# Patient Record
Sex: MALE | Race: WHITE | NOT HISPANIC OR LATINO | Employment: OTHER | URBAN - METROPOLITAN AREA
[De-identification: names, ages, dates, MRNs, and addresses within clinical notes are randomized per-mention and may not be internally consistent; named-entity substitution may affect disease eponyms.]

---

## 2017-08-15 RX ORDER — FINASTERIDE 5 MG/1
TABLET, FILM COATED ORAL
Qty: 90 TABLET | Refills: 0 | Status: SHIPPED | OUTPATIENT
Start: 2017-08-15 | End: 2017-10-19 | Stop reason: SDUPTHER

## 2017-08-15 RX ORDER — GLIPIZIDE 10 MG/1
TABLET ORAL
Qty: 180 TABLET | Refills: 0 | Status: SHIPPED | OUTPATIENT
Start: 2017-08-15 | End: 2017-10-19 | Stop reason: SDUPTHER

## 2017-08-15 RX ORDER — METFORMIN HYDROCHLORIDE 1000 MG/1
TABLET ORAL
Qty: 180 TABLET | Refills: 0 | Status: SHIPPED | OUTPATIENT
Start: 2017-08-15 | End: 2017-10-19 | Stop reason: SDUPTHER

## 2017-08-15 RX ORDER — LISINOPRIL 10 MG/1
TABLET ORAL
Qty: 90 TABLET | Refills: 0 | Status: SHIPPED | OUTPATIENT
Start: 2017-08-15 | End: 2017-10-19 | Stop reason: SDUPTHER

## 2017-10-19 ENCOUNTER — OFFICE VISIT (OUTPATIENT)
Dept: FAMILY MEDICINE | Facility: CLINIC | Age: 72
End: 2017-10-19

## 2017-10-19 VITALS
HEART RATE: 108 BPM | OXYGEN SATURATION: 98 % | HEIGHT: 69 IN | SYSTOLIC BLOOD PRESSURE: 110 MMHG | WEIGHT: 215 LBS | BODY MASS INDEX: 31.84 KG/M2 | DIASTOLIC BLOOD PRESSURE: 60 MMHG

## 2017-10-19 VITALS — WEIGHT: 217 LBS | HEIGHT: 69 IN | BODY MASS INDEX: 32.14 KG/M2

## 2017-10-19 DIAGNOSIS — B02.9 HERPES ZOSTER WITHOUT COMPLICATION: ICD-10-CM

## 2017-10-19 DIAGNOSIS — E11.9 TYPE 2 DIABETES MELLITUS WITHOUT COMPLICATION, WITHOUT LONG-TERM CURRENT USE OF INSULIN: Primary | ICD-10-CM

## 2017-10-19 DIAGNOSIS — I49.9 IRREGULAR HEARTBEAT: ICD-10-CM

## 2017-10-19 DIAGNOSIS — R35.1 EXCESSIVE URINATION AT NIGHT: ICD-10-CM

## 2017-10-19 PROBLEM — H54.40 BLIND LEFT EYE: Status: ACTIVE | Noted: 2017-10-19

## 2017-10-19 PROBLEM — Z78.9 NON-SMOKER: Status: ACTIVE | Noted: 2017-10-19

## 2017-10-19 PROBLEM — J30.1 HAY FEVER: Status: ACTIVE | Noted: 2017-10-19

## 2017-10-19 PROCEDURE — 99212 OFFICE O/P EST SF 10 MIN: CPT | Mod: ,,, | Performed by: NURSE PRACTITIONER

## 2017-10-19 RX ORDER — PLANT STANOL ESTER 450 MG
1 TABLET ORAL DAILY
COMMUNITY
End: 2017-10-19

## 2017-10-19 RX ORDER — PROMETHAZINE HYDROCHLORIDE 12.5 MG/1
1 TABLET ORAL EVERY 6 HOURS PRN
COMMUNITY
End: 2017-10-19

## 2017-10-19 RX ORDER — NIACIN 500 MG
1 CAPSULE, EXTENDED RELEASE ORAL DAILY
COMMUNITY
End: 2017-10-19

## 2017-10-19 RX ORDER — ASPIRIN 81 MG/1
1 TABLET ORAL 2 TIMES DAILY
COMMUNITY
End: 2019-07-02 | Stop reason: SDUPTHER

## 2017-10-19 RX ORDER — LISINOPRIL 10 MG/1
10 TABLET ORAL DAILY
Qty: 90 TABLET | Refills: 1 | Status: SHIPPED | OUTPATIENT
Start: 2017-10-19 | End: 2018-04-17 | Stop reason: SDUPTHER

## 2017-10-19 RX ORDER — GABAPENTIN 300 MG/1
300 CAPSULE ORAL 2 TIMES DAILY
COMMUNITY
End: 2018-06-28

## 2017-10-19 RX ORDER — GLIPIZIDE 10 MG/1
10 TABLET ORAL 2 TIMES DAILY
Qty: 180 TABLET | Refills: 1 | Status: SHIPPED | OUTPATIENT
Start: 2017-10-19 | End: 2018-04-17 | Stop reason: SDUPTHER

## 2017-10-19 RX ORDER — METFORMIN HYDROCHLORIDE 1000 MG/1
1000 TABLET ORAL 2 TIMES DAILY
Qty: 180 TABLET | Refills: 1 | Status: SHIPPED | OUTPATIENT
Start: 2017-10-19 | End: 2018-04-17 | Stop reason: SDUPTHER

## 2017-10-19 RX ORDER — FINASTERIDE 5 MG/1
5 TABLET, FILM COATED ORAL 2 TIMES DAILY
Qty: 180 TABLET | Refills: 1 | Status: SHIPPED | OUTPATIENT
Start: 2017-10-19 | End: 2018-04-17 | Stop reason: SDUPTHER

## 2017-10-19 RX ORDER — VALACYCLOVIR HYDROCHLORIDE 1 G/1
1000 TABLET, FILM COATED ORAL 3 TIMES DAILY
COMMUNITY
End: 2018-06-28

## 2017-10-19 RX ORDER — METOPROLOL TARTRATE 25 MG/1
25 TABLET, FILM COATED ORAL 2 TIMES DAILY
Qty: 180 TABLET | Refills: 1 | Status: SHIPPED | OUTPATIENT
Start: 2017-10-19 | End: 2018-04-17 | Stop reason: SDUPTHER

## 2017-10-19 NOTE — PATIENT INSTRUCTIONS
Understanding Atrial Fibrillation    An arrhythmia is any problem with the speed or pattern of the heartbeat. Atrial fibrillation (AFib) is a common type of arrhythmia. It causes fast, chaotic electrical signals in the atria. This leads to poor functioning of the heart. It also affects how much blood your heart can pump out to the body.  Afib may occur once in a while and go away on its own. Or it may continue for longer periods and need treatment.  AFib can lead to serious problems, such as stroke. Your healthcare provider will need to monitor and manage it.  What happens during atrial fibrillation?   The heart has an electrical system that sends signals to control the heartbeat. As signals move through the heart, they tell the hearts upper chambers (atria) and lower chambers (ventricles) when to squeeze (contract) and relax. This lets blood move through the heart and out to the body and lungs.  With AFib, the atria receive abnormal signals. This causes them to contract in a fast and irregular way, and out of sync with the ventricles. When this happens, the atria also have a harder time moving blood into the ventricles. Blood may then pool in the atria, which increases the risk for blood clots and stroke. The ventricles also may contract too quickly and irregularly. As a result, they may not pump blood to the body and lungs as well as they should. This can weaken the heart muscle over time and cause heart failure.  What causes atrial fibrillation?  AFib is more common in older adults. It has many possible causes including:  · Coronary artery disease  · Heart valve disease  · Heart attack  · Heart surgery  · High blood pressure  · Thyroid disease  · Diabetes  · Lung disease  · Sleep apnea  · Heavy alcohol use  In some cases of AFib, doctors do not know the cause.  What are the symptoms of atrial fibrillation?  AFib may or may not cause symptoms. If symptoms do occur, they may include:  · A fast, pounding,  irregular heartbeat  · Shortness of breath  · Tiredness  · Dizziness or fainting  · Chest pain  How is atrial fibrillation treated?  Treatments for AFib can include any of the options below.  · Medicines. You may be prescribed:  ¨ Heart rate medicines to help slow down the heartbeat  ¨ Heart rhythm medicines to help the heart beat more regularly  ¨ Anti-clotting medicines to help reduce the risk for blood clots and stroke  · Electrical cardioversion. Your healthcare provider uses special pads or paddles to send one or more brief electrical shocks to the heart. This can help reset the heartbeat to normal.  · Ablation. Long, thin tubes called catheters are threaded through a blood vessel to the heart. There, the catheters send out hot or cold energy to the areas causing the abnormal signals. This energy destroys the problem tissue or cells. This improves the chances that your heart will stay in normal rhythm without using medicines. If your heart rate and rhythm cant be controlled, you may need ablation and a pacemaker. These will help control the heart rate and regularity of the heartbeat.  · Surgery. During surgery, your healthcare provider may use different methods to create scar tissue in the areas of the heart causing the abnormal signals. The scar tissue disrupts the abnormal signals and may stop AFib from occurring.  What are the complications of atrial fibrillation?  These can include:  · Blood clots  · Stroke  · Heart failure. This problem occurs when the heart muscle weakens so much that it can no longer pump blood well.  When should I call my healthcare provider?  Call your healthcare provider right away if you have any of these:  · Symptoms that dont get better with treatment, or get worse  · New symptoms   Date Last Reviewed: 5/1/2016  © 2554-7350 [employer]. [address] [place] [address], [place] [address]. All rights reserved. This information is not intended as a substitute for professional  medical care. Always follow your healthcare professional's instructions.

## 2017-10-19 NOTE — PROGRESS NOTES
"Subjective:       Patient ID: Theo Chen Jr. is a 72 y.o. male.    Chief Complaint: Rash (forehead and scalp); Diabetes (rx refill); and Benign Prostatic Hypertrophy    Mr. Chen is here for medication refill.  He reports that he was having more problems with urination, but increased his "prostate medicine" to one at noon and one at bedtime and is doing much better.  He denies other new complaints.  He does have c/o continued fatigue.      Rash   This is a new (went to urgent care and was diagnosed with shingles) problem. The current episode started 1 to 4 weeks ago. The problem has been rapidly improving since onset. The affected locations include the scalp and face (forehead). The rash is characterized by pain, redness, swelling and blistering. Associated symptoms include fatigue. Pertinent negatives include no anorexia, congestion, cough, diarrhea, eye pain, facial edema, fever, joint pain, nail changes, rhinorrhea, shortness of breath, sore throat or vomiting. Treatments tried: on Valtrex and gabapentin. The treatment provided significant relief.   Diabetes   He presents for his follow-up diabetic visit. He has type 2 diabetes mellitus. His disease course has been stable. There are no hypoglycemic associated symptoms. Pertinent negatives for hypoglycemia include no confusion, dizziness, headaches or pallor. Associated symptoms include fatigue and weakness. Pertinent negatives for diabetes include no blurred vision, no chest pain, no foot paresthesias, no foot ulcerations, no polydipsia, no polyphagia, no polyuria, no visual change and no weight loss. There are no hypoglycemic complications. Symptoms are stable. Risk factors for coronary artery disease include diabetes mellitus, dyslipidemia, male sex, obesity and sedentary lifestyle. Current diabetic treatment includes oral agent (dual therapy). He is compliant with treatment most of the time. He is following a generally unhealthy diet. When asked about " meal planning, he reported none. He has not had a previous visit with a dietitian. He rarely participates in exercise. There is no change in his home blood glucose trend. An ACE inhibitor/angiotensin II receptor blocker is being taken. He does not see a podiatrist.Eye exam is not current.   Benign Prostatic Hypertrophy   This is a chronic problem. The current episode started more than 1 year ago. The problem has been gradually worsening since onset. Irritative symptoms include frequency and nocturia. Irritative symptoms do not include urgency. Obstructive symptoms include dribbling, incomplete emptying, an intermittent stream, a slower stream and a weak stream. Obstructive symptoms do not include straining. Associated symptoms include hesitancy. Pertinent negatives include no chills, dysuria, genital pain, hematuria, nausea or vomiting. He is not sexually active. Past treatments include finasteride. The treatment provided moderate relief. He has been using treatment for 2 or more years.       Review of Systems   Constitutional: Positive for fatigue. Negative for activity change, appetite change, chills, fever and weight loss.   HENT: Negative for congestion, rhinorrhea and sore throat.    Eyes: Negative for blurred vision, pain and visual disturbance.   Respiratory: Negative for cough and shortness of breath.    Cardiovascular: Negative for chest pain and palpitations.   Gastrointestinal: Negative for abdominal pain, anorexia, constipation, diarrhea, nausea and vomiting.   Endocrine: Negative for polydipsia, polyphagia and polyuria.   Genitourinary: Positive for frequency, hesitancy, incomplete emptying and nocturia. Negative for dysuria, hematuria and urgency.   Musculoskeletal: Negative for arthralgias, gait problem, joint pain and myalgias.   Skin: Positive for rash. Negative for color change, nail changes and pallor.   Allergic/Immunologic: Negative for immunocompromised state.   Neurological: Positive for  "weakness. Negative for dizziness, syncope, numbness and headaches.   Hematological: Negative for adenopathy.   Psychiatric/Behavioral: Negative for confusion, self-injury and suicidal ideas.        Past Surgical History:   Procedure Laterality Date    APPENDECTOMY      EYE SURGERY Left        Family History   Problem Relation Age of Onset    Hypertension Mother     Heart disease Father         Social History     Social History    Marital status:      Spouse name: N/A    Number of children: N/A    Years of education: N/A     Occupational History    retired       Social History Main Topics    Smoking status: Former Smoker    Smokeless tobacco: Never Used    Alcohol use No    Drug use: No    Sexual activity: Not Asked     Other Topics Concern    None     Social History Narrative    None       Current Outpatient Prescriptions   Medication Sig Dispense Refill    aspirin (ECOTRIN) 81 MG EC tablet Take 1 tablet by mouth once daily.      finasteride (PROSCAR) 5 mg tablet Take 1 tablet (5 mg total) by mouth 2 (two) times daily. 180 tablet 1    gabapentin (NEURONTIN) 300 MG capsule Take 300 mg by mouth 2 (two) times daily.      glipiZIDE (GLUCOTROL) 10 MG tablet Take 1 tablet (10 mg total) by mouth 2 (two) times daily. 180 tablet 1    lisinopril 10 MG tablet Take 1 tablet (10 mg total) by mouth once daily. 90 tablet 1    metformin (GLUCOPHAGE) 1000 MG tablet Take 1 tablet (1,000 mg total) by mouth 2 (two) times daily. 180 tablet 1    valacyclovir (VALTREX) 1000 MG tablet Take 1,000 mg by mouth 3 (three) times daily.       No current facility-administered medications for this visit.        Review of patient's allergies indicates:  No Known Allergies   Objective:   Blood pressure 110/60, pulse 108, height 5' 9" (1.753 m), weight 97.5 kg (215 lb), SpO2 98 %. Body mass index is 31.75 kg/m².       Physical Exam   Constitutional: He is oriented to person, place, and time. He appears well-developed and " well-nourished. No distress.   HENT:   Head: Normocephalic and atraumatic.   Right Ear: External ear normal.   Left Ear: External ear normal.   Nose: Nose normal.   Mouth/Throat: Oropharynx is clear and moist.   Eyes: Conjunctivae and lids are normal. Pupils are equal, round, and reactive to light. No scleral icterus.   Neck: Normal range of motion. Neck supple. Carotid bruit is not present. No thyromegaly present.   Cardiovascular: Normal rate and normal pulses.  An irregularly irregular rhythm present. Exam reveals no gallop and no friction rub.    No murmur heard.  Pulses:       Dorsalis pedis pulses are 2+ on the right side, and 2+ on the left side.        Posterior tibial pulses are 2+ on the right side, and 2+ on the left side.   Pulmonary/Chest: Effort normal and breath sounds normal.   Abdominal: Soft. Bowel sounds are normal. There is no tenderness.   Musculoskeletal: Normal range of motion.        Right foot: There is no deformity.        Left foot: There is no deformity.   Feet:   Right Foot:   Protective Sensation: 10 sites tested. 10 sites sensed.   Skin Integrity: Negative for ulcer, skin breakdown or warmth.   Left Foot:   Protective Sensation: 10 sites tested. 10 sites sensed.   Skin Integrity: Negative for ulcer, skin breakdown or warmth.   Lymphadenopathy:     He has no cervical adenopathy.   Neurological: He is alert and oriented to person, place, and time.   Skin: Skin is warm, dry and intact. Rash noted. Rash is vesicular (with crusting, healing). He is not diaphoretic.   Psychiatric: He has a normal mood and affect. His behavior is normal. Judgment and thought content normal. He expresses no suicidal plans.   Vitals reviewed.       Assessment:       1. Type 2 diabetes mellitus without complication, without long-term current use of insulin    2. Excessive urination at night    3. Irregular heartbeat    4. Herpes zoster without complication        Plan:       Theo was seen today for rash,  diabetes and benign prostatic hypertrophy.    Diagnoses and all orders for this visit:    Type 2 diabetes mellitus without complication, without long-term current use of insulin  -     lisinopril 10 MG tablet; Take 1 tablet (10 mg total) by mouth once daily.  -     glipiZIDE (GLUCOTROL) 10 MG tablet; Take 1 tablet (10 mg total) by mouth 2 (two) times daily.  -     metformin (GLUCOPHAGE) 1000 MG tablet; Take 1 tablet (1,000 mg total) by mouth 2 (two) times daily.  -     Comprehensive metabolic panel; Future  -     Hemoglobin A1c; Future    Excessive urination at night  -     finasteride (PROSCAR) 5 mg tablet; Take 1 tablet (5 mg total) by mouth 2 (two) times daily.    Irregular heartbeat   Likely a-fib; pt refused EKG in office; recommend he go to ER for eval, refused; increase aspirin to 325 mg daily;  Metoprolol 25 mg bid for rate control; side effects of untreated a-fib discussed with patient; counseled on sign/symptoms of a stroke; understanding voiced    Herpes zoster without complication   Improving; finish Valtrex

## 2018-04-17 DIAGNOSIS — R00.9 HEART RATE PROBLEM: Primary | ICD-10-CM

## 2018-04-17 DIAGNOSIS — E11.9 TYPE 2 DIABETES MELLITUS WITHOUT COMPLICATION, WITHOUT LONG-TERM CURRENT USE OF INSULIN: ICD-10-CM

## 2018-04-17 DIAGNOSIS — R35.1 EXCESSIVE URINATION AT NIGHT: ICD-10-CM

## 2018-04-17 RX ORDER — FINASTERIDE 5 MG/1
5 TABLET, FILM COATED ORAL 2 TIMES DAILY
Qty: 180 TABLET | Refills: 1 | Status: SHIPPED | OUTPATIENT
Start: 2018-04-17 | End: 2018-06-28 | Stop reason: SDUPTHER

## 2018-04-17 RX ORDER — LISINOPRIL 10 MG/1
10 TABLET ORAL DAILY
Qty: 90 TABLET | Refills: 1 | Status: SHIPPED | OUTPATIENT
Start: 2018-04-17 | End: 2018-06-28 | Stop reason: SDUPTHER

## 2018-04-17 RX ORDER — METOPROLOL TARTRATE 25 MG/1
25 TABLET, FILM COATED ORAL 2 TIMES DAILY
Qty: 180 TABLET | Refills: 1 | Status: SHIPPED | OUTPATIENT
Start: 2018-04-17 | End: 2018-06-28 | Stop reason: SDUPTHER

## 2018-04-17 RX ORDER — GLIPIZIDE 10 MG/1
10 TABLET ORAL 2 TIMES DAILY
Qty: 180 TABLET | Refills: 1 | Status: SHIPPED | OUTPATIENT
Start: 2018-04-17 | End: 2018-06-28 | Stop reason: SDUPTHER

## 2018-04-17 RX ORDER — METFORMIN HYDROCHLORIDE 1000 MG/1
1000 TABLET ORAL 2 TIMES DAILY
Qty: 180 TABLET | Refills: 1 | Status: SHIPPED | OUTPATIENT
Start: 2018-04-17 | End: 2018-06-28 | Stop reason: SDUPTHER

## 2018-06-28 ENCOUNTER — OFFICE VISIT (OUTPATIENT)
Dept: FAMILY MEDICINE | Facility: CLINIC | Age: 73
End: 2018-06-28
Payer: MEDICARE

## 2018-06-28 VITALS
SYSTOLIC BLOOD PRESSURE: 110 MMHG | DIASTOLIC BLOOD PRESSURE: 62 MMHG | HEIGHT: 69 IN | HEART RATE: 91 BPM | BODY MASS INDEX: 31.84 KG/M2 | WEIGHT: 215 LBS | OXYGEN SATURATION: 96 %

## 2018-06-28 DIAGNOSIS — R35.1 EXCESSIVE URINATION AT NIGHT: ICD-10-CM

## 2018-06-28 DIAGNOSIS — E11.9 TYPE 2 DIABETES MELLITUS WITHOUT COMPLICATION, WITHOUT LONG-TERM CURRENT USE OF INSULIN: ICD-10-CM

## 2018-06-28 DIAGNOSIS — L03.119 CELLULITIS OF LOWER EXTREMITY, UNSPECIFIED LATERALITY: Primary | ICD-10-CM

## 2018-06-28 DIAGNOSIS — R00.9 HEART RATE PROBLEM: ICD-10-CM

## 2018-06-28 PROCEDURE — 99212 OFFICE O/P EST SF 10 MIN: CPT | Mod: ,,, | Performed by: NURSE PRACTITIONER

## 2018-06-28 RX ORDER — LISINOPRIL 10 MG/1
10 TABLET ORAL DAILY
Qty: 90 TABLET | Refills: 1 | Status: SHIPPED | OUTPATIENT
Start: 2018-06-28 | End: 2019-02-14 | Stop reason: SDUPTHER

## 2018-06-28 RX ORDER — FINASTERIDE 5 MG/1
5 TABLET, FILM COATED ORAL 2 TIMES DAILY
Qty: 180 TABLET | Refills: 1 | Status: SHIPPED | OUTPATIENT
Start: 2018-06-28 | End: 2018-10-30 | Stop reason: ALTCHOICE

## 2018-06-28 RX ORDER — GLIPIZIDE 10 MG/1
10 TABLET ORAL 2 TIMES DAILY
Qty: 180 TABLET | Refills: 1 | Status: SHIPPED | OUTPATIENT
Start: 2018-06-28 | End: 2019-02-14 | Stop reason: SDUPTHER

## 2018-06-28 RX ORDER — METOPROLOL TARTRATE 25 MG/1
25 TABLET, FILM COATED ORAL 2 TIMES DAILY
Qty: 180 TABLET | Refills: 1 | Status: SHIPPED | OUTPATIENT
Start: 2018-06-28 | End: 2019-02-14 | Stop reason: SDUPTHER

## 2018-06-28 RX ORDER — METFORMIN HYDROCHLORIDE 1000 MG/1
1000 TABLET ORAL 2 TIMES DAILY
Qty: 180 TABLET | Refills: 1 | Status: SHIPPED | OUTPATIENT
Start: 2018-06-28 | End: 2019-02-14 | Stop reason: SDUPTHER

## 2018-06-28 RX ORDER — CEPHALEXIN 500 MG/1
500 CAPSULE ORAL EVERY 8 HOURS
Qty: 21 CAPSULE | Refills: 1 | Status: SHIPPED | OUTPATIENT
Start: 2018-06-28 | End: 2018-10-30

## 2018-06-28 NOTE — PATIENT INSTRUCTIONS
Aerobic Exercise for a Healthy Heart  Exercise is a lot more than an energy booster and a stress reliever. It also strengthens your heart muscle, lowers your blood pressure and cholesterol, and burns calories. It can also improve your resting muscle tone, and your mood.     Remember, some activity is better than none.    Choose an aerobic activity  Choose an activity that makes your heart and lungs work harder than they do when you rest or walk normally. This aerobic exercise can improve the way your heart and other muscles use oxygen. Make it fun by exercising with a friend and choosing an activity you enjoy. Here are some ideas:  · Walking  · Swimming  · Bicycling  · Stair climbing  · Dancing  · Jogging  · Gardening  Exercise regularly  If you havent been exercising regularly,  get your doctors OK first. Then start slowly.  Here are some tips:  · Begin exercising 3 times a week for 5 to 10 minutes at a time.  · When you feel comfortable, add a few minutes each session.  · Slowly build up to exercising 3 to 4 times each week. Each session should last for 40 minutes, on average, and involve moderate- to vigorous-intensity physical activity.  · If you have been given nitroglycerin, be sure to carry it when you exercise.  · If you get chest pain (angina) when youre exercising, stop what youre doing, take your nitroglycerin, and call your doctor.  Date Last Reviewed: 6/2/2016 © 2000-2017 [employer]. [address] [place] [address]. All rights reserved. This information is not intended as a substitute for professional medical care. Always follow your healthcare professional's instructions.        Healthy Meals for Diabetes     A healthcare provider will help you develop a meal plan that fits your needs.   Ask your healthcare team to help you make a meal plan that fits your needs. Your meal plan tells you when to eat your meals and snacks, what kinds of foods to eat, and how much of each  food to eat. You dont have to give up all the foods you like. But you do need to follow some guidelines.  Choose healthy carbohydrates  Starches, sugars, and fiber are all types of carbohydrates. Fiber can help lower your cholesterol and triglycerides. Fiber is also healthy for your heart. You should have 20 to 35 grams of total fiber each day. Fiber-rich foods include:  · Whole-grain breads and cereals  · Bulgur wheat  · Brown rice     · Whole-wheat pasta  · Fruits and vegetables  · Dry beans, and peas   Keep track of the amount of carbohydrates you eat. This can help you keep the right balance of physical activity and medicine. The amount of carbohydrates needed will vary for each person. It depends on many things such as your health, the medicines you take, and how active you are. Your healthcare team will help you figure out the right amount of carbohydrates for you. You may start with around 45 to 60 grams of carbohydrates per meal, depending on your situation.   Here are some examples of foods containing about 15 grams of carbohydrates (1 serving of carbohydrates):  · 1/2 cup of canned or frozen fruit  · A small piece of fresh fruit (4 ounces)  · 1 slice of bread  · 1/2 cup of oatmeal  · 1/3 cup of rice  · 4 to 6 crackers  · 1/2 English muffin  · 1/2 cup of black beans  · 1/4 of a large baked potato (3 ounces)  · 2/3 cup of plain fat-free yogurt  · 1 cup of soup  · 1/2 cup of casserole  · 6 chicken nuggets  · 2-inch-square brownie or cake without frosting  · 2 small cookies  · 1/2 cup of ice cream or sherbet  Choose healthy protein foods  Eating protein that is low in fat can help you control your weight. It also helps keep your heart healthy. Low-fat protein foods include:  · Fish  · Plant proteins, such as dry beans and peas, nuts, and soy products like tofu and soymilk  · Lean meat with all visible fat removed  · Poultry with the skin removed  · Low-fat or nonfat milk, cheese, and yogurt  Limit unhealthy  fats and sugar  Saturated and trans fats are unhealthy for your heart. They raise LDL (bad) cholesterol. Fat is also high in calories, so it can make you gain weight. To cut down on unhealthy fats and sugar, limit these foods:  · Butter or margarine  · Palm and palm kernel oils and coconut oil  · Cream  · Cheese  · Angelo  · Lunch meats     · Ice cream  · Sweet bakery goods such as pies, muffins, and donuts  · Jams and jellies  · Candy bars  · Regular sodas   How much to eat  The amount of food you eat affects your blood sugar. It also affects your weight. Your healthcare team will tell you how much of each type of food you should eat.  · Use measuring cups and spoons and a food scale to measure serving sizes.  · Learn what a correct serving size looks like on your plate. This will help when you are away from home and cant measure your servings.  · Eat only the number of servings given on your meal plan for each food. Dont take seconds.  · Learn to read food labels. Be sure to look at serving size, total carbohydrates, fiber, calories, sugar, and saturated and trans fats. Look for healthier alternatives to foods that have added sugar.  · Plan ahead for parties so you can still have a good time without going overboard with unhealthy food choices. Set a good example yourself by bringing a healthy dish to pot lucks.   Choose healthy snacks  When it comes to snacks, we usually think about foods with added sugar and fats. But there are many other options for healthier snack choices. Here are a few snack ideas to choose from:  Snacks with less than 5 grams of carbohydrates  · 1 piece of string cheese  · 3 celery sticks plus 1 tablespoon of peanut butter  · 5 cherry tomatoes plus 1 tablespoon of ranch dressing  · 1 hard-boiled egg  · 1/4 cup of fresh blueberries  ·  5 baby carrots  · 1 cup of light popcorn  · 1/2 cup of sugar-free gelatin  · 15 almonds  Snacks with about 10 to 20 grams of carbohydrates  · 1/3 cup of  hummus plus 1 cup of fresh cut nonstarchy vegetables (carrots, green peppers, broccoli, celery, or a combination)  · 1/2 cup of fresh or canned fruit plus 1/4 cup of cottage cheese  · 1/2 cup of tuna salad with 4 crackers  · 2 rice cakes and a tablespoon of peanut butter  · 1 small apple or orange  · 3 cups light popcorn  · 1/2 of a turkey sandwich (1 slice of whole-wheat bread, 2 ounces of turkey, and mustard)  Portion sizes are important to controlling your blood sugar and staying at a healthy weight. Stock up on healthy snack items so you always have them on hand.  When to eat  Your meal plan will likely include breakfast, lunch, dinner, and some snacks.  · Try to eat your meals and snacks at about the same times each day.  · Eat all your meals and snacks. Skipping a meal or snack can make your blood sugar drop too low. It can also cause you to eat too much at the next meal or snack. Then your blood sugar could get too high.  Date Last Reviewed: 7/1/2016  © 9756-8110 [employer]. [address] [place] [address], [place] [address]. All rights reserved. This information is not intended as a substitute for professional medical care. Always follow your healthcare professional's instructions.

## 2018-06-28 NOTE — PROGRESS NOTES
SUBJECTIVE:      Patient ID: Theo Chen Jr. is a 73 y.o. male.    Chief Complaint: Rash (both feet x 1 month) and Diabetes (labs and refills)    Mr. Chen is here today with c/o rash to the top of both of his feet.  He states it started out as a small [place] on the top of his right foot but has been worsening.  He has tried medication for athlete's foot.  He states he has tried peroxide and alcohol as well with no improvement.    He also needs refills for his diabetes medication.  He continues to eat ice cream nightly.      Rash   This is a new problem. The current episode started more than 1 month ago. The affected locations include the left ankle, right foot and right ankle. The rash is characterized by itchiness, redness and pain. He was exposed to nothing. Pertinent negatives include no anorexia, congestion, cough, diarrhea, eye pain, facial edema, fatigue, fever, joint pain, nail changes, rhinorrhea, shortness of breath, sore throat or vomiting. Past treatments include cold compress (anti fungal). The treatment provided no relief.   Diabetes   He presents for his follow-up diabetic visit. He has type 2 diabetes mellitus. No MedicAlert identification noted. His disease course has been stable. Pertinent negatives for hypoglycemia include no confusion, dizziness, headaches, nervousness/anxiousness or pallor. Pertinent negatives for diabetes include no blurred vision, no chest pain, no fatigue, no foot paresthesias, no foot ulcerations, no polydipsia, no polyphagia, no polyuria, no visual change, no weakness and no weight loss. There are no hypoglycemic complications. Symptoms are stable. Risk factors for coronary artery disease include male sex, obesity and sedentary lifestyle. Current diabetic treatment includes oral agent (dual therapy) and diet. He is compliant with treatment most of the time (takes meds, but does not follow diabetic diet). His weight is stable. He is following a generally unhealthy  diet. When asked about meal planning, he reported none. He has not had a previous visit with a dietitian. He participates in exercise intermittently. His home blood glucose trend is fluctuating minimally. An ACE inhibitor/angiotensin II receptor blocker is being taken. He does not see a podiatrist.Eye exam is not current.       Past Surgical History:   Procedure Laterality Date    APPENDECTOMY      EYE SURGERY Left      Family History   Problem Relation Age of Onset    Hypertension Mother     Heart disease Father       Social History     Social History    Marital status:      Spouse name: N/A    Number of children: N/A    Years of education: N/A     Occupational History    retired       Social History Main Topics    Smoking status: Former Smoker    Smokeless tobacco: Never Used    Alcohol use No    Drug use: No    Sexual activity: Not Asked     Other Topics Concern    None     Social History Narrative    None     Current Outpatient Prescriptions   Medication Sig Dispense Refill    aspirin (ECOTRIN) 81 MG EC tablet Take 1 tablet by mouth once daily.      finasteride (PROSCAR) 5 mg tablet Take 1 tablet (5 mg total) by mouth 2 (two) times daily. 180 tablet 1    glipiZIDE (GLUCOTROL) 10 MG tablet Take 1 tablet (10 mg total) by mouth 2 (two) times daily. 180 tablet 1    lisinopril 10 MG tablet Take 1 tablet (10 mg total) by mouth once daily. 90 tablet 1    metFORMIN (GLUCOPHAGE) 1000 MG tablet Take 1 tablet (1,000 mg total) by mouth 2 (two) times daily. 180 tablet 1    metoprolol tartrate (LOPRESSOR) 25 MG tablet Take 1 tablet (25 mg total) by mouth 2 (two) times daily. To keep heart rate down 180 tablet 1    cephALEXin (KEFLEX) 500 MG capsule Take 1 capsule (500 mg total) by mouth every 8 (eight) hours. 21 capsule 1     No current facility-administered medications for this visit.      Review of patient's allergies indicates:  No Known Allergies   Past Medical History:   Diagnosis Date     "Diabetes mellitus, type 2     Gastroenteritis     Seasonal allergic rhinitis due to pollen     Shingles      Past Surgical History:   Procedure Laterality Date    APPENDECTOMY      EYE SURGERY Left        Review of Systems   Constitutional: Negative for activity change, appetite change, chills, fatigue, fever, unexpected weight change and weight loss.   HENT: Negative for congestion, nosebleeds, rhinorrhea, sore throat and voice change.    Eyes: Negative for blurred vision, pain, discharge and visual disturbance.   Respiratory: Negative for cough and shortness of breath.    Cardiovascular: Negative for chest pain, palpitations and leg swelling.   Gastrointestinal: Negative for abdominal pain, anorexia, constipation, diarrhea and vomiting.   Endocrine: Negative for polydipsia, polyphagia and polyuria.   Genitourinary: Negative for difficulty urinating, dysuria, frequency and urgency.   Musculoskeletal: Negative for arthralgias, gait problem, joint pain, myalgias and neck stiffness.   Skin: Positive for rash. Negative for color change, nail changes and pallor.   Allergic/Immunologic: Negative for immunocompromised state.   Neurological: Negative for dizziness, syncope, weakness, numbness and headaches.   Hematological: Negative for adenopathy.   Psychiatric/Behavioral: Negative for confusion, dysphoric mood, self-injury, sleep disturbance and suicidal ideas. The patient is not nervous/anxious.       OBJECTIVE:      Vitals:    06/28/18 1308   BP: 110/62   Pulse: 91   SpO2: 96%   Weight: 97.5 kg (215 lb)   Height: 5' 9" (1.753 m)     Physical Exam   Constitutional: He is oriented to person, place, and time. He appears well-developed and well-nourished. No distress.   HENT:   Head: Normocephalic and atraumatic.   Right Ear: External ear normal.   Left Ear: External ear normal.   Nose: Nose normal.   Mouth/Throat: Oropharynx is clear and moist. No oropharyngeal exudate.   Eyes: Conjunctivae and lids are normal. " Pupils are equal, round, and reactive to light. Right eye exhibits no discharge. Left eye exhibits no discharge. No scleral icterus.   Neck: Normal range of motion. Neck supple. Carotid bruit is not present. No thyromegaly present.   Cardiovascular: Normal rate, regular rhythm, normal heart sounds and intact distal pulses.  Exam reveals no gallop and no friction rub.    No murmur heard.  Pulses:       Dorsalis pedis pulses are 2+ on the right side, and 2+ on the left side.        Posterior tibial pulses are 2+ on the right side, and 2+ on the left side.   Pulmonary/Chest: Effort normal and breath sounds normal. No respiratory distress. He has no wheezes. He has no rales.   Abdominal: Soft. Bowel sounds are normal. There is no tenderness.   Musculoskeletal: Normal range of motion. He exhibits no edema or tenderness.        Right foot: There is no deformity.        Left foot: There is no deformity.   Feet:   Right Foot:   Protective Sensation: 10 sites tested. 10 sites sensed.   Skin Integrity: Negative for ulcer, skin breakdown or warmth.   Left Foot:   Protective Sensation: 10 sites tested. 10 sites sensed.   Skin Integrity: Negative for ulcer, skin breakdown or warmth.   Lymphadenopathy:     He has no cervical adenopathy.   Neurological: He is alert and oriented to person, place, and time.   Skin: Skin is warm, dry and intact. Rash noted. Macular rash: to top of right foot up to ankle; small area to left anterior ankle as well. He is not diaphoretic. There is erythema.   Psychiatric: He has a normal mood and affect. His behavior is normal. Judgment and thought content normal. He expresses no suicidal plans.      Assessment:       1. Cellulitis of lower extremity, unspecified laterality    2. Type 2 diabetes mellitus without complication, without long-term current use of insulin    3. Excessive urination at night    4. Heart rate problem        Plan:       Cellulitis of lower extremity, unspecified laterality  -      cephALEXin (KEFLEX) 500 MG capsule; Take 1 capsule (500 mg total) by mouth every 8 (eight) hours.  Dispense: 21 capsule; Refill: 1   Watch diet; wounds will not heal until blood sugar is under control; understanding voiced    Type 2 diabetes mellitus without complication, without long-term current use of insulin   DIET!  -     glipiZIDE (GLUCOTROL) 10 MG tablet; Take 1 tablet (10 mg total) by mouth 2 (two) times daily.  Dispense: 180 tablet; Refill: 1  -     lisinopril 10 MG tablet; Take 1 tablet (10 mg total) by mouth once daily.  Dispense: 90 tablet; Refill: 1  -     metFORMIN (GLUCOPHAGE) 1000 MG tablet; Take 1 tablet (1,000 mg total) by mouth 2 (two) times daily.  Dispense: 180 tablet; Refill: 1    Excessive urination at night  -     finasteride (PROSCAR) 5 mg tablet; Take 1 tablet (5 mg total) by mouth 2 (two) times daily.  Dispense: 180 tablet; Refill: 1    Heart rate problem  -     metoprolol tartrate (LOPRESSOR) 25 MG tablet; Take 1 tablet (25 mg total) by mouth 2 (two) times daily. To keep heart rate down  Dispense: 180 tablet; Refill: 1        Follow-up in about 6 months (around 12/28/2018), or if symptoms worsen or fail to improve, for labs/med refill.      6/28/2018 RONNIE Kennedy, FNP

## 2018-07-17 ENCOUNTER — TELEPHONE (OUTPATIENT)
Dept: FAMILY MEDICINE | Facility: CLINIC | Age: 73
End: 2018-07-17

## 2018-07-17 DIAGNOSIS — L03.119 CELLULITIS OF LOWER EXTREMITY, UNSPECIFIED LATERALITY: ICD-10-CM

## 2018-07-17 RX ORDER — CEPHALEXIN 500 MG/1
500 CAPSULE ORAL EVERY 8 HOURS
Qty: 21 CAPSULE | Refills: 1 | Status: CANCELLED | OUTPATIENT
Start: 2018-07-17

## 2018-10-30 ENCOUNTER — OFFICE VISIT (OUTPATIENT)
Dept: FAMILY MEDICINE | Facility: CLINIC | Age: 73
End: 2018-10-30
Payer: MEDICARE

## 2018-10-30 VITALS
TEMPERATURE: 98 F | SYSTOLIC BLOOD PRESSURE: 128 MMHG | RESPIRATION RATE: 16 BRPM | OXYGEN SATURATION: 97 % | BODY MASS INDEX: 31.75 KG/M2 | DIASTOLIC BLOOD PRESSURE: 74 MMHG | HEART RATE: 80 BPM | HEIGHT: 69 IN

## 2018-10-30 DIAGNOSIS — R07.89 ATYPICAL CHEST PAIN: ICD-10-CM

## 2018-10-30 DIAGNOSIS — E11.9 TYPE 2 DIABETES MELLITUS WITHOUT COMPLICATION, WITHOUT LONG-TERM CURRENT USE OF INSULIN: ICD-10-CM

## 2018-10-30 DIAGNOSIS — R05.9 COUGH: ICD-10-CM

## 2018-10-30 DIAGNOSIS — B37.2 YEAST DERMATITIS: Primary | ICD-10-CM

## 2018-10-30 DIAGNOSIS — R35.1 EXCESSIVE URINATION AT NIGHT: ICD-10-CM

## 2018-10-30 PROCEDURE — 99214 OFFICE O/P EST MOD 30 MIN: CPT | Mod: ,,, | Performed by: NURSE PRACTITIONER

## 2018-10-30 RX ORDER — NYSTATIN 100000 U/G
CREAM TOPICAL 2 TIMES DAILY
Qty: 60 G | Refills: 1 | Status: SHIPPED | OUTPATIENT
Start: 2018-10-30 | End: 2018-12-26 | Stop reason: SDUPTHER

## 2018-10-30 RX ORDER — FLUCONAZOLE 150 MG/1
150 TABLET ORAL
Qty: 2 TABLET | Refills: 0 | Status: SHIPPED | OUTPATIENT
Start: 2018-10-30 | End: 2018-10-31

## 2018-10-30 RX ORDER — TAMSULOSIN HYDROCHLORIDE 0.4 MG/1
0.8 CAPSULE ORAL DAILY
Qty: 60 CAPSULE | Refills: 1 | Status: SHIPPED | OUTPATIENT
Start: 2018-10-30 | End: 2018-11-14 | Stop reason: SDUPTHER

## 2018-10-30 NOTE — PATIENT INSTRUCTIONS
Aerobic Exercise for a Healthy Heart  Exercise is a lot more than an energy booster and a stress reliever. It also strengthens your heart muscle, lowers your blood pressure and cholesterol, and burns calories. It can also improve your resting muscle tone, and your mood.     Remember, some activity is better than none.    Choose an aerobic activity  Choose an activity that makes your heart and lungs work harder than they do when you rest or walk normally. This aerobic exercise can improve the way your heart and other muscles use oxygen. Make it fun by exercising with a friend and choosing an activity you enjoy. Here are some ideas:  · Walking  · Swimming  · Bicycling  · Stair climbing  · Dancing  · Jogging  · Gardening  Exercise regularly  If you havent been exercising regularly,  get your doctors OK first. Then start slowly.  Here are some tips:  · Begin exercising 3 times a week for 5 to 10 minutes at a time.  · When you feel comfortable, add a few minutes each session.  · Slowly build up to exercising 3 to 4 times each week. Each session should last for 40 minutes, on average, and involve moderate- to vigorous-intensity physical activity.  · If you have been given nitroglycerin, be sure to carry it when you exercise.  · If you get chest pain (angina) when youre exercising, stop what youre doing, take your nitroglycerin, and call your doctor.  Date Last Reviewed: 6/2/2016 © 2000-2017 [employer]. [address] [place] [address]. All rights reserved. This information is not intended as a substitute for professional medical care. Always follow your healthcare professional's instructions.        Aerobic Exercise for a Healthy Heart  Exercise is a lot more than an energy booster and a stress reliever. It also strengthens your heart muscle, lowers your blood pressure and cholesterol, and burns calories. It can also improve your resting muscle tone, and your mood.     Remember, some  activity is better than none.    Choose an aerobic activity  Choose an activity that makes your heart and lungs work harder than they do when you rest or walk normally. This aerobic exercise can improve the way your heart and other muscles use oxygen. Make it fun by exercising with a friend and choosing an activity you enjoy. Here are some ideas:  · Walking  · Swimming  · Bicycling  · Stair climbing  · Dancing  · Jogging  · Gardening  Exercise regularly  If you havent been exercising regularly,  get your doctors OK first. Then start slowly.  Here are some tips:  · Begin exercising 3 times a week for 5 to 10 minutes at a time.  · When you feel comfortable, add a few minutes each session.  · Slowly build up to exercising 3 to 4 times each week. Each session should last for 40 minutes, on average, and involve moderate- to vigorous-intensity physical activity.  · If you have been given nitroglycerin, be sure to carry it when you exercise.  · If you get chest pain (angina) when youre exercising, stop what youre doing, take your nitroglycerin, and call your doctor.  Date Last Reviewed: 6/2/2016  © 4799-0813 [employer]. [address] [place] [address]. All rights reserved. This information is not intended as a substitute for professional medical care. Always follow your healthcare professional's instructions.        Long-Term Complications of Diabetes    Diabetes can cause health problems over time. These are called complications. They are more likely to happen if your blood sugar is often too high. Over time, high blood sugar can damage blood vessels in your body. It is important to keep your blood sugar in your target range. This can help prevent or delay complications from diabetes.  Possible complications  Complications of diabetes include:  · Eye problems, including damage to the blood vessels in the eyes (retinopathy), pressure in the eye (glaucoma), and clouding of the eyes lens (a  cataract). Eye problems can eventually lead to irreversible blindness.   · Tooth and gum problems (periodontal disease), causing loss of teeth and bone  · Blood vessel (vascular) disease leading to circulation problems, heart attack or stroke, or a need for amputation of a limb   · Problems with sexual function leading to erectile dysfunction in men and sexual discomfort in women   · Kidney disease (nephropathy) can eventually lead to kidney failure, which may require dialysis or kidney transplant   · Nerve problems (neuropathy), causing pain or loss of feeling in your feet and other parts of your body, potentially leading to an amputation of a limb   · High blood pressure (hypertension), putting strain on your heart and blood vessels  · Serious infections, possibly leading to loss of toes, feet, or limbs  How to avoid complications  The serious consequences of these complications may be avoidable for most people with diabetes by managing your blood glucose, blood pressure, and cholesterol levels. This can help you feel better and stay healthy. You can manage diabetes by tracking your blood sugar. You can also eat healthy and exercise to avoid gaining weight. And you should take medicine if directed by your healthcare provider.  Date Last Reviewed: 5/1/2016  © 2554-1612 [employer]. [address] [place] [address]. All rights reserved. This information is not intended as a substitute for professional medical care. Always follow your healthcare professional's instructions.        Your Diabetes Foot Care Program    Every day you depend on your feet to keep you moving. But when you have diabetes, your feet need special care. Even a small foot problem can become very serious. So dont take your feet for granted. By working with your diabetes healthcare team, you can learn how to protect your feet and keep them healthy.  Evaluating your feet  An evaluation helps your healthcare provider check the  condition of your feet. The evaluation includes a review of your diabetes history and overall health. It may also include a foot exam, X-rays, or other tests. These can help show problems beneath the skin that you cant see or feel.  Medical history  You will be asked about your overall health and any history of foot problems. Youll also discuss your diabetes history, such as whether your blood sugar level has changed over time. It also includes questions about sensations of pain, tingling, pins and needles, or numbness. Your healthcare provider will also want to know if you have high blood pressure and heart disease, or if you smoke. Be sure to mention any medicines (including over-the-counter), supplements, or herbal remedies you take.  Foot exam  A foot exam checks the condition of different parts of your foot. First, your skin and nails are examined for any signs of infection. Blood flow is checked by feeling for the pulses in each foot. You may also have tests to study the nerves in the foot. These include using a small filament (wire) to see how sensitive your feet are. In certain cases, you will be asked to walk a short distance to check for bone, joint, and muscle problems.  Diagnostic tests  If needed, your healthcare provider will suggest certain tests to learn more about your feet. These include:  · Doppler tests to measure blood flow in the feet and lower leg.  · X-rays, which can show bone or joint problems.  · Other imaging tests, such as an MRI (magnetic resonance imaging), bone scan, and CT (computed tomography) scan. These can help show bone infections.  · Other tests, such as vascular tests, which study the blood flow in your feet and legs. You may also have nerve studies to learn how sensitive your feet are.  Creating a foot care program  Based on the evaluation, your healthcare provider will create a foot care program for you. Your program may be as simple as starting a daily self-care routine  and changing the types of shoes your wear. It may also involve treating minor foot problems, such as a corn or blister. In some cases, surgery will be needed to treat an infection or mechanical problems, such as hammer toes.  Preventing problems  When you have diabetes, its easier to prevent problems than to treat them later on. So see your healthcare team for regular checkups and foot care. Your healthcare team can also help you learn more about caring for your feet at home. For example, you may be told to avoid walking barefoot. Or you may be told that special footwear is needed to protect your feet.  Have regular checkups  Foot problems can develop quickly. So be sure to follow your healthcare teams schedule for regular checkups. During office visits, take off your shoes and socks as soon as you get in the exam room. Ask your healthcare provider to examine your feet for problems. This will make it easier to find and treat small skin irritations before they get worse. Regular checkups can also help keep track of the blood flow and feeling in your feet. If you have neuropathy (lack of feeling in your feet), you will need to have checkups more often.  Learn about self-care  The more you know about diabetes and your feet, the easier it will be to prevent problems. Members of your healthcare team can teach you how to inspect your feet and teach you to look for warning signs. They can also give you other foot care tips. During office visits, be sure to ask any questions you have.  Date Last Reviewed: 7/1/2016  © 0724-5847 [employer]. [address] [place] [address], [place] [address]. All rights reserved. This information is not intended as a substitute for professional medical care. Always follow your healthcare professional's instructions.

## 2018-10-30 NOTE — PROGRESS NOTES
SUBJECTIVE:      Patient ID: Theo Chen Jr. is a 73 y.o. male.    Chief Complaint: Diabetes and Rash (Rt foot)    Mr. Chen is here for follow up for diabetes, hypertension and nocturia.  He also has c/o a rash to the top of his right foot.      Diabetes   He presents for his follow-up diabetic visit. He has type 2 diabetes mellitus. Pertinent negatives for hypoglycemia include no confusion, dizziness, headaches, nervousness/anxiousness, pallor or sweats. Associated symptoms include chest pain (pressure at night when supine; none with exertion), fatigue and polyuria. Pertinent negatives for diabetes include no blurred vision, no foot paresthesias, no foot ulcerations, no polydipsia, no polyphagia, no visual change, no weakness and no weight loss. There are no hypoglycemic complications. Symptoms are stable. There are no diabetic complications. Risk factors for coronary artery disease include male sex, obesity and sedentary lifestyle. Current diabetic treatment includes oral agent (dual therapy) and diet. He is compliant with treatment some of the time. His weight is stable. He is following a generally unhealthy diet. He has not had a previous visit with a dietitian. He rarely participates in exercise. An ACE inhibitor/angiotensin II receptor blocker is being taken. He does not see a podiatrist.Eye exam is not current.   Hypertension   This is a chronic problem. The current episode started more than 1 year ago. The problem is controlled. Associated symptoms include chest pain (pressure at night when supine; none with exertion). Pertinent negatives include no anxiety, blurred vision, headaches, malaise/fatigue, neck pain, orthopnea, palpitations, peripheral edema, PND, shortness of breath or sweats. Risk factors for coronary artery disease include diabetes mellitus, male gender and obesity. Past treatments include ACE inhibitors. The current treatment provides moderate improvement. Compliance problems  include diet and exercise.    Rash   This is a recurrent problem. The current episode started 1 to 4 weeks ago. The problem has been waxing and waning since onset. The affected locations include the right foot. The rash is characterized by dryness, itchiness and redness. He was exposed to nothing. Associated symptoms include coughing (intermittent, dry) and fatigue. Pertinent negatives include no anorexia, congestion, diarrhea, eye pain, facial edema, fever, joint pain, nail changes, rhinorrhea, shortness of breath, sore throat or vomiting. Treatments tried: antifungals. The treatment provided significant relief.       Past Surgical History:   Procedure Laterality Date    APPENDECTOMY      EYE SURGERY Left      Family History   Problem Relation Age of Onset    Hypertension Mother     Heart disease Father       Social History     Socioeconomic History    Marital status:      Spouse name: None    Number of children: None    Years of education: None    Highest education level: None   Social Needs    Financial resource strain: None    Food insecurity - worry: None    Food insecurity - inability: None    Transportation needs - medical: None    Transportation needs - non-medical: None   Occupational History    Occupation: retired    Tobacco Use    Smoking status: Former Smoker    Smokeless tobacco: Never Used   Substance and Sexual Activity    Alcohol use: No    Drug use: No    Sexual activity: None   Other Topics Concern    None   Social History Narrative    None     Current Outpatient Medications   Medication Sig Dispense Refill    aspirin (ECOTRIN) 81 MG EC tablet Take 1 tablet by mouth 2 (two) times daily.       glipiZIDE (GLUCOTROL) 10 MG tablet Take 1 tablet (10 mg total) by mouth 2 (two) times daily. 180 tablet 1    lisinopril 10 MG tablet Take 1 tablet (10 mg total) by mouth once daily. 90 tablet 1    metFORMIN (GLUCOPHAGE) 1000 MG tablet Take 1 tablet (1,000 mg total) by mouth 2  (two) times daily. 180 tablet 1    metoprolol tartrate (LOPRESSOR) 25 MG tablet Take 1 tablet (25 mg total) by mouth 2 (two) times daily. To keep heart rate down 180 tablet 1    fluconazole (DIFLUCAN) 150 MG Tab Take 1 tablet (150 mg total) by mouth every 7 days. for 1 day 2 tablet 0    nystatin (MYCOSTATIN) cream Apply topically 2 (two) times daily. 60 g 1    ranitidine (ZANTAC) 300 MG tablet Take 1 tablet (300 mg total) by mouth every evening. 30 tablet 11    tamsulosin (FLOMAX) 0.4 mg Cap Take 2 capsules (0.8 mg total) by mouth once daily. 60 capsule 1     No current facility-administered medications for this visit.      Review of patient's allergies indicates:  No Known Allergies   Past Medical History:   Diagnosis Date    Diabetes mellitus, type 2     Gastroenteritis     Seasonal allergic rhinitis due to pollen     Shingles      Past Surgical History:   Procedure Laterality Date    APPENDECTOMY      EYE SURGERY Left        Review of Systems   Constitutional: Positive for fatigue. Negative for activity change, appetite change, chills, fever, malaise/fatigue, unexpected weight change and weight loss.   HENT: Negative for congestion, nosebleeds, rhinorrhea, sore throat and voice change.    Eyes: Negative for blurred vision, pain, discharge and visual disturbance.   Respiratory: Positive for cough (intermittent, dry). Negative for apnea, shortness of breath and wheezing.    Cardiovascular: Positive for chest pain (pressure at night when supine; none with exertion). Negative for palpitations, orthopnea and PND.   Gastrointestinal: Negative for abdominal pain, anorexia, constipation, diarrhea and vomiting.   Endocrine: Positive for polyuria. Negative for polydipsia and polyphagia.   Genitourinary: Positive for difficulty urinating (nocturia) and frequency. Negative for dysuria and urgency.   Musculoskeletal: Negative for arthralgias, gait problem, joint pain, myalgias and neck pain.   Skin: Positive for  "rash. Negative for color change, nail changes and pallor.   Allergic/Immunologic: Negative for immunocompromised state.   Neurological: Negative for dizziness, syncope, weakness, numbness and headaches.   Hematological: Negative for adenopathy. Does not bruise/bleed easily.   Psychiatric/Behavioral: Negative for agitation, confusion, dysphoric mood, self-injury, sleep disturbance and suicidal ideas. The patient is not nervous/anxious.       OBJECTIVE:      Vitals:    10/30/18 1450   BP: 128/74   BP Location: Left arm   Patient Position: Sitting   BP Method: Medium (Manual)   Pulse: 80   Resp: 16   Temp: 97.5 °F (36.4 °C)   SpO2: 97%   Height: 5' 9" (1.753 m)     Physical Exam   Constitutional: He is oriented to person, place, and time. He appears well-developed and well-nourished. No distress.   HENT:   Head: Normocephalic and atraumatic.   Right Ear: External ear normal.   Left Ear: External ear normal.   Nose: Nose normal.   Mouth/Throat: Oropharynx is clear and moist. No oropharyngeal exudate.   Eyes: Conjunctivae, EOM and lids are normal. Pupils are equal, round, and reactive to light. Right eye exhibits no discharge. Left eye exhibits no discharge. No scleral icterus.   Neck: Normal range of motion. Neck supple. Carotid bruit is not present. No tracheal deviation present. No thyromegaly present.   Cardiovascular: Normal rate, regular rhythm, normal heart sounds and intact distal pulses. Exam reveals no gallop and no friction rub.   No murmur heard.  Pulses:       Dorsalis pedis pulses are 2+ on the right side, and 2+ on the left side.        Posterior tibial pulses are 2+ on the right side, and 2+ on the left side.   Pulmonary/Chest: Effort normal and breath sounds normal. No stridor. No respiratory distress. He has no wheezes. He has no rales.   Abdominal: Soft. Bowel sounds are normal. He exhibits no distension and no mass. There is no tenderness. There is no rebound and no guarding.   Musculoskeletal: Normal " range of motion. He exhibits no edema or tenderness.        Right foot: There is no deformity.        Left foot: There is no deformity.   Feet:   Right Foot:   Protective Sensation: 10 sites tested. 10 sites sensed.   Skin Integrity: Negative for ulcer, skin breakdown or warmth.   Left Foot:   Protective Sensation: 10 sites tested. 10 sites sensed.   Skin Integrity: Negative for ulcer, skin breakdown or warmth.   Lymphadenopathy:     He has no cervical adenopathy.   Neurological: He is alert and oriented to person, place, and time.   Skin: Skin is warm, dry and intact. Rash noted. Rash is maculopapular (top of right foot). He is not diaphoretic.   Psychiatric: He has a normal mood and affect. His behavior is normal. Judgment and thought content normal. He expresses no suicidal plans.   Vitals reviewed.     Assessment:       1. Yeast dermatitis    2. Excessive urination at night    3. Type 2 diabetes mellitus without complication, without long-term current use of insulin    4. Cough    5. Atypical chest pain        Plan:       Yeast dermatitis  -     fluconazole (DIFLUCAN) 150 MG Tab; Take 1 tablet (150 mg total) by mouth every 7 days. for 1 day  Dispense: 2 tablet; Refill: 0  -     nystatin (MYCOSTATIN) cream; Apply topically 2 (two) times daily.  Dispense: 60 g; Refill: 1    Excessive urination at night  -     tamsulosin (FLOMAX) 0.4 mg Cap; Take 2 capsules (0.8 mg total) by mouth once daily.  Dispense: 60 capsule; Refill: 1    Type 2 diabetes mellitus without complication, without long-term current use of insulin  -     Comprehensive metabolic panel; Future; Expected date: 10/30/2018  -     Hemoglobin A1c; Future; Expected date: 10/30/2018  -     Microalbumin/creatinine urine ratio; Future; Expected date: 10/30/2018  -     Lipid panel; Future; Expected date: 10/30/2018  -     CBC auto differential; Future; Expected date: 10/30/2018  -     Urinalysis; Future  -     Foot Exam Performed    Cough  -     X-Ray Chest PA  And Lateral; Future; Expected date: 10/30/2018    Atypical chest pain  -     ranitidine (ZANTAC) 300 MG tablet; Take 1 tablet (300 mg total) by mouth every evening.  Dispense: 30 tablet; Refill: 11        Follow-up in about 3 months (around 1/30/2019) for recheck labs-diabetes.      10/30/2018 RONNIE Kennedy, KATIEP

## 2018-11-01 ENCOUNTER — TELEPHONE (OUTPATIENT)
Dept: FAMILY MEDICINE | Facility: CLINIC | Age: 73
End: 2018-11-01

## 2018-11-01 LAB
ALBUMIN SERPL-MCNC: 4.9 G/DL (ref 3.5–4.8)
ALBUMIN/CREAT UR: 30.5 MG/G CREAT (ref 0–30)
ALBUMIN/GLOB SERPL: 1.7 {RATIO} (ref 1.2–2.2)
ALP SERPL-CCNC: 75 IU/L (ref 39–117)
ALT SERPL-CCNC: 54 IU/L (ref 0–44)
APPEARANCE UR: CLEAR
AST SERPL-CCNC: 42 IU/L (ref 0–40)
BASOPHILS # BLD AUTO: 0 X10E3/UL (ref 0–0.2)
BASOPHILS NFR BLD AUTO: 1 %
BILIRUB SERPL-MCNC: 0.5 MG/DL (ref 0–1.2)
BILIRUB UR QL STRIP: NEGATIVE
BUN SERPL-MCNC: 14 MG/DL (ref 8–27)
BUN/CREAT SERPL: 11 (ref 10–24)
CALCIUM SERPL-MCNC: 9.9 MG/DL (ref 8.6–10.2)
CHLORIDE SERPL-SCNC: 98 MMOL/L (ref 96–106)
CHOLEST SERPL-MCNC: 223 MG/DL (ref 100–199)
CO2 SERPL-SCNC: 21 MMOL/L (ref 20–29)
COLOR UR: YELLOW
CREAT SERPL-MCNC: 1.22 MG/DL (ref 0.76–1.27)
CREAT UR-MCNC: 105.4 MG/DL
EGFR IF AFRICAN AMERICAN: 67 ML/MIN/1.73
EOSINOPHIL # BLD AUTO: 0.4 X10E3/UL (ref 0–0.4)
EOSINOPHIL NFR BLD AUTO: 6 %
ERYTHROCYTE [DISTWIDTH] IN BLOOD BY AUTOMATED COUNT: 15.8 % (ref 12.3–15.4)
EST. GFR  (NON AFRICAN AMERICAN): 58 ML/MIN/1.73
GLOBULIN SER CALC-MCNC: 2.9 G/DL (ref 1.5–4.5)
GLUCOSE SERPL-MCNC: 214 MG/DL (ref 65–99)
GLUCOSE UR QL: ABNORMAL
HBA1C MFR BLD: 8.6 % (ref 4.8–5.6)
HCT VFR BLD AUTO: 46.2 % (ref 37.5–51)
HDLC SERPL-MCNC: 49 MG/DL
HGB BLD-MCNC: 14.9 G/DL (ref 13–17.7)
HGB UR QL STRIP: NEGATIVE
IMM GRANULOCYTES # BLD: 0 X10E3/UL (ref 0–0.1)
IMM GRANULOCYTES NFR BLD: 0 %
KETONES UR QL STRIP: ABNORMAL
LDLC SERPL CALC-MCNC: 130 MG/DL (ref 0–99)
LEUKOCYTE ESTERASE UR QL STRIP: NEGATIVE
LYMPHOCYTES # BLD AUTO: 1.4 X10E3/UL (ref 0.7–3.1)
LYMPHOCYTES NFR BLD AUTO: 19 %
MCH RBC QN AUTO: 27.4 PG (ref 26.6–33)
MCHC RBC AUTO-ENTMCNC: 32.3 G/DL (ref 31.5–35.7)
MCV RBC AUTO: 85 FL (ref 79–97)
MICRO URNS: ABNORMAL
MICROALBUMIN UR-MCNC: 32.1 UG/ML
MONOCYTES # BLD AUTO: 0.5 X10E3/UL (ref 0.1–0.9)
MONOCYTES NFR BLD AUTO: 7 %
NEUTROPHILS # BLD AUTO: 4.9 X10E3/UL (ref 1.4–7)
NEUTROPHILS NFR BLD AUTO: 67 %
NITRITE UR QL STRIP: NEGATIVE
PH UR STRIP: 5.5 [PH] (ref 5–7.5)
PLATELET # BLD AUTO: 261 X10E3/UL (ref 150–379)
POTASSIUM SERPL-SCNC: 5 MMOL/L (ref 3.5–5.2)
PROT SERPL-MCNC: 7.8 G/DL (ref 6–8.5)
PROT UR QL STRIP: NEGATIVE
RBC # BLD AUTO: 5.43 X10E6/UL (ref 4.14–5.8)
SODIUM SERPL-SCNC: 138 MMOL/L (ref 134–144)
SP GR UR: 1.02 (ref 1–1.03)
TRIGL SERPL-MCNC: 222 MG/DL (ref 0–149)
UROBILINOGEN UR STRIP-MCNC: 0.2 MG/DL (ref 0.2–1)
VLDLC SERPL CALC-MCNC: 44 MG/DL (ref 5–40)
WBC # BLD AUTO: 7.3 X10E3/UL (ref 3.4–10.8)

## 2018-11-01 NOTE — TELEPHONE ENCOUNTER
----- Message from VETO Paz sent at 11/1/2018  4:34 PM CDT -----  Multiple minor abnormalities on labs.  Please have him schedule sooner ov to discuss all.  We need to add at least 2 more medications.  Thanks.

## 2018-11-06 ENCOUNTER — TELEPHONE (OUTPATIENT)
Dept: FAMILY MEDICINE | Facility: CLINIC | Age: 73
End: 2018-11-06

## 2018-11-14 ENCOUNTER — OFFICE VISIT (OUTPATIENT)
Dept: FAMILY MEDICINE | Facility: CLINIC | Age: 73
End: 2018-11-14
Payer: MEDICARE

## 2018-11-14 VITALS
HEART RATE: 86 BPM | DIASTOLIC BLOOD PRESSURE: 64 MMHG | SYSTOLIC BLOOD PRESSURE: 120 MMHG | HEIGHT: 69 IN | WEIGHT: 213 LBS | OXYGEN SATURATION: 97 % | BODY MASS INDEX: 31.55 KG/M2 | TEMPERATURE: 98 F

## 2018-11-14 DIAGNOSIS — B35.4 TINEA CORPORIS: ICD-10-CM

## 2018-11-14 DIAGNOSIS — E11.22 TYPE 2 DIABETES MELLITUS WITH STAGE 3 CHRONIC KIDNEY DISEASE, WITHOUT LONG-TERM CURRENT USE OF INSULIN: Primary | ICD-10-CM

## 2018-11-14 DIAGNOSIS — E78.5 DYSLIPIDEMIA: ICD-10-CM

## 2018-11-14 DIAGNOSIS — N18.30 TYPE 2 DIABETES MELLITUS WITH STAGE 3 CHRONIC KIDNEY DISEASE, WITHOUT LONG-TERM CURRENT USE OF INSULIN: Primary | ICD-10-CM

## 2018-11-14 DIAGNOSIS — M54.50 ACUTE MIDLINE LOW BACK PAIN WITHOUT SCIATICA: ICD-10-CM

## 2018-11-14 DIAGNOSIS — R35.1 EXCESSIVE URINATION AT NIGHT: ICD-10-CM

## 2018-11-14 PROCEDURE — 99213 OFFICE O/P EST LOW 20 MIN: CPT | Mod: ,,, | Performed by: NURSE PRACTITIONER

## 2018-11-14 RX ORDER — FLUCONAZOLE 150 MG/1
150 TABLET ORAL WEEKLY
Qty: 2 TABLET | Refills: 1 | Status: SHIPPED | OUTPATIENT
Start: 2018-11-14 | End: 2018-11-15

## 2018-11-14 RX ORDER — TAMSULOSIN HYDROCHLORIDE 0.4 MG/1
0.8 CAPSULE ORAL DAILY
Qty: 180 CAPSULE | Refills: 1 | Status: SHIPPED | OUTPATIENT
Start: 2018-11-14 | End: 2019-02-14 | Stop reason: SDUPTHER

## 2018-11-14 RX ORDER — IBUPROFEN 200 MG
400 TABLET ORAL EVERY 8 HOURS PRN
Qty: 30 TABLET | Refills: 0 | COMMUNITY
Start: 2018-11-14 | End: 2019-05-15

## 2018-11-14 RX ORDER — PRAVASTATIN SODIUM 20 MG/1
20 TABLET ORAL NIGHTLY
Qty: 90 TABLET | Refills: 1 | Status: SHIPPED | OUTPATIENT
Start: 2018-11-14 | End: 2019-02-14 | Stop reason: SDUPTHER

## 2018-11-14 NOTE — PROGRESS NOTES
"    SUBJECTIVE:      Patient ID: Theo Chen Jr. is a 73 y.o. male.    Chief Complaint: lab results and Back Pain    Mr. Venegas is here today to go over labs results for diabetes.  He also has c/o lower back pain that started a couple of days ago.  He states it happens every once in a while and "it will usually work itself out."      Diabetes   He presents for his follow-up diabetic visit. He has type 2 diabetes mellitus. No MedicAlert identification noted. His disease course has been stable. Hypoglycemia symptoms include hunger and sweats. Pertinent negatives for hypoglycemia include no confusion, dizziness, headaches or pallor. Pertinent negatives for diabetes include no blurred vision, no chest pain, no fatigue, no foot paresthesias, no foot ulcerations, no polydipsia, no polyphagia, no polyuria, no visual change, no weakness and no weight loss. There are no hypoglycemic complications. Symptoms are stable. Diabetic complications include nephropathy. Risk factors for coronary artery disease include dyslipidemia, diabetes mellitus, male sex, hypertension and sedentary lifestyle. Current diabetic treatment includes oral agent (dual therapy) and diet. He is compliant with treatment most of the time. His weight is stable. He is following a generally unhealthy diet. When asked about meal planning, he reported none. He has not had a previous visit with a dietitian. He rarely participates in exercise. An ACE inhibitor/angiotensin II receptor blocker is being taken. He does not see a podiatrist.Eye exam is not current.   Back Pain   This is a recurrent problem. The current episode started in the past 7 days. The problem occurs daily. The problem has been gradually improving since onset. The pain is present in the lumbar spine. The quality of the pain is described as aching. The pain does not radiate. The pain is at a severity of 5/10. The pain is moderate. The symptoms are aggravated by bending and twisting. Pertinent " negatives include no abdominal pain, bladder incontinence, bowel incontinence, chest pain, dysuria, fever, headaches, leg pain, numbness, paresis, paresthesias, pelvic pain, perianal numbness, tingling, weakness or weight loss. He has tried nothing for the symptoms.       Past Surgical History:   Procedure Laterality Date    APPENDECTOMY      EYE SURGERY Left      Family History   Problem Relation Age of Onset    Hypertension Mother     Heart disease Father       Social History     Socioeconomic History    Marital status:      Spouse name: None    Number of children: None    Years of education: None    Highest education level: None   Social Needs    Financial resource strain: None    Food insecurity - worry: None    Food insecurity - inability: None    Transportation needs - medical: None    Transportation needs - non-medical: None   Occupational History    Occupation: retired    Tobacco Use    Smoking status: Former Smoker    Smokeless tobacco: Never Used   Substance and Sexual Activity    Alcohol use: No    Drug use: No    Sexual activity: None   Other Topics Concern    None   Social History Narrative    None     Current Outpatient Medications   Medication Sig Dispense Refill    aspirin (ECOTRIN) 81 MG EC tablet Take 1 tablet by mouth 2 (two) times daily.       glipiZIDE (GLUCOTROL) 10 MG tablet Take 1 tablet (10 mg total) by mouth 2 (two) times daily. 180 tablet 1    lisinopril 10 MG tablet Take 1 tablet (10 mg total) by mouth once daily. 90 tablet 1    metFORMIN (GLUCOPHAGE) 1000 MG tablet Take 1 tablet (1,000 mg total) by mouth 2 (two) times daily. 180 tablet 1    metoprolol tartrate (LOPRESSOR) 25 MG tablet Take 1 tablet (25 mg total) by mouth 2 (two) times daily. To keep heart rate down 180 tablet 1    nystatin (MYCOSTATIN) cream Apply topically 2 (two) times daily. 60 g 1    ranitidine (ZANTAC) 300 MG tablet Take 1 tablet (300 mg total) by mouth every evening. 30 tablet 11     tamsulosin (FLOMAX) 0.4 mg Cap Take 2 capsules (0.8 mg total) by mouth once daily. 180 capsule 1    fluconazole (DIFLUCAN) 150 MG Tab Take 1 tablet (150 mg total) by mouth once a week. for 1 day 2 tablet 1    ibuprofen (MOTRIN IB) 200 MG tablet Take 2 tablets (400 mg total) by mouth every 8 (eight) hours as needed for Pain. 30 tablet 0    pravastatin (PRAVACHOL) 20 MG tablet Take 1 tablet (20 mg total) by mouth nightly. 90 tablet 1    SITagliptin (JANUVIA) 100 MG Tab Take 1 tablet (100 mg total) by mouth once daily. 90 tablet 1     No current facility-administered medications for this visit.      Review of patient's allergies indicates:  No Known Allergies   Past Medical History:   Diagnosis Date    Diabetes mellitus, type 2     Gastroenteritis     Seasonal allergic rhinitis due to pollen     Shingles      Past Surgical History:   Procedure Laterality Date    APPENDECTOMY      EYE SURGERY Left        Review of Systems   Constitutional: Negative for activity change, appetite change, chills, fatigue, fever, unexpected weight change and weight loss.   HENT: Negative for congestion, nosebleeds, rhinorrhea, sore throat and trouble swallowing.    Eyes: Negative for blurred vision, pain, discharge and visual disturbance.   Respiratory: Negative for apnea, cough, shortness of breath and wheezing.    Cardiovascular: Negative for chest pain, palpitations and leg swelling.   Gastrointestinal: Negative for abdominal pain, bowel incontinence, constipation and diarrhea.   Endocrine: Negative for polydipsia, polyphagia and polyuria.   Genitourinary: Negative for bladder incontinence, dysuria, frequency, pelvic pain and urgency.   Musculoskeletal: Positive for back pain. Negative for arthralgias, gait problem and myalgias.   Skin: Positive for rash. Negative for color change and pallor.   Allergic/Immunologic: Negative for immunocompromised state.   Neurological: Negative for dizziness, tingling, syncope, weakness,  "numbness, headaches and paresthesias.   Hematological: Negative for adenopathy.   Psychiatric/Behavioral: Negative for confusion, dysphoric mood, self-injury and suicidal ideas.      OBJECTIVE:      Vitals:    11/14/18 0750 11/14/18 0848   BP: 120/64    Pulse: 86    Temp: 97.7 °F (36.5 °C)    SpO2: (!) 94% 97%  Comment: recheck prior to leaving office   Weight: 96.6 kg (213 lb)    Height: 5' 9" (1.753 m)      Physical Exam   Constitutional: He is oriented to person, place, and time. He appears well-developed and well-nourished. No distress.   HENT:   Head: Normocephalic and atraumatic.   Right Ear: External ear normal.   Left Ear: External ear normal.   Nose: Nose normal.   Mouth/Throat: Oropharynx is clear and moist.   Eyes: Conjunctivae and lids are normal. Right eye exhibits no discharge. Left eye exhibits no discharge. No scleral icterus.   Neck: Normal range of motion. Carotid bruit is not present.   Cardiovascular: Normal rate, regular rhythm, normal heart sounds and intact distal pulses. Exam reveals no gallop and no friction rub.   Pulmonary/Chest: Effort normal and breath sounds normal. No stridor. No respiratory distress. He has no wheezes.   Musculoskeletal: He exhibits tenderness. He exhibits no edema.        Lumbar back: He exhibits decreased range of motion and tenderness. He exhibits no swelling, no edema, no deformity and no laceration.   Lymphadenopathy:     He has no cervical adenopathy.   Neurological: He is alert and oriented to person, place, and time.   Skin: Skin is warm, dry and intact. Rash noted. Rash is maculopapular (top of feet; improved, but not completely resolved). He is not diaphoretic. No erythema. No pallor.   Psychiatric: He has a normal mood and affect. He expresses no suicidal plans.   Vitals reviewed.     Assessment:       1. Type 2 diabetes mellitus with stage 3 chronic kidney disease, without long-term current use of insulin    2. Dyslipidemia    3. Excessive urination at " night    4. Tinea corporis    5. Acute midline low back pain without sciatica        Plan:       Type 2 diabetes mellitus with stage 3 chronic kidney disease, without long-term current use of insulin   Labs reviewed with patient; Side effects of new medication discussed with patient; understanding voiced.  -     SITagliptin (JANUVIA) 100 MG Tab; Take 1 tablet (100 mg total) by mouth once daily.  Dispense: 90 tablet; Refill: 1  -     Comprehensive metabolic panel; Future; Expected date: 02/01/2019  -     Hemoglobin A1c; Future; Expected date: 02/01/2019  -     Lipid panel; Future; Expected date: 02/01/2019  -     Microalbumin/creatinine urine ratio; Future; Expected date: 02/01/2019    Dyslipidemia   Side effects of new medication discussed with patient; understanding voiced.  -     pravastatin (PRAVACHOL) 20 MG tablet; Take 1 tablet (20 mg total) by mouth nightly.  Dispense: 90 tablet; Refill: 1    Excessive urination at night   Stable; continue current medications.  -     tamsulosin (FLOMAX) 0.4 mg Cap; Take 2 capsules (0.8 mg total) by mouth once daily.  Dispense: 180 capsule; Refill: 1    Tinea corporis   Improving; repeat diflucan; control blood sugar  -     fluconazole (DIFLUCAN) 150 MG Tab; Take 1 tablet (150 mg total) by mouth once a week. for 1 day  Dispense: 2 tablet; Refill: 1    Acute midline low back pain without sciatica   Heat to area, home stretches and OTC Ibuprofen.  Understanding voiced; follow up if does not improve.  -     ibuprofen (MOTRIN IB) 200 MG tablet; Take 2 tablets (400 mg total) by mouth every 8 (eight) hours as needed for Pain.  Dispense: 30 tablet; Refill: 0        Follow-up in about 3 months (around 2/14/2019), or if symptoms worsen or fail to improve, for diabetes.      11/14/2018 Juanita Delgado, APRN, FNP

## 2018-11-14 NOTE — PATIENT INSTRUCTIONS
Exercises to Strengthen Your Lower Back  Strong lower back and abdominal muscles work together to support your spine. The exercises below will help strengthen the lower back. It is important that you begin exercising slowly and increase levels gradually.  Always begin any exercise program with stretching. If you feel pain while doing any of these exercises, stop and talk to your doctor about a more specific exercise program that better suits your condition.   Low back stretch  The point of stretching is to make you more flexible and increase your range of motion. Stretch only as much as you are able. Stretch slowly. Do not push your stretch to the limit. If at any point you feel pain while stretching, this is your (temporary) limit.  · Lie on your back with your knees bent and both feet on the ground.  · Slowly raise your left knee to your chest as you flatten your lower back against the floor. Hold for 5 seconds.  · Relax and repeat the exercise with your right knee.  · Do 10 of these exercises for each leg.  · Repeat hugging both knees to your chest at the same time.  Building lower back strength  Start your exercise routine with 10 to 30 minutes a day, 1 to 3 times a day.  Initial exercises  Lying on your back:  1. Ankle pumps: Move your foot up and down, towards your head, and then away. Repeat 10 times with each foot.  2. Heel slides: Slowly bend your knee, drawing the heel of your foot towards you. Then slide your heel/foot from you, straightening your knee. Do not lift your foot off the floor (this is not a leg lift).  3. Abdominal contraction: Bend your knees and put your hands on your stomach. Tighten your stomach muscles. Hold for 5 seconds, then relax. Repeat 10 times.  4. Straight leg raise: Bend one leg at the knee and keep the other leg straight. Tighten your stomach muscles. Slowly lift your straight leg 6 to 12 inches off the floor and hold for up to 5 seconds. Repeat 10 times on each  side.  Standin. Wall squats: Stand with your back against the wall. Move your feet about 12 inches away from the wall. Tighten your stomach muscles, and slowly bend your knees until they are at about a 45 degree angle. Do not go down too far. Hold about 5 seconds. Then slowly return to your starting position. Repeat 10 times.  2. Heel raises: Stand facing the wall. Slowly raise the heels of your feet up and down, while keeping your toes on the floor. If you have trouble balancing, you can touch the wall with your hands. Repeat 10 times.  More advanced exercises  When you feel comfortable enough, try these exercises.  1. Kneeling lumbar extension: Begin on your hands and knees. At the same time, raise and straighten your right arm and left leg until they are parallel to the ground. Hold for 2 seconds and come back slowly to a starting position. Repeat with left arm and right leg, alternating 10 times.  2. Prone lumbar extension: Lie face down, arms extended overhead, palms on the floor. At the same time, raise your right arm and left leg as high as comfortably possible. Hold for 10 seconds and slowly return to start. Repeat with left arm and right leg, alternating 10 times. Gradually build up to 20 times. (Advanced: Repeat this exercise raising both arms and both legs a few inches off the floor at the same time. Hold for 5 seconds and release.)  3. Pelvic tilt: Lie on the floor on your back with your knees bent at 90 degrees. Your feet should be flat on the floor. Inhale, exhale, then slowly contract your abdominal muscles bringing your navel toward your spine. Let your pelvis rock back until your lower back is flat on the floor. Hold for 10 seconds while breathing smoothly.  4. Abdominal crunch: Perform a pelvic tilt (above) flattening your lower back against the floor. Holding the tension in your abdominal muscles, take another breath and raise your shoulder blades off the ground (this is not a full sit-up).  Keep your head in line with your body (dont bend your neck forward). Hold for 2 seconds, then slowly lower.  Date Last Reviewed: 6/1/2016  © 5405-3369 [employer]. [address] [place] [address], [place] [address]. All rights reserved. This information is not intended as a substitute for professional medical care. Always follow your healthcare professional's instructions.

## 2018-12-26 DIAGNOSIS — B37.2 YEAST DERMATITIS: ICD-10-CM

## 2018-12-26 RX ORDER — NYSTATIN 100000 U/G
CREAM TOPICAL
Qty: 60 G | Refills: 0 | Status: SHIPPED | OUTPATIENT
Start: 2018-12-26 | End: 2019-10-23 | Stop reason: SDUPTHER

## 2019-02-06 LAB
ALBUMIN SERPL-MCNC: 4.7 G/DL (ref 3.5–4.8)
ALBUMIN/CREAT UR: 26.6 MG/G CREAT (ref 0–30)
ALBUMIN/GLOB SERPL: 1.9 {RATIO} (ref 1.2–2.2)
ALP SERPL-CCNC: 70 IU/L (ref 39–117)
ALT SERPL-CCNC: 40 IU/L (ref 0–44)
AST SERPL-CCNC: 31 IU/L (ref 0–40)
BILIRUB SERPL-MCNC: 0.5 MG/DL (ref 0–1.2)
BUN SERPL-MCNC: 21 MG/DL (ref 8–27)
BUN/CREAT SERPL: 16 (ref 10–24)
CALCIUM SERPL-MCNC: 9.4 MG/DL (ref 8.6–10.2)
CHLORIDE SERPL-SCNC: 96 MMOL/L (ref 96–106)
CHOLEST SERPL-MCNC: 157 MG/DL (ref 100–199)
CO2 SERPL-SCNC: 25 MMOL/L (ref 20–29)
CREAT SERPL-MCNC: 1.3 MG/DL (ref 0.76–1.27)
CREAT UR-MCNC: 85.3 MG/DL
GLOBULIN SER CALC-MCNC: 2.5 G/DL (ref 1.5–4.5)
GLUCOSE SERPL-MCNC: 235 MG/DL (ref 65–99)
HBA1C MFR BLD: 8.1 % (ref 4.8–5.6)
HDLC SERPL-MCNC: 45 MG/DL
LDLC SERPL CALC-MCNC: 78 MG/DL (ref 0–99)
MICROALBUMIN UR-MCNC: 22.7 UG/ML
POTASSIUM SERPL-SCNC: 5.2 MMOL/L (ref 3.5–5.2)
PROT SERPL-MCNC: 7.2 G/DL (ref 6–8.5)
SODIUM SERPL-SCNC: 134 MMOL/L (ref 134–144)
TRIGL SERPL-MCNC: 168 MG/DL (ref 0–149)
VLDLC SERPL CALC-MCNC: 34 MG/DL (ref 5–40)

## 2019-02-14 ENCOUNTER — OFFICE VISIT (OUTPATIENT)
Dept: FAMILY MEDICINE | Facility: CLINIC | Age: 74
End: 2019-02-14
Payer: MEDICARE

## 2019-02-14 VITALS
HEART RATE: 100 BPM | SYSTOLIC BLOOD PRESSURE: 122 MMHG | OXYGEN SATURATION: 96 % | BODY MASS INDEX: 31.43 KG/M2 | DIASTOLIC BLOOD PRESSURE: 80 MMHG | HEIGHT: 69 IN | WEIGHT: 212.19 LBS

## 2019-02-14 DIAGNOSIS — E11.22 TYPE 2 DIABETES MELLITUS WITH STAGE 3 CHRONIC KIDNEY DISEASE, WITHOUT LONG-TERM CURRENT USE OF INSULIN: Primary | ICD-10-CM

## 2019-02-14 DIAGNOSIS — E78.5 DYSLIPIDEMIA: ICD-10-CM

## 2019-02-14 DIAGNOSIS — I10 ESSENTIAL HYPERTENSION: ICD-10-CM

## 2019-02-14 DIAGNOSIS — Z12.5 SCREENING FOR PROSTATE CANCER: ICD-10-CM

## 2019-02-14 DIAGNOSIS — R35.1 EXCESSIVE URINATION AT NIGHT: ICD-10-CM

## 2019-02-14 DIAGNOSIS — Z12.11 SCREENING FOR COLON CANCER: ICD-10-CM

## 2019-02-14 DIAGNOSIS — N18.30 TYPE 2 DIABETES MELLITUS WITH STAGE 3 CHRONIC KIDNEY DISEASE, WITHOUT LONG-TERM CURRENT USE OF INSULIN: Primary | ICD-10-CM

## 2019-02-14 PROCEDURE — 99213 OFFICE O/P EST LOW 20 MIN: CPT | Mod: ,,, | Performed by: NURSE PRACTITIONER

## 2019-02-14 PROCEDURE — 99213 PR OFFICE/OUTPT VISIT, EST, LEVL III, 20-29 MIN: ICD-10-PCS | Mod: ,,, | Performed by: NURSE PRACTITIONER

## 2019-02-14 RX ORDER — PRAVASTATIN SODIUM 20 MG/1
20 TABLET ORAL NIGHTLY
Qty: 90 TABLET | Refills: 1 | Status: SHIPPED | OUTPATIENT
Start: 2019-02-14 | End: 2019-07-02 | Stop reason: SDUPTHER

## 2019-02-14 RX ORDER — LISINOPRIL 10 MG/1
10 TABLET ORAL DAILY
Qty: 90 TABLET | Refills: 1 | Status: SHIPPED | OUTPATIENT
Start: 2019-02-14 | End: 2019-07-02 | Stop reason: SDUPTHER

## 2019-02-14 RX ORDER — DOXAZOSIN 2 MG/1
2 TABLET ORAL NIGHTLY
Qty: 30 TABLET | Refills: 2 | Status: SHIPPED | OUTPATIENT
Start: 2019-02-14 | End: 2019-05-15 | Stop reason: SDUPTHER

## 2019-02-14 RX ORDER — METFORMIN HYDROCHLORIDE 1000 MG/1
1000 TABLET ORAL 2 TIMES DAILY
Qty: 180 TABLET | Refills: 1 | Status: SHIPPED | OUTPATIENT
Start: 2019-02-14 | End: 2019-07-02 | Stop reason: SDUPTHER

## 2019-02-14 RX ORDER — METOPROLOL TARTRATE 25 MG/1
25 TABLET, FILM COATED ORAL 2 TIMES DAILY
Qty: 180 TABLET | Refills: 1 | Status: SHIPPED | OUTPATIENT
Start: 2019-02-14 | End: 2019-07-02 | Stop reason: SDUPTHER

## 2019-02-14 RX ORDER — TAMSULOSIN HYDROCHLORIDE 0.4 MG/1
0.8 CAPSULE ORAL DAILY
Qty: 180 CAPSULE | Refills: 1 | Status: SHIPPED | OUTPATIENT
Start: 2019-02-14 | End: 2019-07-02 | Stop reason: SDUPTHER

## 2019-02-14 RX ORDER — GLIPIZIDE 10 MG/1
10 TABLET ORAL 2 TIMES DAILY
Qty: 180 TABLET | Refills: 1 | Status: SHIPPED | OUTPATIENT
Start: 2019-02-14 | End: 2019-07-02 | Stop reason: SDUPTHER

## 2019-02-14 NOTE — PATIENT INSTRUCTIONS
BPH (Enlarged Prostate)  The prostate is a gland at the base of the bladder. As some men get older, the prostate may get bigger in size. This problem is called benign prostatic hyperplasia (BPH). BPH puts pressure on the urethra. This is the tube that carries urine from the bladder to the penis. It may interfere with the flow of urine. It may also keep the bladder from emptying fully.    Symptoms of BPH include trouble starting urination and feeling as though the bladder isnt emptying all the way. It also includes a weak urine stream, dribbling and leaking of urine, and frequent and urgent urination (especially at night). BPH can increase the risk of urinary infections. It can also block off urine flow completely. If this occurs, a thin tube (catheter) may be passed into the bladder to help drain urine.  If symptoms are mild, no treatment may be needed right now. If symptoms are more severe, treatment is likely needed. The goal of treatment is to improve urine flow and reduce symptoms. Treatments can include medicine and procedures. Your healthcare provider will discuss treatment options with you as needed.  Home care  The following guidelines will help you care for yourself at home:  · Urinate as soon as you feel the urge. Don't try to hold your urine.  · Don't limit your fluid intake during the day. Drink 6 to 8 glasses of water or liquids a day. This prevents bacteria from building up in the bladder.  · Avoid drinking fluids after dinner to help reduce urination during the night.  · Avoid medicines that can worsen your symptoms. These include certain cold and allergy medicines and antidepressants. Diuretics used for high blood pressure can also worsen symptoms. Talk to your doctor about the medicines you take. Other choices may work better for you.  Prostate cancer screening  BPH does not increase the risk of prostate cancer. But because prostate cancer is a common cancer in men, screening is sometimes  recommended. This may help detect the cancer in its early stages when treatment is most effective. Factors that can increase the risk of prostate cancer include being -American or having a father or brother who had prostate cancer. A high-fat diet may also increase the risk of prostate cancer. Talk to your healthcare provider to see whether you should be screened for prostate cancer.  Follow-up care  Follow up with your healthcare provider, or as advised  To learn more, go to:  · National Kidney & Urologic Diseases Information Clearinghouse  kidney.niddk.nih.gov, 861.966.5045  When to seek medical advice  Call your healthcare provider right away if any of these occur:  · Fever of 100.4°F (38.0°C) or higher, or as advised  · Unable to pass urine for 8 hours  · Increasing pressure or pain in your bladder (lower abdomen)  · Blood in the urine  · Increasing low back pain, not related to injury  · Symptoms of urinary infection (increased urge to urinate, burning when passing urine, foul-smelling urine)  Date Last Reviewed: 7/1/2016 © 2000-2017 [employer]. [address] [place] [address]. All rights reserved. This information is not intended as a substitute for professional medical care. Always follow your healthcare professional's instructions.        Aerobic Exercise for a Healthy Heart  Exercise is a lot more than an energy booster and a stress reliever. It also strengthens your heart muscle, lowers your blood pressure and cholesterol, and burns calories. It can also improve your resting muscle tone, and your mood.     Remember, some activity is better than none.    Choose an aerobic activity  Choose an activity that makes your heart and lungs work harder than they do when you rest or walk normally. This aerobic exercise can improve the way your heart and other muscles use oxygen. Make it fun by exercising with a friend and choosing an activity you enjoy. Here are some  ideas:  · Walking  · Swimming  · Bicycling  · Stair climbing  · Dancing  · Jogging  · Gardening  Exercise regularly  If you havent been exercising regularly,  get your doctors OK first. Then start slowly.  Here are some tips:  · Begin exercising 3 times a week for 5 to 10 minutes at a time.  · When you feel comfortable, add a few minutes each session.  · Slowly build up to exercising 3 to 4 times each week. Each session should last for 40 minutes, on average, and involve moderate- to vigorous-intensity physical activity.  · If you have been given nitroglycerin, be sure to carry it when you exercise.  · If you get chest pain (angina) when youre exercising, stop what youre doing, take your nitroglycerin, and call your doctor.  Date Last Reviewed: 6/2/2016 © 2000-2017 [employer]. [address] [place] [address]. All rights reserved. This information is not intended as a substitute for professional medical care. Always follow your healthcare professional's instructions.        Healthy Meals for Diabetes     A healthcare provider will help you develop a meal plan that fits your needs.   Ask your healthcare team to help you make a meal plan that fits your needs. Your meal plan tells you when to eat your meals and snacks, what kinds of foods to eat, and how much of each food to eat. You dont have to give up all the foods you like. But you do need to follow some guidelines.  Choose healthy carbohydrates  Starches, sugars, and fiber are all types of carbohydrates. Fiber can help lower your cholesterol and triglycerides. Fiber is also healthy for your heart. You should have 20 to 35 grams of total fiber each day. Fiber-rich foods include:  · Whole-grain breads and cereals  · Bulgur wheat  · Brown rice     · Whole-wheat pasta  · Fruits and vegetables  · Dry beans, and peas   Keep track of the amount of carbohydrates you eat. This can help you keep the right balance of physical activity and  medicine. The amount of carbohydrates needed will vary for each person. It depends on many things such as your health, the medicines you take, and how active you are. Your healthcare team will help you figure out the right amount of carbohydrates for you. You may start with around 45 to 60 grams of carbohydrates per meal, depending on your situation.   Here are some examples of foods containing about 15 grams of carbohydrates (1 serving of carbohydrates):  · 1/2 cup of canned or frozen fruit  · A small piece of fresh fruit (4 ounces)  · 1 slice of bread  · 1/2 cup of oatmeal  · 1/3 cup of rice  · 4 to 6 crackers  · 1/2 English muffin  · 1/2 cup of black beans  · 1/4 of a large baked potato (3 ounces)  · 2/3 cup of plain fat-free yogurt  · 1 cup of soup  · 1/2 cup of casserole  · 6 chicken nuggets  · 2-inch-square brownie or cake without frosting  · 2 small cookies  · 1/2 cup of ice cream or sherbet  Choose healthy protein foods  Eating protein that is low in fat can help you control your weight. It also helps keep your heart healthy. Low-fat protein foods include:  · Fish  · Plant proteins, such as dry beans and peas, nuts, and soy products like tofu and soymilk  · Lean meat with all visible fat removed  · Poultry with the skin removed  · Low-fat or nonfat milk, cheese, and yogurt  Limit unhealthy fats and sugar  Saturated and trans fats are unhealthy for your heart. They raise LDL (bad) cholesterol. Fat is also high in calories, so it can make you gain weight. To cut down on unhealthy fats and sugar, limit these foods:  · Butter or margarine  · Palm and palm kernel oils and coconut oil  · Cream  · Cheese  · Angelo  · Lunch meats     · Ice cream  · Sweet bakery goods such as pies, muffins, and donuts  · Jams and jellies  · Candy bars  · Regular sodas   How much to eat  The amount of food you eat affects your blood sugar. It also affects your weight. Your healthcare team will tell you how much of each type of food you  should eat.  · Use measuring cups and spoons and a food scale to measure serving sizes.  · Learn what a correct serving size looks like on your plate. This will help when you are away from home and cant measure your servings.  · Eat only the number of servings given on your meal plan for each food. Dont take seconds.  · Learn to read food labels. Be sure to look at serving size, total carbohydrates, fiber, calories, sugar, and saturated and trans fats. Look for healthier alternatives to foods that have added sugar.  · Plan ahead for parties so you can still have a good time without going overboard with unhealthy food choices. Set a good example yourself by bringing a healthy dish to pot lucks.   Choose healthy snacks  When it comes to snacks, we usually think about foods with added sugar and fats. But there are many other options for healthier snack choices. Here are a few snack ideas to choose from:  Snacks with less than 5 grams of carbohydrates  · 1 piece of string cheese  · 3 celery sticks plus 1 tablespoon of peanut butter  · 5 cherry tomatoes plus 1 tablespoon of ranch dressing  · 1 hard-boiled egg  · 1/4 cup of fresh blueberries  ·  5 baby carrots  · 1 cup of light popcorn  · 1/2 cup of sugar-free gelatin  · 15 almonds  Snacks with about 10 to 20 grams of carbohydrates  · 1/3 cup of hummus plus 1 cup of fresh cut nonstarchy vegetables (carrots, green peppers, broccoli, celery, or a combination)  · 1/2 cup of fresh or canned fruit plus 1/4 cup of cottage cheese  · 1/2 cup of tuna salad with 4 crackers  · 2 rice cakes and a tablespoon of peanut butter  · 1 small apple or orange  · 3 cups light popcorn  · 1/2 of a turkey sandwich (1 slice of whole-wheat bread, 2 ounces of turkey, and mustard)  Portion sizes are important to controlling your blood sugar and staying at a healthy weight. Stock up on healthy snack items so you always have them on hand.  When to eat  Your meal plan will likely include breakfast,  lunch, dinner, and some snacks.  · Try to eat your meals and snacks at about the same times each day.  · Eat all your meals and snacks. Skipping a meal or snack can make your blood sugar drop too low. It can also cause you to eat too much at the next meal or snack. Then your blood sugar could get too high.  Date Last Reviewed: 7/1/2016  © 1878-9633 [employer]. [address] [place] [address], [place] [address]. All rights reserved. This information is not intended as a substitute for professional medical care. Always follow your healthcare professional's instructions.

## 2019-02-14 NOTE — PROGRESS NOTES
SUBJECTIVE:      Patient ID: Theo Chen Jr. is a 73 y.o. male.    Chief Complaint: Diabetes (3 month f/u, med refills)    Mr. Chen is here for routine follow up for diabetes.  He states he is taking his medication as directed.  He continues to eat sweets daily and is not exercising.  His A1C has improved slightly.  He denies new complaints since his last visit.  He does states that he is getting up more frequently at night to urinate.      Diabetes   He presents for his follow-up diabetic visit. He has type 2 diabetes mellitus. His disease course has been improving. Hypoglycemia symptoms include sweats. Pertinent negatives for hypoglycemia include no confusion, dizziness, headaches, nervousness/anxiousness or pallor. Pertinent negatives for diabetes include no chest pain, no fatigue, no polydipsia, no polyphagia, no polyuria and no weakness. There are no hypoglycemic complications. Symptoms are stable. Diabetic complications include nephropathy. Risk factors for coronary artery disease include diabetes mellitus, dyslipidemia, hypertension, male sex, obesity and sedentary lifestyle. Current diabetic treatment includes oral agent (triple therapy) and diet. He is compliant with treatment some of the time. His weight is stable. When asked about meal planning, he reported none. He has not had a previous visit with a dietitian. He rarely participates in exercise. His breakfast blood glucose range is generally 140-180 mg/dl. An ACE inhibitor/angiotensin II receptor blocker is being taken. He does not see a podiatrist.Eye exam is not current.       Past Surgical History:   Procedure Laterality Date    APPENDECTOMY      EYE SURGERY Left      Family History   Problem Relation Age of Onset    Hypertension Mother     Heart disease Father       Social History     Socioeconomic History    Marital status:      Spouse name: None    Number of children: None    Years of education: None    Highest education  level: None   Social Needs    Financial resource strain: None    Food insecurity - worry: None    Food insecurity - inability: None    Transportation needs - medical: None    Transportation needs - non-medical: None   Occupational History    Occupation: retired    Tobacco Use    Smoking status: Former Smoker    Smokeless tobacco: Never Used   Substance and Sexual Activity    Alcohol use: No    Drug use: No    Sexual activity: None   Other Topics Concern    None   Social History Narrative    None     Current Outpatient Medications   Medication Sig Dispense Refill    aspirin (ECOTRIN) 81 MG EC tablet Take 1 tablet by mouth 2 (two) times daily.       glipiZIDE (GLUCOTROL) 10 MG tablet Take 1 tablet (10 mg total) by mouth 2 (two) times daily. 180 tablet 1    ibuprofen (MOTRIN IB) 200 MG tablet Take 2 tablets (400 mg total) by mouth every 8 (eight) hours as needed for Pain. 30 tablet 0    lisinopril 10 MG tablet Take 1 tablet (10 mg total) by mouth once daily. 90 tablet 1    metFORMIN (GLUCOPHAGE) 1000 MG tablet Take 1 tablet (1,000 mg total) by mouth 2 (two) times daily. 180 tablet 1    metoprolol tartrate (LOPRESSOR) 25 MG tablet Take 1 tablet (25 mg total) by mouth 2 (two) times daily. To keep heart rate down 180 tablet 1    pravastatin (PRAVACHOL) 20 MG tablet Take 1 tablet (20 mg total) by mouth nightly. 90 tablet 1    ranitidine (ZANTAC) 300 MG tablet Take 1 tablet (300 mg total) by mouth every evening. 30 tablet 11    tamsulosin (FLOMAX) 0.4 mg Cap Take 2 capsules (0.8 mg total) by mouth once daily. 180 capsule 1    doxazosin (CARDURA) 2 MG tablet Take 1 tablet (2 mg total) by mouth every evening. 30 tablet 2    nystatin (MYCOSTATIN) cream APPLY TO AFFECTED AREA(S) TWICE A DAY. 60 g 0    SITagliptin (JANUVIA) 100 MG Tab Take 1 tablet (100 mg total) by mouth once daily. 90 tablet 1     No current facility-administered medications for this visit.      Review of patient's allergies  "indicates:  No Known Allergies   Past Medical History:   Diagnosis Date    Diabetes mellitus, type 2     Gastroenteritis     Seasonal allergic rhinitis due to pollen     Shingles      Past Surgical History:   Procedure Laterality Date    APPENDECTOMY      EYE SURGERY Left        Review of Systems   Constitutional: Negative for activity change, appetite change, chills, diaphoresis, fatigue, fever and unexpected weight change.   HENT: Negative for congestion, nosebleeds, postnasal drip, rhinorrhea, sore throat and voice change.    Eyes: Negative for pain, discharge and visual disturbance.   Respiratory: Negative for apnea, cough, shortness of breath and wheezing.    Cardiovascular: Negative for chest pain, palpitations and leg swelling.   Gastrointestinal: Negative for abdominal pain, constipation, diarrhea, nausea and vomiting.   Endocrine: Negative for polydipsia, polyphagia and polyuria.   Genitourinary: Positive for frequency and urgency. Negative for difficulty urinating, dysuria and testicular pain.   Musculoskeletal: Negative for arthralgias, gait problem, myalgias and neck pain.   Skin: Negative for color change, pallor and rash.   Allergic/Immunologic: Negative for immunocompromised state.   Neurological: Negative for dizziness, syncope, weakness, numbness and headaches.   Hematological: Negative for adenopathy. Does not bruise/bleed easily.   Psychiatric/Behavioral: Negative for confusion, dysphoric mood, self-injury, sleep disturbance and suicidal ideas. The patient is not nervous/anxious.       OBJECTIVE:      Vitals:    02/14/19 0804 02/14/19 0823   BP: (!) 140/88 122/80  Comment: at end of exam   BP Location:  Left arm   Patient Position:  Sitting   BP Method:  Medium (Manual)   Pulse: 100    SpO2: 96%    Weight: 96.3 kg (212 lb 3.2 oz)    Height: 5' 9" (1.753 m)      Physical Exam   Constitutional: He is oriented to person, place, and time. He appears well-developed and well-nourished. No " distress.   HENT:   Head: Normocephalic and atraumatic.   Right Ear: External ear normal.   Left Ear: External ear normal.   Nose: Nose normal.   Mouth/Throat: Oropharynx is clear and moist. No oropharyngeal exudate.   Eyes: Conjunctivae, EOM and lids are normal. Pupils are equal, round, and reactive to light. Right eye exhibits no discharge. Left eye exhibits no discharge. No scleral icterus.   Neck: Normal range of motion. Neck supple. Carotid bruit is not present. No tracheal deviation present. No thyromegaly present.   Cardiovascular: Normal rate, regular rhythm, normal heart sounds and intact distal pulses. Exam reveals no gallop and no friction rub.   No murmur heard.  Pulmonary/Chest: Effort normal and breath sounds normal. No stridor. No respiratory distress. He has no wheezes. He has no rales.   Abdominal: Soft. Bowel sounds are normal. There is no tenderness.   Musculoskeletal: Normal range of motion. He exhibits no edema.   Lymphadenopathy:     He has no cervical adenopathy.   Neurological: He is alert and oriented to person, place, and time.   Skin: Skin is warm, dry and intact. He is not diaphoretic.   Psychiatric: He has a normal mood and affect. His behavior is normal. Judgment and thought content normal. He expresses no suicidal plans.      Assessment:       1. Type 2 diabetes mellitus with stage 3 chronic kidney disease, without long-term current use of insulin    2. Essential hypertension    3. Excessive urination at night    4. Dyslipidemia    5. Screening for colon cancer    6. Screening for prostate cancer        Plan:       Type 2 diabetes mellitus with stage 3 chronic kidney disease, without long-term current use of insulin   Stable; continue current medications.  DIET and EXERCISE!  -     SITagliptin (JANUVIA) 100 MG Tab; Take 1 tablet (100 mg total) by mouth once daily.  Dispense: 90 tablet; Refill: 1  -     metFORMIN (GLUCOPHAGE) 1000 MG tablet; Take 1 tablet (1,000 mg total) by mouth 2  (two) times daily.  Dispense: 180 tablet; Refill: 1  -     lisinopril 10 MG tablet; Take 1 tablet (10 mg total) by mouth once daily.  Dispense: 90 tablet; Refill: 1  -     glipiZIDE (GLUCOTROL) 10 MG tablet; Take 1 tablet (10 mg total) by mouth 2 (two) times daily.  Dispense: 180 tablet; Refill: 1  -     Comprehensive metabolic panel; Future; Expected date: 02/14/2019  -     Hemoglobin A1c; Future; Expected date: 02/14/2019    Essential hypertension   Stable; continue current medications.  -     metoprolol tartrate (LOPRESSOR) 25 MG tablet; Take 1 tablet (25 mg total) by mouth 2 (two) times daily. To keep heart rate down  Dispense: 180 tablet; Refill: 1  -     lisinopril 10 MG tablet; Take 1 tablet (10 mg total) by mouth once daily.  Dispense: 90 tablet; Refill: 1    Excessive urination at night  -     doxazosin (CARDURA) 2 MG tablet; Take 1 tablet (2 mg total) by mouth every evening.  Dispense: 30 tablet; Refill: 2  -     tamsulosin (FLOMAX) 0.4 mg Cap; Take 2 capsules (0.8 mg total) by mouth once daily.  Dispense: 180 capsule; Refill: 1    Dyslipidemia   Stable; continue current medications.  -     pravastatin (PRAVACHOL) 20 MG tablet; Take 1 tablet (20 mg total) by mouth nightly.  Dispense: 90 tablet; Refill: 1    Screening for colon cancer  -     Cologuard Screening (Multitarget Stool DNA); Future; Expected date: 02/14/2019    Screening for prostate cancer  -     PSA, Screening; Future; Expected date: 02/14/2019        Follow-up in about 3 months (around 5/14/2019) for diabetes.      2/14/2019 Juanita Delgado, RONNIE, FNP

## 2019-03-13 DIAGNOSIS — B35.4 TINEA CORPORIS: Primary | ICD-10-CM

## 2019-03-13 RX ORDER — FLUCONAZOLE 150 MG/1
150 TABLET ORAL DAILY
Qty: 2 TABLET | Refills: 0 | Status: SHIPPED | OUTPATIENT
Start: 2019-03-13 | End: 2019-03-14

## 2019-03-13 NOTE — TELEPHONE ENCOUNTER
Pt called requesting a refill of Diflucan 150 mg #2. Pt was last seen on 2/14/19. Last prescribed on 11/14/18.

## 2019-04-16 ENCOUNTER — TELEPHONE (OUTPATIENT)
Dept: FAMILY MEDICINE | Facility: CLINIC | Age: 74
End: 2019-04-16

## 2019-04-16 NOTE — TELEPHONE ENCOUNTER
"Patient has refused to do the cologuard testing. When asked why, his answer was " I don't want to"  "

## 2019-04-16 NOTE — TELEPHONE ENCOUNTER
----- Message from Emily Mejia sent at 4/16/2019  9:13 AM CDT -----  LOUANN EXACT BRYNN, COLOGUAOBIE ORDER CANCELLATION, 4/15/19

## 2019-05-08 LAB
ALBUMIN SERPL-MCNC: 4.6 G/DL (ref 3.5–4.8)
ALBUMIN/GLOB SERPL: 2 {RATIO} (ref 1.2–2.2)
ALP SERPL-CCNC: 56 IU/L (ref 39–117)
ALT SERPL-CCNC: 24 IU/L (ref 0–44)
AST SERPL-CCNC: 21 IU/L (ref 0–40)
BILIRUB SERPL-MCNC: 0.5 MG/DL (ref 0–1.2)
BUN SERPL-MCNC: 19 MG/DL (ref 8–27)
BUN/CREAT SERPL: 12 (ref 10–24)
CALCIUM SERPL-MCNC: 9.6 MG/DL (ref 8.6–10.2)
CHLORIDE SERPL-SCNC: 102 MMOL/L (ref 96–106)
CO2 SERPL-SCNC: 24 MMOL/L (ref 20–29)
CREAT SERPL-MCNC: 1.64 MG/DL (ref 0.76–1.27)
GLOBULIN SER CALC-MCNC: 2.3 G/DL (ref 1.5–4.5)
GLUCOSE SERPL-MCNC: 143 MG/DL (ref 65–99)
HBA1C MFR BLD: 7.4 % (ref 4.8–5.6)
POTASSIUM SERPL-SCNC: 5.5 MMOL/L (ref 3.5–5.2)
PROT SERPL-MCNC: 6.9 G/DL (ref 6–8.5)
PSA SERPL-MCNC: 0.7 NG/ML (ref 0–4)
SODIUM SERPL-SCNC: 139 MMOL/L (ref 134–144)

## 2019-05-15 ENCOUNTER — OFFICE VISIT (OUTPATIENT)
Dept: FAMILY MEDICINE | Facility: CLINIC | Age: 74
End: 2019-05-15
Payer: MEDICARE

## 2019-05-15 VITALS
SYSTOLIC BLOOD PRESSURE: 110 MMHG | DIASTOLIC BLOOD PRESSURE: 70 MMHG | WEIGHT: 212.19 LBS | HEART RATE: 72 BPM | OXYGEN SATURATION: 98 % | HEIGHT: 69 IN | BODY MASS INDEX: 31.43 KG/M2

## 2019-05-15 DIAGNOSIS — R05.9 COUGH: ICD-10-CM

## 2019-05-15 DIAGNOSIS — I10 ESSENTIAL HYPERTENSION: ICD-10-CM

## 2019-05-15 DIAGNOSIS — N18.30 TYPE 2 DIABETES MELLITUS WITH STAGE 3 CHRONIC KIDNEY DISEASE, WITHOUT LONG-TERM CURRENT USE OF INSULIN: Primary | ICD-10-CM

## 2019-05-15 DIAGNOSIS — J30.1 HAY FEVER: ICD-10-CM

## 2019-05-15 DIAGNOSIS — E11.22 TYPE 2 DIABETES MELLITUS WITH STAGE 3 CHRONIC KIDNEY DISEASE, WITHOUT LONG-TERM CURRENT USE OF INSULIN: Primary | ICD-10-CM

## 2019-05-15 DIAGNOSIS — E78.5 DYSLIPIDEMIA: ICD-10-CM

## 2019-05-15 DIAGNOSIS — R35.1 NOCTURIA: ICD-10-CM

## 2019-05-15 DIAGNOSIS — R35.1 EXCESSIVE URINATION AT NIGHT: ICD-10-CM

## 2019-05-15 PROCEDURE — 99214 PR OFFICE/OUTPT VISIT, EST, LEVL IV, 30-39 MIN: ICD-10-PCS | Mod: ,,, | Performed by: NURSE PRACTITIONER

## 2019-05-15 PROCEDURE — 99214 OFFICE O/P EST MOD 30 MIN: CPT | Mod: ,,, | Performed by: NURSE PRACTITIONER

## 2019-05-15 RX ORDER — FINASTERIDE 5 MG/1
5 TABLET, FILM COATED ORAL NIGHTLY
Qty: 90 TABLET | Refills: 1 | Status: SHIPPED | OUTPATIENT
Start: 2019-05-15 | End: 2019-12-19

## 2019-05-15 RX ORDER — DOXAZOSIN 2 MG/1
TABLET ORAL
Qty: 90 TABLET | Refills: 1 | Status: SHIPPED | OUTPATIENT
Start: 2019-05-15 | End: 2020-06-29 | Stop reason: SDUPTHER

## 2019-05-15 RX ORDER — FLUTICASONE PROPIONATE 50 MCG
2 SPRAY, SUSPENSION (ML) NASAL DAILY
Qty: 1 BOTTLE | Refills: 3 | Status: SHIPPED | OUTPATIENT
Start: 2019-05-15 | End: 2020-06-01 | Stop reason: SDUPTHER

## 2019-05-15 RX ORDER — ALBUTEROL SULFATE 90 UG/1
2 AEROSOL, METERED RESPIRATORY (INHALATION) EVERY 6 HOURS PRN
Qty: 1 INHALER | Refills: 2 | Status: SHIPPED | OUTPATIENT
Start: 2019-05-15 | End: 2020-06-01 | Stop reason: SDUPTHER

## 2019-05-15 NOTE — PROGRESS NOTES
SUBJECTIVE:      Patient ID: Theo Chen Jr. is a 73 y.o. male.    Chief Complaint: Diabetes (3 month f/u) and Hypertension    Mr. Venegas is here today for routine follow up for diabetes, hypertension and nocturia.  He states he has not made any changes in his diet, but his A1c has come down to 7.4.  He does not check his BP at home.  He does not exercise.  He has c/o increasing fatigue since starting on metoprolol and he states he is getting up every 2 hours at night to pee.  His PSA is 0.7    Diabetes   He presents for his follow-up diabetic visit. He has type 2 diabetes mellitus. No MedicAlert identification noted. His disease course has been fluctuating. Hypoglycemia symptoms include dizziness, headaches, hunger, mood changes, sleepiness and sweats. Pertinent negatives for hypoglycemia include no confusion, nervousness/anxiousness or pallor. Associated symptoms include fatigue, polyuria and weakness. Pertinent negatives for diabetes include no blurred vision, no chest pain, no foot paresthesias, no foot ulcerations, no polydipsia, no polyphagia, no visual change and no weight loss. There are no hypoglycemic complications. Symptoms are worsening. There are no diabetic complications. Risk factors for coronary artery disease include diabetes mellitus, dyslipidemia, family history, hypertension, male sex, obesity and sedentary lifestyle. Current diabetic treatment includes oral agent (triple therapy). His weight is stable. He is following a generally unhealthy diet. When asked about meal planning, he reported none. He has not had a previous visit with a dietitian. He rarely participates in exercise. An ACE inhibitor/angiotensin II receptor blocker is being taken. He does not see a podiatrist.Eye exam is not current.   Hypertension   This is a chronic problem. The current episode started more than 1 year ago. The problem is controlled. Associated symptoms include headaches, malaise/fatigue, shortness of  breath and sweats. Pertinent negatives include no anxiety, blurred vision, chest pain, neck pain, orthopnea, palpitations, peripheral edema or PND. Past treatments include ACE inhibitors, alpha 1 blockers and beta blockers. The current treatment provides significant improvement. Compliance problems include diet and exercise.        Past Surgical History:   Procedure Laterality Date    APPENDECTOMY      EYE SURGERY Left      Family History   Problem Relation Age of Onset    Hypertension Mother     Heart disease Father       Social History     Socioeconomic History    Marital status:      Spouse name: Not on file    Number of children: Not on file    Years of education: Not on file    Highest education level: Not on file   Occupational History    Occupation: retired    Social Needs    Financial resource strain: Not on file    Food insecurity:     Worry: Not on file     Inability: Not on file    Transportation needs:     Medical: Not on file     Non-medical: Not on file   Tobacco Use    Smoking status: Former Smoker    Smokeless tobacco: Never Used   Substance and Sexual Activity    Alcohol use: No    Drug use: No    Sexual activity: Not on file   Lifestyle    Physical activity:     Days per week: Not on file     Minutes per session: Not on file    Stress: Not on file   Relationships    Social connections:     Talks on phone: Not on file     Gets together: Not on file     Attends Church service: Not on file     Active member of club or organization: Not on file     Attends meetings of clubs or organizations: Not on file     Relationship status: Not on file   Other Topics Concern    Not on file   Social History Narrative    Not on file     Current Outpatient Medications   Medication Sig Dispense Refill    aspirin (ECOTRIN) 81 MG EC tablet Take 1 tablet by mouth 2 (two) times daily.       doxazosin (CARDURA) 2 MG tablet Take 1 tablet (2 mg total) by mouth every evening. 30 tablet 2     glipiZIDE (GLUCOTROL) 10 MG tablet Take 1 tablet (10 mg total) by mouth 2 (two) times daily. 180 tablet 1    lisinopril 10 MG tablet Take 1 tablet (10 mg total) by mouth once daily. 90 tablet 1    metFORMIN (GLUCOPHAGE) 1000 MG tablet Take 1 tablet (1,000 mg total) by mouth 2 (two) times daily. 180 tablet 1    metoprolol tartrate (LOPRESSOR) 25 MG tablet Take 1 tablet (25 mg total) by mouth 2 (two) times daily. To keep heart rate down 180 tablet 1    nystatin (MYCOSTATIN) cream APPLY TO AFFECTED AREA(S) TWICE A DAY. 60 g 0    pravastatin (PRAVACHOL) 20 MG tablet Take 1 tablet (20 mg total) by mouth nightly. 90 tablet 1    ranitidine (ZANTAC) 300 MG tablet Take 1 tablet (300 mg total) by mouth every evening. 30 tablet 11    SITagliptin (JANUVIA) 100 MG Tab Take 1 tablet (100 mg total) by mouth once daily. 90 tablet 1    tamsulosin (FLOMAX) 0.4 mg Cap Take 2 capsules (0.8 mg total) by mouth once daily. 180 capsule 1    albuterol (PROVENTIL/VENTOLIN HFA) 90 mcg/actuation inhaler Inhale 2 puffs into the lungs every 6 (six) hours as needed for Wheezing. 1 Inhaler 2    finasteride (PROSCAR) 5 mg tablet Take 1 tablet (5 mg total) by mouth nightly. 90 tablet 1    fluticasone propionate (FLONASE) 50 mcg/actuation nasal spray 2 sprays (100 mcg total) by Each Nare route once daily. 1 Bottle 3     No current facility-administered medications for this visit.      Review of patient's allergies indicates:  No Known Allergies   Past Medical History:   Diagnosis Date    Diabetes mellitus, type 2     Gastroenteritis     Seasonal allergic rhinitis due to pollen     Shingles      Past Surgical History:   Procedure Laterality Date    APPENDECTOMY      EYE SURGERY Left        Review of Systems   Constitutional: Positive for fatigue and malaise/fatigue. Negative for activity change, appetite change, chills, diaphoresis, fever, unexpected weight change and weight loss.   HENT: Positive for congestion and postnasal drip.  "Negative for nosebleeds, rhinorrhea, sore throat and voice change.    Eyes: Negative for blurred vision, pain, discharge and visual disturbance.   Respiratory: Positive for cough and shortness of breath. Negative for apnea and wheezing.    Cardiovascular: Negative for chest pain, palpitations, orthopnea, leg swelling and PND.   Gastrointestinal: Negative for abdominal pain, constipation, diarrhea, nausea and vomiting.   Endocrine: Positive for polyuria. Negative for polydipsia and polyphagia.   Genitourinary: Positive for frequency and urgency. Negative for difficulty urinating, dysuria and testicular pain.   Musculoskeletal: Positive for arthralgias, back pain and myalgias. Negative for gait problem and neck pain.   Skin: Negative for color change, pallor and rash.   Allergic/Immunologic: Negative for immunocompromised state.   Neurological: Positive for dizziness, weakness and headaches. Negative for syncope and numbness.   Hematological: Negative for adenopathy. Does not bruise/bleed easily.   Psychiatric/Behavioral: Negative for confusion, dysphoric mood, self-injury, sleep disturbance and suicidal ideas. The patient is not nervous/anxious.       OBJECTIVE:      Vitals:    05/15/19 0751   BP: 110/70   Pulse: 72   SpO2: 98%   Weight: 96.3 kg (212 lb 3.2 oz)   Height: 5' 9" (1.753 m)     Physical Exam   Constitutional: He is oriented to person, place, and time. He appears well-developed and well-nourished. No distress.   HENT:   Head: Normocephalic and atraumatic.   Right Ear: External ear normal.   Left Ear: External ear normal.   Nose: Nose normal.   Mouth/Throat: Oropharynx is clear and moist. No oropharyngeal exudate.   Eyes: Pupils are equal, round, and reactive to light. Conjunctivae, EOM and lids are normal. Right eye exhibits no discharge. Left eye exhibits no discharge. No scleral icterus.   Neck: Normal range of motion. Neck supple. Carotid bruit is not present. No tracheal deviation present. No " thyromegaly present.   Cardiovascular: Normal rate, regular rhythm and normal heart sounds. Exam reveals no gallop and no friction rub.   No murmur heard.  Pulmonary/Chest: Effort normal and breath sounds normal. No stridor. No respiratory distress. He has no wheezes. He has no rales.   Abdominal: Soft. Bowel sounds are normal.   Musculoskeletal: Normal range of motion.   Lymphadenopathy:     He has no cervical adenopathy.   Neurological: He is alert and oriented to person, place, and time.   Skin: Skin is warm, dry and intact. He is not diaphoretic. No erythema. No pallor.   Psychiatric: He has a normal mood and affect. He expresses no suicidal plans.      Assessment:       1. Type 2 diabetes mellitus with stage 3 chronic kidney disease, without long-term current use of insulin    2. Essential hypertension    3. Dyslipidemia    4. Cough    5. Nocturia    6. Hay fever        Plan:       Type 2 diabetes mellitus with stage 3 chronic kidney disease, without long-term current use of insulin   No ibuprofen; increase water intake, cut metformin in half; recheck in 1 month  -     Renal function panel; Future; Expected date: 05/15/2019    Essential hypertension   Cut metoprolol in half due to increasing fatigue    Dyslipidemia   Stable; continue current medications.    Cough   Albuterol inhaler prn; treat allergies; CXR in Oct normal    Nocturia  -     finasteride (PROSCAR) 5 mg tablet; Take 1 tablet (5 mg total) by mouth nightly.  Dispense: 90 tablet; Refill: 1        Follow up in about 1 month (around 6/15/2019) for labs.      5/15/2019 RONNIE Kennedy, KATIEP

## 2019-05-15 NOTE — PATIENT INSTRUCTIONS
Eating Heart-Healthy Food: Using the DASH Plan    Eating for your heart doesnt have to be hard or boring. You just need to know how to make healthier choices. The DASH eating plan has been developed to help you do just that. DASH stands for Dietary Approaches to Stop Hypertension. It is a plan that has been proven to be healthier for your heart and to lower your risk for high blood pressure. It can also help lower your risk for cancer, heart disease, osteoporosis, and diabetes.  Choosing from each food group  Choose foods from each of the food groups below each day. Try to get the recommended number of servings for each food group. The serving numbers are based on a diet of 2,000 calories a day. Talk to your doctor if youre unsure about your calorie needs. Along with getting the correct servings, the DASH plan also recommends a sodium intake less than 2,300 mg per day.        Grains  Servings: 6 to 8 a day  A serving is:  · 1 slice bread  · 1 ounce dry cereal  · Half a cup cooked rice, pasta or cereal  Best choices: Whole grains and any grains high in fiber. Vegetables  Servings: 4 to 5 a day  A serving is:  · 1 cup raw leafy vegetable  · Half a cup cut-up raw or cooked vegetable  · Half a cup vegetable juice  Best choices: Fresh or frozen vegetables prepared without added salt or fat.   Fruits  Servings: 4 to 5 a day  A serving is:  · 1 medium fruit  · One-quarter cup dried fruit  · Half a cup fresh, frozen, or canned fruit  · Half a cup of 100% fruit juices  Best choices: A variety of fresh fruits of different colors. Whole fruits are a better choice than fruit juices. Low-fat or fat-free dairy  Servings: 2 to 3 a day  A serving is:  · 1 cup milk  · 1 cup yogurt  · One and a half ounces cheese  Best choices: Skim or 1% milk, low-fat or fat-free yogurt or buttermilk, and low-fat cheeses.         Lean meats, poultry, fish  Servings: 6 or fewer a day  A serving is:  · 1 ounce cooked meats, poultry, or fish  · 1  egg  Best choices: Lean poultry and fish. Trim away visible fat. Broil, grill, roast, or boil instead of frying. Remove skin from poultry before eating. Limit how much red meat you eat.  Nuts, seeds, beans  Servings: 4 to 5 a week  A serving is:  · One-third cup nuts (one and a half ounces)  · 2 tablespoons nut butter or seeds  · Half a cup cooked dry beans or legumes  Best choices: Dry roasted nuts with no salt added, lentils, kidney beans, garbanzo beans, and whole mccauley beans.   Fats and oils  Servings: 2 to 3 a day  A serving is:  · 1 teaspoon vegetable oil  · 1 teaspoon soft margarine  · 1 tablespoon mayonnaise  · 2 tablespoons salad dressing  Best choices: Nut and vegetable oils (nontropical vegetable oils), such as olive and canola oil. Sweets  Servings: 5 a week or fewer  A serving is:  · 1 tablespoon sugar, maple syrup, or honey  · 1 tablespoon jam or jelly  · 1 half-ounce jelly beans (about 15)  · 1 cup lemonade  Best choices: Dried fruit can be a satisfying sweet. Choose low-fat sweets. And watch your serving sizes!      For more on the DASH eating plan, visit:  www.nhlbi.nih.gov/health/health-topics/topics/dash   Date Last Reviewed: 6/1/2016  © 6170-7500 [employer]. [address] [place] [address], [place] [address]. All rights reserved. This information is not intended as a substitute for professional medical care. Always follow your healthcare professional's instructions.        Aerobic Exercise for a Healthy Heart  Exercise is a lot more than an energy booster and a stress reliever. It also strengthens your heart muscle, lowers your blood pressure and cholesterol, and burns calories. It can also improve your resting muscle tone, and your mood.     Remember, some activity is better than none.    Choose an aerobic activity  Choose an activity that makes your heart and lungs work harder than they do when you rest or walk normally. This aerobic exercise can improve the way your heart and other  muscles use oxygen. Make it fun by exercising with a friend and choosing an activity you enjoy. Here are some ideas:  · Walking  · Swimming  · Bicycling  · Stair climbing  · Dancing  · Jogging  · Gardening  Exercise regularly  If you havent been exercising regularly,  get your doctors OK first. Then start slowly.  Here are some tips:  · Begin exercising 3 times a week for 5 to 10 minutes at a time.  · When you feel comfortable, add a few minutes each session.  · Slowly build up to exercising 3 to 4 times each week. Each session should last for 40 minutes, on average, and involve moderate- to vigorous-intensity physical activity.  · If you have been given nitroglycerin, be sure to carry it when you exercise.  · If you get chest pain (angina) when youre exercising, stop what youre doing, take your nitroglycerin, and call your doctor.  Date Last Reviewed: 6/2/2016  © 3013-3329 [employer]. [address] [place] [address], [place] [address]. All rights reserved. This information is not intended as a substitute for professional medical care. Always follow your healthcare professional's instructions.

## 2019-06-13 LAB
ALBUMIN SERPL-MCNC: 4.5 G/DL (ref 3.5–4.8)
BUN SERPL-MCNC: 17 MG/DL (ref 8–27)
BUN/CREAT SERPL: 12 (ref 10–24)
CALCIUM SERPL-MCNC: 9.7 MG/DL (ref 8.6–10.2)
CHLORIDE SERPL-SCNC: 99 MMOL/L (ref 96–106)
CO2 SERPL-SCNC: 22 MMOL/L (ref 20–29)
CREAT SERPL-MCNC: 1.47 MG/DL (ref 0.76–1.27)
GLUCOSE SERPL-MCNC: 92 MG/DL (ref 65–99)
PHOSPHATE SERPL-MCNC: 4.6 MG/DL (ref 2.5–4.5)
POTASSIUM SERPL-SCNC: 4.8 MMOL/L (ref 3.5–5.2)
SODIUM SERPL-SCNC: 138 MMOL/L (ref 134–144)

## 2019-06-13 NOTE — PROGRESS NOTES
Please notify pt his renal function is improving but still elevated- glucose normal; continue what Juanita discussed and follow up as discussed with Juanita (I believe she wanted to see him in June)

## 2019-07-02 ENCOUNTER — OFFICE VISIT (OUTPATIENT)
Dept: FAMILY MEDICINE | Facility: CLINIC | Age: 74
End: 2019-07-02
Payer: MEDICARE

## 2019-07-02 VITALS
HEIGHT: 69 IN | BODY MASS INDEX: 31.2 KG/M2 | WEIGHT: 210.63 LBS | OXYGEN SATURATION: 97 % | SYSTOLIC BLOOD PRESSURE: 100 MMHG | DIASTOLIC BLOOD PRESSURE: 64 MMHG | HEART RATE: 88 BPM

## 2019-07-02 DIAGNOSIS — E11.22 TYPE 2 DIABETES MELLITUS WITH STAGE 3 CHRONIC KIDNEY DISEASE, WITHOUT LONG-TERM CURRENT USE OF INSULIN: ICD-10-CM

## 2019-07-02 DIAGNOSIS — E78.5 DYSLIPIDEMIA: ICD-10-CM

## 2019-07-02 DIAGNOSIS — N18.30 TYPE 2 DIABETES MELLITUS WITH STAGE 3 CHRONIC KIDNEY DISEASE, WITHOUT LONG-TERM CURRENT USE OF INSULIN: ICD-10-CM

## 2019-07-02 DIAGNOSIS — I10 ESSENTIAL HYPERTENSION: ICD-10-CM

## 2019-07-02 DIAGNOSIS — N28.9 RENAL INSUFFICIENCY: Primary | ICD-10-CM

## 2019-07-02 DIAGNOSIS — R35.1 EXCESSIVE URINATION AT NIGHT: ICD-10-CM

## 2019-07-02 DIAGNOSIS — J30.1 HAY FEVER: ICD-10-CM

## 2019-07-02 PROCEDURE — 99214 PR OFFICE/OUTPT VISIT, EST, LEVL IV, 30-39 MIN: ICD-10-PCS | Mod: ,,, | Performed by: NURSE PRACTITIONER

## 2019-07-02 PROCEDURE — 99214 OFFICE O/P EST MOD 30 MIN: CPT | Mod: ,,, | Performed by: NURSE PRACTITIONER

## 2019-07-02 RX ORDER — PRAVASTATIN SODIUM 20 MG/1
20 TABLET ORAL NIGHTLY
Qty: 90 TABLET | Refills: 1 | Status: SHIPPED | OUTPATIENT
Start: 2019-07-02 | End: 2020-06-01 | Stop reason: SDUPTHER

## 2019-07-02 RX ORDER — MONTELUKAST SODIUM 10 MG/1
10 TABLET ORAL NIGHTLY
Qty: 30 TABLET | Refills: 1 | Status: SHIPPED | OUTPATIENT
Start: 2019-07-02 | End: 2019-08-01

## 2019-07-02 RX ORDER — TAMSULOSIN HYDROCHLORIDE 0.4 MG/1
0.8 CAPSULE ORAL DAILY
Qty: 180 CAPSULE | Refills: 1 | Status: SHIPPED | OUTPATIENT
Start: 2019-07-02 | End: 2020-06-01 | Stop reason: SDUPTHER

## 2019-07-02 RX ORDER — ASPIRIN 81 MG/1
81 TABLET ORAL 2 TIMES DAILY
COMMUNITY
Start: 2019-07-02

## 2019-07-02 RX ORDER — LISINOPRIL 10 MG/1
10 TABLET ORAL DAILY
Qty: 90 TABLET | Refills: 1 | Status: SHIPPED | OUTPATIENT
Start: 2019-07-02 | End: 2020-06-01

## 2019-07-02 RX ORDER — METOPROLOL TARTRATE 25 MG/1
25 TABLET, FILM COATED ORAL 2 TIMES DAILY
Qty: 180 TABLET | Refills: 1 | Status: SHIPPED | OUTPATIENT
Start: 2019-07-02 | End: 2020-06-01 | Stop reason: SDUPTHER

## 2019-07-02 RX ORDER — GLIPIZIDE 10 MG/1
10 TABLET ORAL 2 TIMES DAILY
Qty: 180 TABLET | Refills: 1 | Status: SHIPPED | OUTPATIENT
Start: 2019-07-02 | End: 2020-06-01 | Stop reason: SDUPTHER

## 2019-07-02 RX ORDER — METFORMIN HYDROCHLORIDE 1000 MG/1
1000 TABLET ORAL 2 TIMES DAILY
Qty: 180 TABLET | Refills: 1 | Status: SHIPPED | OUTPATIENT
Start: 2019-07-02 | End: 2020-06-01 | Stop reason: SDUPTHER

## 2019-07-02 NOTE — PATIENT INSTRUCTIONS
Long-Term Complications of Diabetes    Diabetes can cause health problems over time. These are called complications. They are more likely to happen if your blood sugar is often too high. Over time, high blood sugar can damage blood vessels in your body. It is important to keep your blood sugar in your target range. This can help prevent or delay complications from diabetes.  Possible complications  Complications of diabetes include:  · Eye problems, including damage to the blood vessels in the eyes (retinopathy), pressure in the eye (glaucoma), and clouding of the eyes lens (a cataract). Eye problems can eventually lead to irreversible blindness.   · Tooth and gum problems (periodontal disease), causing loss of teeth and bone  · Blood vessel (vascular) disease leading to circulation problems, heart attack or stroke, or a need for amputation of a limb   · Problems with sexual function leading to erectile dysfunction in men and sexual discomfort in women   · Kidney disease (nephropathy) can eventually lead to kidney failure, which may require dialysis or kidney transplant   · Nerve problems (neuropathy), causing pain or loss of feeling in your feet and other parts of your body, potentially leading to an amputation of a limb   · High blood pressure (hypertension), putting strain on your heart and blood vessels  · Serious infections, possibly leading to loss of toes, feet, or limbs  How to avoid complications  The serious consequences of these complications may be avoidable for most people with diabetes by managing your blood glucose, blood pressure, and cholesterol levels. This can help you feel better and stay healthy. You can manage diabetes by tracking your blood sugar. You can also eat healthy and exercise to avoid gaining weight. And you should take medicine if directed by your healthcare provider.  Date Last Reviewed: 5/1/2016  © 5765-8206 [employer]. [address] [place] [address], [place] [address].  All rights reserved. This information is not intended as a substitute for professional medical care. Always follow your healthcare professional's instructions.

## 2019-07-02 NOTE — PROGRESS NOTES
SUBJECTIVE:      Patient ID: Theo Chen Jr. is a 74 y.o. male.    Chief Complaint: Diabetes (f/u, med refill)    Mr. Chen is here today for medication refill.  He states he is not sure which ones he needs, but knows he is running low on most of them.  He reports his glucose usually runs around 140 but may be as high as 200, depending on what he is eating.  His renal function was improving on his last labs and he is drinking more water.    Diabetes   He presents for his follow-up diabetic visit. He has type 2 diabetes mellitus. No MedicAlert identification noted. His disease course has been fluctuating. Pertinent negatives for hypoglycemia include no confusion, dizziness, headaches, nervousness/anxiousness or pallor. Associated symptoms include polyuria. Pertinent negatives for diabetes include no blurred vision, no chest pain, no fatigue, no foot paresthesias, no foot ulcerations, no polydipsia, no polyphagia, no visual change, no weakness and no weight loss. There are no hypoglycemic complications. Symptoms are stable. There are no diabetic complications. Risk factors for coronary artery disease include diabetes mellitus, dyslipidemia, family history, hypertension, male sex, obesity and sedentary lifestyle. Current diabetic treatment includes diet and oral agent (triple therapy). He is compliant with treatment some of the time. His weight is stable. He is following a generally unhealthy diet. When asked about meal planning, he reported none. He has not had a previous visit with a dietitian. He participates in exercise intermittently. His home blood glucose trend is fluctuating minimally. An ACE inhibitor/angiotensin II receptor blocker is being taken. He does not see a podiatrist.Eye exam is not current.       Past Surgical History:   Procedure Laterality Date    APPENDECTOMY      EYE SURGERY Left      Family History   Problem Relation Age of Onset    Hypertension Mother     Heart disease Father        Social History     Socioeconomic History    Marital status:      Spouse name: Not on file    Number of children: Not on file    Years of education: Not on file    Highest education level: Not on file   Occupational History    Occupation: retired    Social Needs    Financial resource strain: Not on file    Food insecurity:     Worry: Not on file     Inability: Not on file    Transportation needs:     Medical: Not on file     Non-medical: Not on file   Tobacco Use    Smoking status: Former Smoker    Smokeless tobacco: Never Used   Substance and Sexual Activity    Alcohol use: No    Drug use: No    Sexual activity: Not on file   Lifestyle    Physical activity:     Days per week: Not on file     Minutes per session: Not on file    Stress: Not on file   Relationships    Social connections:     Talks on phone: Not on file     Gets together: Not on file     Attends Denominational service: Not on file     Active member of club or organization: Not on file     Attends meetings of clubs or organizations: Not on file     Relationship status: Not on file   Other Topics Concern    Not on file   Social History Narrative    Not on file     Current Outpatient Medications   Medication Sig Dispense Refill    albuterol (PROVENTIL/VENTOLIN HFA) 90 mcg/actuation inhaler Inhale 2 puffs into the lungs every 6 (six) hours as needed for Wheezing. 1 Inhaler 2    aspirin (ECOTRIN) 81 MG EC tablet Take 1 tablet (81 mg total) by mouth 2 (two) times daily.      doxazosin (CARDURA) 2 MG tablet TAKE ONE TABLET BY MOUTH EVERY EVENING. 90 tablet 1    finasteride (PROSCAR) 5 mg tablet Take 1 tablet (5 mg total) by mouth nightly. 90 tablet 1    fluticasone propionate (FLONASE) 50 mcg/actuation nasal spray 2 sprays (100 mcg total) by Each Nare route once daily. 1 Bottle 3    glipiZIDE (GLUCOTROL) 10 MG tablet Take 1 tablet (10 mg total) by mouth 2 (two) times daily. 180 tablet 1    lisinopril 10 MG tablet Take 1 tablet (10  mg total) by mouth once daily. 90 tablet 1    metFORMIN (GLUCOPHAGE) 1000 MG tablet Take 1 tablet (1,000 mg total) by mouth 2 (two) times daily. 180 tablet 1    metoprolol tartrate (LOPRESSOR) 25 MG tablet Take 1 tablet (25 mg total) by mouth 2 (two) times daily. To keep heart rate down 180 tablet 1    nystatin (MYCOSTATIN) cream APPLY TO AFFECTED AREA(S) TWICE A DAY. 60 g 0    pravastatin (PRAVACHOL) 20 MG tablet Take 1 tablet (20 mg total) by mouth nightly. 90 tablet 1    SITagliptin (JANUVIA) 100 MG Tab Take 1 tablet (100 mg total) by mouth once daily. 90 tablet 1    tamsulosin (FLOMAX) 0.4 mg Cap Take 2 capsules (0.8 mg total) by mouth once daily. 180 capsule 1    montelukast (SINGULAIR) 10 mg tablet Take 1 tablet (10 mg total) by mouth every evening. 30 tablet 1     No current facility-administered medications for this visit.      Review of patient's allergies indicates:  No Known Allergies   Past Medical History:   Diagnosis Date    Diabetes mellitus, type 2     Gastroenteritis     Seasonal allergic rhinitis due to pollen     Shingles      Past Surgical History:   Procedure Laterality Date    APPENDECTOMY      EYE SURGERY Left        Review of Systems   Constitutional: Negative for activity change, appetite change, chills, diaphoresis, fatigue, fever, unexpected weight change and weight loss.   HENT: Positive for congestion and postnasal drip. Negative for nosebleeds, rhinorrhea, sore throat and voice change.    Eyes: Negative for blurred vision, pain, discharge and visual disturbance.   Respiratory: Positive for cough. Negative for apnea, shortness of breath and wheezing.    Cardiovascular: Negative for chest pain, palpitations and leg swelling.   Gastrointestinal: Negative for abdominal pain, constipation, diarrhea, nausea and vomiting.   Endocrine: Positive for polyuria. Negative for polydipsia and polyphagia.   Genitourinary: Positive for frequency. Negative for difficulty urinating, dysuria,  "testicular pain and urgency.        Nocturia   Musculoskeletal: Negative for arthralgias, gait problem, myalgias and neck pain.   Skin: Negative for color change, pallor and rash.   Allergic/Immunologic: Negative for immunocompromised state.   Neurological: Negative for dizziness, syncope, weakness, numbness and headaches.   Hematological: Negative for adenopathy. Does not bruise/bleed easily.   Psychiatric/Behavioral: Negative for confusion, dysphoric mood, self-injury, sleep disturbance and suicidal ideas. The patient is not nervous/anxious.       OBJECTIVE:      Vitals:    07/02/19 1445   BP: 100/64   Pulse: 88   SpO2: 97%   Weight: 95.5 kg (210 lb 9.6 oz)   Height: 5' 9" (1.753 m)     No visits with results within 1 Month(s) from this visit.   Latest known visit with results is:   Office Visit on 05/15/2019   Component Date Value Ref Range Status    Glucose 06/12/2019 92  65 - 99 mg/dL Final    BUN, Bld 06/12/2019 17  8 - 27 mg/dL Final    Creatinine 06/12/2019 1.47* 0.76 - 1.27 mg/dL Final    eGFR if non African American 06/12/2019 46* >59 mL/min/1.73 Final    eGFR if African American 06/12/2019 53* >59 mL/min/1.73 Final    BUN/Creatinine Ratio 06/12/2019 12  10 - 24 Final    Sodium 06/12/2019 138  134 - 144 mmol/L Final    Potassium 06/12/2019 4.8  3.5 - 5.2 mmol/L Final    Chloride 06/12/2019 99  96 - 106 mmol/L Final    CO2 06/12/2019 22  20 - 29 mmol/L Final    Calcium 06/12/2019 9.7  8.6 - 10.2 mg/dL Final    Phosphorus 06/12/2019 4.6* 2.5 - 4.5 mg/dL Final    Albumin 06/12/2019 4.5  3.5 - 4.8 g/dL Final   ]  Physical Exam   Constitutional: He is oriented to person, place, and time. He appears well-developed and well-nourished. No distress.   HENT:   Head: Normocephalic and atraumatic.   Right Ear: Tympanic membrane, external ear and ear canal normal.   Left Ear: Tympanic membrane, external ear and ear canal normal.   Nose: Mucosal edema present.   Mouth/Throat: Uvula is midline and oropharynx " is clear and moist. No oropharyngeal exudate, posterior oropharyngeal edema or posterior oropharyngeal erythema.   Post-nasal drip   Eyes: Pupils are equal, round, and reactive to light. Conjunctivae, EOM and lids are normal. Right eye exhibits no discharge. Left eye exhibits no discharge. No scleral icterus.   Neck: Normal range of motion. Neck supple. Carotid bruit is not present. No tracheal deviation present. No thyromegaly present.   Cardiovascular: Normal rate, regular rhythm, normal heart sounds and intact distal pulses. Exam reveals no gallop and no friction rub.   No murmur heard.  Pulmonary/Chest: Effort normal and breath sounds normal. No stridor. No respiratory distress. He has no wheezes. He has no rales.   Abdominal: Soft. Bowel sounds are normal. He exhibits no distension.   Musculoskeletal: Normal range of motion. He exhibits no edema.   Lymphadenopathy:     He has no cervical adenopathy.   Neurological: He is alert and oriented to person, place, and time.   Skin: Skin is warm, dry and intact. Capillary refill takes less than 2 seconds. He is not diaphoretic. No erythema. No pallor.   Psychiatric: He has a normal mood and affect. His behavior is normal. Judgment and thought content normal. He expresses no suicidal plans.      Assessment:       1. Renal insufficiency    2. Hay fever    3. Dyslipidemia    4. Essential hypertension    5. Type 2 diabetes mellitus with stage 3 chronic kidney disease, without long-term current use of insulin    6. Excessive urination at night        Plan:         Renal insufficiency   Improving; continue to hydrate well; monitor    Hay fever   Reports no relief with OTC antihistamines; will try singulair  -     montelukast (SINGULAIR) 10 mg tablet; Take 1 tablet (10 mg total) by mouth every evening.  Dispense: 30 tablet; Refill: 1    Dyslipidemia  -     pravastatin (PRAVACHOL) 20 MG tablet; Take 1 tablet (20 mg total) by mouth nightly.  Dispense: 90 tablet; Refill:  1    Essential hypertension   Stable; continue current medications.  -     metoprolol tartrate (LOPRESSOR) 25 MG tablet; Take 1 tablet (25 mg total) by mouth 2 (two) times daily. To keep heart rate down  Dispense: 180 tablet; Refill: 1  -     lisinopril 10 MG tablet; Take 1 tablet (10 mg total) by mouth once daily.  Dispense: 90 tablet; Refill: 1    Type 2 diabetes mellitus with stage 3 chronic kidney disease, without long-term current use of insulin   Stable; continue current medications.  -     lisinopril 10 MG tablet; Take 1 tablet (10 mg total) by mouth once daily.  Dispense: 90 tablet; Refill: 1  -     glipiZIDE (GLUCOTROL) 10 MG tablet; Take 1 tablet (10 mg total) by mouth 2 (two) times daily.  Dispense: 180 tablet; Refill: 1  -     metFORMIN (GLUCOPHAGE) 1000 MG tablet; Take 1 tablet (1,000 mg total) by mouth 2 (two) times daily.  Dispense: 180 tablet; Refill: 1  -     SITagliptin (JANUVIA) 100 MG Tab; Take 1 tablet (100 mg total) by mouth once daily.  Dispense: 90 tablet; Refill: 1  -     aspirin (ECOTRIN) 81 MG EC tablet; Take 1 tablet (81 mg total) by mouth 2 (two) times daily.  -     Comprehensive metabolic panel; Future; Expected date: 12/16/2019  -     Hemoglobin A1c; Future; Expected date: 12/16/2019  -     Microalbumin/creatinine urine ratio; Future; Expected date: 12/16/2019  -     Lipid panel; Future; Expected date: 12/16/2019    Excessive urination at night   Stable; continue current medications.  -     tamsulosin (FLOMAX) 0.4 mg Cap; Take 2 capsules (0.8 mg total) by mouth once daily.  Dispense: 180 capsule; Refill: 1        Follow up in about 6 months (around 1/2/2020) for diabetes.      7/2/2019 RONNIE Kennedy, KATIEP

## 2019-10-23 ENCOUNTER — TELEPHONE (OUTPATIENT)
Dept: FAMILY MEDICINE | Facility: CLINIC | Age: 74
End: 2019-10-23

## 2019-10-23 DIAGNOSIS — B37.2 YEAST DERMATITIS: ICD-10-CM

## 2019-10-23 RX ORDER — FLUCONAZOLE 150 MG/1
150 TABLET ORAL EVERY OTHER DAY
Qty: 3 TABLET | Refills: 1 | Status: ON HOLD | OUTPATIENT
Start: 2019-10-23 | End: 2020-06-10

## 2019-10-23 RX ORDER — NYSTATIN 100000 U/G
CREAM TOPICAL
Qty: 60 G | Refills: 1 | Status: ON HOLD | OUTPATIENT
Start: 2019-10-23 | End: 2020-06-10

## 2019-10-23 NOTE — TELEPHONE ENCOUNTER
I am sending in the rx.  He needs to watch his sugar intake as yeast will be worse when sugar is high.  Wash feet the selsun blue shampoo as well.

## 2019-12-19 DIAGNOSIS — R35.1 NOCTURIA: ICD-10-CM

## 2019-12-19 RX ORDER — FINASTERIDE 5 MG/1
TABLET, FILM COATED ORAL
Qty: 90 TABLET | Refills: 0 | Status: SHIPPED | OUTPATIENT
Start: 2019-12-19 | End: 2020-03-02

## 2019-12-26 RX ORDER — IPRATROPIUM BROMIDE 42 UG/1
SPRAY, METERED NASAL
COMMUNITY
Start: 2019-11-01 | End: 2020-12-30

## 2020-01-01 LAB
ALBUMIN SERPL-MCNC: 4.4 G/DL (ref 3.5–4.8)
ALBUMIN/CREAT UR: 12.9 MG/G CREAT (ref 0–30)
ALBUMIN/GLOB SERPL: 1.8 {RATIO} (ref 1.2–2.2)
ALP SERPL-CCNC: 66 IU/L (ref 39–117)
ALT SERPL-CCNC: 28 IU/L (ref 0–44)
AST SERPL-CCNC: 26 IU/L (ref 0–40)
BILIRUB SERPL-MCNC: 0.5 MG/DL (ref 0–1.2)
BUN SERPL-MCNC: 18 MG/DL (ref 8–27)
BUN/CREAT SERPL: 13 (ref 10–24)
CALCIUM SERPL-MCNC: 9.5 MG/DL (ref 8.6–10.2)
CHLORIDE SERPL-SCNC: 101 MMOL/L (ref 96–106)
CHOLEST SERPL-MCNC: 161 MG/DL (ref 100–199)
CO2 SERPL-SCNC: 20 MMOL/L (ref 20–29)
CREAT SERPL-MCNC: 1.41 MG/DL (ref 0.76–1.27)
CREAT UR-MCNC: 103.5 MG/DL
GLOBULIN SER CALC-MCNC: 2.4 G/DL (ref 1.5–4.5)
GLUCOSE SERPL-MCNC: 186 MG/DL (ref 65–99)
HBA1C MFR BLD: 7.6 % (ref 4.8–5.6)
HDLC SERPL-MCNC: 42 MG/DL
LDLC SERPL CALC-MCNC: 75 MG/DL (ref 0–99)
MICROALBUMIN UR-MCNC: 13.4 UG/ML
POTASSIUM SERPL-SCNC: 5.3 MMOL/L (ref 3.5–5.2)
PROT SERPL-MCNC: 6.8 G/DL (ref 6–8.5)
SODIUM SERPL-SCNC: 136 MMOL/L (ref 134–144)
TRIGL SERPL-MCNC: 219 MG/DL (ref 0–149)
VLDLC SERPL CALC-MCNC: 44 MG/DL (ref 5–40)

## 2020-01-02 ENCOUNTER — TELEPHONE (OUTPATIENT)
Dept: FAMILY MEDICINE | Facility: CLINIC | Age: 75
End: 2020-01-02

## 2020-01-02 NOTE — PROGRESS NOTES
Labs are stable- Kidney function improved but still elevated, A1C 7.6 (goal is below 7), urine normal and potassium slightly elevated- follow up for further discussion as planned

## 2020-01-02 NOTE — TELEPHONE ENCOUNTER
----- Message from VETO Mcrae sent at 1/2/2020  8:41 AM CST -----  Labs are stable- Kidney function improved but still elevated, A1C 7.6 (goal is below 7), urine normal and potassium slightly elevated- follow up for further discussion as planned

## 2020-01-02 NOTE — TELEPHONE ENCOUNTER
Patient notified and verbalized understanding in regards to test results and to keep appointment to discuss results in further detail.

## 2020-01-06 ENCOUNTER — OFFICE VISIT (OUTPATIENT)
Dept: FAMILY MEDICINE | Facility: CLINIC | Age: 75
End: 2020-01-06
Payer: MEDICARE

## 2020-01-06 VITALS
HEIGHT: 69 IN | OXYGEN SATURATION: 96 % | BODY MASS INDEX: 31.3 KG/M2 | WEIGHT: 211.31 LBS | SYSTOLIC BLOOD PRESSURE: 118 MMHG | DIASTOLIC BLOOD PRESSURE: 78 MMHG | HEART RATE: 94 BPM

## 2020-01-06 DIAGNOSIS — E11.22 TYPE 2 DIABETES MELLITUS WITH STAGE 3 CHRONIC KIDNEY DISEASE, WITHOUT LONG-TERM CURRENT USE OF INSULIN: Primary | ICD-10-CM

## 2020-01-06 DIAGNOSIS — N18.30 TYPE 2 DIABETES MELLITUS WITH STAGE 3 CHRONIC KIDNEY DISEASE, WITHOUT LONG-TERM CURRENT USE OF INSULIN: Primary | ICD-10-CM

## 2020-01-06 DIAGNOSIS — Z12.5 SCREENING FOR MALIGNANT NEOPLASM OF PROSTATE: ICD-10-CM

## 2020-01-06 DIAGNOSIS — E78.5 DYSLIPIDEMIA: ICD-10-CM

## 2020-01-06 DIAGNOSIS — I10 ESSENTIAL HYPERTENSION: ICD-10-CM

## 2020-01-06 PROCEDURE — 1126F PR PAIN SEVERITY QUANTIFIED, NO PAIN PRESENT: ICD-10-PCS | Mod: ,,, | Performed by: NURSE PRACTITIONER

## 2020-01-06 PROCEDURE — 1159F PR MEDICATION LIST DOCUMENTED IN MEDICAL RECORD: ICD-10-PCS | Mod: ,,, | Performed by: NURSE PRACTITIONER

## 2020-01-06 PROCEDURE — 1159F MED LIST DOCD IN RCRD: CPT | Mod: ,,, | Performed by: NURSE PRACTITIONER

## 2020-01-06 PROCEDURE — 99214 PR OFFICE/OUTPT VISIT, EST, LEVL IV, 30-39 MIN: ICD-10-PCS | Mod: S$PBB,,, | Performed by: NURSE PRACTITIONER

## 2020-01-06 PROCEDURE — 1170F FXNL STATUS ASSESSED: CPT | Mod: ,,, | Performed by: NURSE PRACTITIONER

## 2020-01-06 PROCEDURE — 99214 OFFICE O/P EST MOD 30 MIN: CPT | Mod: S$PBB,,, | Performed by: NURSE PRACTITIONER

## 2020-01-06 PROCEDURE — 1126F AMNT PAIN NOTED NONE PRSNT: CPT | Mod: ,,, | Performed by: NURSE PRACTITIONER

## 2020-01-06 PROCEDURE — 99214 OFFICE O/P EST MOD 30 MIN: CPT | Performed by: NURSE PRACTITIONER

## 2020-01-06 PROCEDURE — 1170F PR FUNCTIONAL STATUS ASSESSED: ICD-10-PCS | Mod: ,,, | Performed by: NURSE PRACTITIONER

## 2020-01-06 NOTE — PATIENT INSTRUCTIONS
Long-Term Complications of Diabetes    Diabetes can cause health problems over time. These are called complications. They are more likely to happen if your blood sugar is often too high. Over time, high blood sugar can damage blood vessels in your body. It is important to keep your blood sugar in your target range. This can help prevent or delay complications from diabetes.  Possible complications  Complications of diabetes include:  · Eye problems, including damage to the blood vessels in the eyes (retinopathy), pressure in the eye (glaucoma), and clouding of the eyes lens (a cataract). Eye problems can eventually lead to irreversible blindness.   · Tooth and gum problems (periodontal disease), causing loss of teeth and bone  · Blood vessel (vascular) disease leading to circulation problems, heart attack or stroke, or a need for amputation of a limb   · Problems with sexual function leading to erectile dysfunction in men and sexual discomfort in women   · Kidney disease (nephropathy) can eventually lead to kidney failure, which may require dialysis or kidney transplant   · Nerve problems (neuropathy), causing pain or loss of feeling in your feet and other parts of your body, potentially leading to an amputation of a limb   · High blood pressure (hypertension), putting strain on your heart and blood vessels  · Serious infections, possibly leading to loss of toes, feet, or limbs  How to avoid complications  The serious consequences of these complications may be avoidable for most people with diabetes by managing your blood glucose, blood pressure, and cholesterol levels. This can help you feel better and stay healthy. You can manage diabetes by tracking your blood sugar. You can also eat healthy and exercise to avoid gaining weight. And you should take medicine if directed by your healthcare provider.  Date Last Reviewed: 5/1/2016  © 9659-3146 [employer]. [address] [place] [address], [place] [address].  All rights reserved. This information is not intended as a substitute for professional medical care. Always follow your healthcare professional's instructions.

## 2020-01-06 NOTE — PROGRESS NOTES
SUBJECTIVE:      Patient ID: Theo Chen Jr. is a 74 y.o. male.    Chief Complaint: Diabetes (6 month f/u)    Mr. Chen is here today for follow up for diabetes.  He states he has not really been watching his diet and he has not been exercising.  He is taking his medication as directed and has been trying to stay hydrated.  He states he will not have a dilated eye exam done due to problems with having his eye dilated in the past.    Diabetes   He presents for his follow-up diabetic visit. He has type 2 diabetes mellitus. His disease course has been stable. Hypoglycemia symptoms include hunger, mood changes and sweats. Pertinent negatives for hypoglycemia include no confusion, dizziness, headaches, nervousness/anxiousness or pallor. Associated symptoms include polyuria. Pertinent negatives for diabetes include no blurred vision, no chest pain, no fatigue, no foot paresthesias, no foot ulcerations, no polydipsia, no polyphagia, no visual change and no weakness. Symptoms are stable. Diabetic complications include nephropathy. Risk factors for coronary artery disease include dyslipidemia, family history, hypertension, male sex, diabetes mellitus and sedentary lifestyle. Current diabetic treatment includes oral agent (triple therapy) and diet. He is compliant with treatment most of the time. His weight is stable. He is following a generally unhealthy diet. When asked about meal planning, he reported none. He participates in exercise intermittently. An ACE inhibitor/angiotensin II receptor blocker is being taken. He does not see a podiatrist.Eye exam is not current.       Past Surgical History:   Procedure Laterality Date    APPENDECTOMY      EYE SURGERY Left      Family History   Problem Relation Age of Onset    Hypertension Mother     Heart disease Father       Social History     Socioeconomic History    Marital status:      Spouse name: Not on file    Number of children: Not on file    Years of  education: Not on file    Highest education level: Not on file   Occupational History    Occupation: retired    Social Needs    Financial resource strain: Not on file    Food insecurity:     Worry: Not on file     Inability: Not on file    Transportation needs:     Medical: Not on file     Non-medical: Not on file   Tobacco Use    Smoking status: Former Smoker    Smokeless tobacco: Never Used   Substance and Sexual Activity    Alcohol use: No    Drug use: No    Sexual activity: Not on file   Lifestyle    Physical activity:     Days per week: Not on file     Minutes per session: Not on file    Stress: Not on file   Relationships    Social connections:     Talks on phone: Not on file     Gets together: Not on file     Attends Yazidism service: Not on file     Active member of club or organization: Not on file     Attends meetings of clubs or organizations: Not on file     Relationship status: Not on file   Other Topics Concern    Not on file   Social History Narrative    Not on file     Current Outpatient Medications   Medication Sig Dispense Refill    albuterol (PROVENTIL/VENTOLIN HFA) 90 mcg/actuation inhaler Inhale 2 puffs into the lungs every 6 (six) hours as needed for Wheezing. 1 Inhaler 2    aspirin (ECOTRIN) 81 MG EC tablet Take 1 tablet (81 mg total) by mouth 2 (two) times daily.      doxazosin (CARDURA) 2 MG tablet TAKE ONE TABLET BY MOUTH EVERY EVENING. 90 tablet 1    finasteride (PROSCAR) 5 mg tablet TAKE 1 TABLET BY MOUTH ONCE DAILY AT BEDTIME 90 tablet 0    fluconazole (DIFLUCAN) 150 MG Tab Take 1 tablet (150 mg total) by mouth every other day. 3 tablet 1    fluticasone propionate (FLONASE) 50 mcg/actuation nasal spray 2 sprays (100 mcg total) by Each Nare route once daily. 1 Bottle 3    glipiZIDE (GLUCOTROL) 10 MG tablet Take 1 tablet (10 mg total) by mouth 2 (two) times daily. 180 tablet 1    ipratropium (ATROVENT) 42 mcg (0.06 %) nasal spray       lisinopril 10 MG tablet Take  1 tablet (10 mg total) by mouth once daily. 90 tablet 1    metFORMIN (GLUCOPHAGE) 1000 MG tablet Take 1 tablet (1,000 mg total) by mouth 2 (two) times daily. 180 tablet 1    metoprolol tartrate (LOPRESSOR) 25 MG tablet Take 1 tablet (25 mg total) by mouth 2 (two) times daily. To keep heart rate down 180 tablet 1    nystatin (MYCOSTATIN) cream APPLY TO AFFECTED AREA(S) TWICE A DAY. 60 g 1    pravastatin (PRAVACHOL) 20 MG tablet Take 1 tablet (20 mg total) by mouth nightly. 90 tablet 1    SITagliptin (JANUVIA) 100 MG Tab Take 1 tablet (100 mg total) by mouth once daily. 90 tablet 1    tamsulosin (FLOMAX) 0.4 mg Cap Take 2 capsules (0.8 mg total) by mouth once daily. 180 capsule 1     No current facility-administered medications for this visit.      Review of patient's allergies indicates:  No Known Allergies   Past Medical History:   Diagnosis Date    Diabetes mellitus, type 2     Gastroenteritis     Seasonal allergic rhinitis due to pollen     Shingles      Past Surgical History:   Procedure Laterality Date    APPENDECTOMY      EYE SURGERY Left        Review of Systems   Constitutional: Negative for activity change, appetite change, chills, diaphoresis, fatigue, fever and unexpected weight change.   HENT: Negative for congestion, nosebleeds, postnasal drip, rhinorrhea, sore throat and voice change.    Eyes: Negative for blurred vision, pain, discharge and visual disturbance.   Respiratory: Negative for apnea, cough, shortness of breath and wheezing.    Cardiovascular: Negative for chest pain, palpitations and leg swelling.   Gastrointestinal: Negative for abdominal pain, constipation, diarrhea, nausea and vomiting.   Endocrine: Positive for polyuria. Negative for polydipsia and polyphagia.   Genitourinary: Negative for difficulty urinating, dysuria, frequency, testicular pain and urgency.   Musculoskeletal: Positive for arthralgias and back pain. Negative for gait problem, myalgias and neck pain.   Skin:  "Negative for color change, pallor and rash.   Allergic/Immunologic: Negative for immunocompromised state.   Neurological: Negative for dizziness, syncope, weakness, numbness and headaches.   Hematological: Negative for adenopathy. Does not bruise/bleed easily.   Psychiatric/Behavioral: Negative for confusion, dysphoric mood, self-injury, sleep disturbance and suicidal ideas. The patient is not nervous/anxious.       OBJECTIVE:      Vitals:    01/06/20 0747   BP: 118/78   Pulse: 94   SpO2: 96%   Weight: 95.8 kg (211 lb 4.8 oz)   Height: 5' 9" (1.753 m)     Physical Exam   Constitutional: He is oriented to person, place, and time. He appears well-developed and well-nourished. No distress.   HENT:   Head: Normocephalic and atraumatic.   Right Ear: Tympanic membrane, external ear and ear canal normal.   Left Ear: Tympanic membrane, external ear and ear canal normal.   Nose: Nose normal.   Mouth/Throat: Uvula is midline and oropharynx is clear and moist. No oropharyngeal exudate, posterior oropharyngeal edema or posterior oropharyngeal erythema.   Eyes: Conjunctivae and lids are normal. Right eye exhibits no discharge. Left eye exhibits no discharge. No scleral icterus. Right eye exhibits normal extraocular motion and no nystagmus.   Neck: Normal range of motion. Neck supple. Carotid bruit is not present. No tracheal deviation present. No thyromegaly present.   Cardiovascular: Normal rate, regular rhythm, normal heart sounds and intact distal pulses. Exam reveals no gallop and no friction rub.   No murmur heard.  Pulses:       Dorsalis pedis pulses are 2+ on the right side, and 2+ on the left side.        Posterior tibial pulses are 2+ on the right side, and 2+ on the left side.   Pulmonary/Chest: Effort normal and breath sounds normal. No stridor. No respiratory distress. He has no wheezes. He has no rales.   Abdominal: Soft. Bowel sounds are normal. He exhibits no distension and no mass. There is no hepatosplenomegaly. " There is no tenderness. There is no rigidity, no rebound, no guarding and no CVA tenderness.   Musculoskeletal: Normal range of motion. He exhibits no edema.        Right foot: There is no deformity.        Left foot: There is no deformity.   Feet:   Right Foot:   Protective Sensation: 10 sites tested. 10 sites sensed.   Skin Integrity: Negative for ulcer, skin breakdown or warmth.   Left Foot:   Protective Sensation: 10 sites tested. 10 sites sensed.   Skin Integrity: Negative for ulcer, skin breakdown or warmth.   Lymphadenopathy:     He has no cervical adenopathy.   Neurological: He is alert and oriented to person, place, and time.   Skin: Skin is warm, dry and intact. Capillary refill takes less than 2 seconds. He is not diaphoretic. No erythema. No pallor.   Psychiatric: He has a normal mood and affect. His behavior is normal. Judgment and thought content normal. He expresses no suicidal plans.      Assessment:       1. Type 2 diabetes mellitus with stage 3 chronic kidney disease, without long-term current use of insulin    2. Dyslipidemia    3. Essential hypertension    4. Screening for malignant neoplasm of prostate        Plan:       Type 2 diabetes mellitus with stage 3 chronic kidney disease, without long-term current use of insulin   Stable; continue current medications.  Hydrate well; DIET and exercise discussed  -     Lipid panel; Future; Expected date: 07/01/2020  -     Hemoglobin A1c; Future; Expected date: 07/01/2020  -     Foot Exam Performed    Dyslipidemia   Stable; continue current medications.  -     Comprehensive metabolic panel; Future; Expected date: 07/01/2020    Essential hypertension   Stable; continue current medications.  -     CBC auto differential; Future; Expected date: 07/01/2020    Screening for malignant neoplasm of prostate  -     PSA, Screening; Future; Expected date: 07/01/2020        Follow up in about 6 months (around 7/6/2020), or if symptoms worsen or fail to improve, for  diabetes.      1/6/2020 Juanita Delgado, RONNIE, FNP

## 2020-02-18 ENCOUNTER — TELEPHONE (OUTPATIENT)
Dept: FAMILY MEDICINE | Facility: CLINIC | Age: 75
End: 2020-02-18

## 2020-02-18 NOTE — TELEPHONE ENCOUNTER
----- Message from Emily Mejia sent at 2/17/2020 10:47 AM CST -----  LOUANN EXACT AL[facility] SWAIN, 02/14/20

## 2020-03-02 DIAGNOSIS — R35.1 NOCTURIA: ICD-10-CM

## 2020-03-02 RX ORDER — FINASTERIDE 5 MG/1
TABLET, FILM COATED ORAL
Qty: 90 TABLET | Refills: 0 | Status: SHIPPED | OUTPATIENT
Start: 2020-03-02 | End: 2020-03-17 | Stop reason: SDUPTHER

## 2020-03-17 DIAGNOSIS — R35.1 NOCTURIA: ICD-10-CM

## 2020-03-17 DIAGNOSIS — N28.9 RENAL INSUFFICIENCY: Primary | ICD-10-CM

## 2020-03-20 RX ORDER — FINASTERIDE 5 MG/1
5 TABLET, FILM COATED ORAL DAILY
Qty: 90 TABLET | Refills: 1 | Status: SHIPPED | OUTPATIENT
Start: 2020-03-20 | End: 2020-11-19 | Stop reason: SDUPTHER

## 2020-03-20 NOTE — TELEPHONE ENCOUNTER
He should only be taking the Proscar once a day.  The max recommended dose is 5 mg a day.  These medications are supposed to help make it easier to urinate.  They do not pull of fluid.  If he feels like he is full of fluid, he may need a mild diuretic, not more prostate medication.   No

## 2020-05-28 ENCOUNTER — OFFICE VISIT (OUTPATIENT)
Dept: UROLOGY | Facility: CLINIC | Age: 75
End: 2020-05-28
Payer: MEDICARE

## 2020-05-28 VITALS
RESPIRATION RATE: 18 BRPM | HEART RATE: 83 BPM | HEIGHT: 69 IN | TEMPERATURE: 98 F | BODY MASS INDEX: 31.68 KG/M2 | DIASTOLIC BLOOD PRESSURE: 71 MMHG | SYSTOLIC BLOOD PRESSURE: 116 MMHG | WEIGHT: 213.88 LBS

## 2020-05-28 DIAGNOSIS — R39.9 LOWER URINARY TRACT SYMPTOMS (LUTS): Primary | ICD-10-CM

## 2020-05-28 PROCEDURE — 99999 PR PBB SHADOW E&M-EST. PATIENT-LVL IV: CPT | Mod: PBBFAC,,, | Performed by: UROLOGY

## 2020-05-28 PROCEDURE — 99999 PR PBB SHADOW E&M-EST. PATIENT-LVL IV: ICD-10-PCS | Mod: PBBFAC,,, | Performed by: UROLOGY

## 2020-05-28 PROCEDURE — 99204 PR OFFICE/OUTPT VISIT, NEW, LEVL IV, 45-59 MIN: ICD-10-PCS | Mod: S$PBB,,, | Performed by: UROLOGY

## 2020-05-28 PROCEDURE — 99204 OFFICE O/P NEW MOD 45 MIN: CPT | Mod: S$PBB,,, | Performed by: UROLOGY

## 2020-05-28 PROCEDURE — 99214 OFFICE O/P EST MOD 30 MIN: CPT | Mod: PBBFAC,PN | Performed by: UROLOGY

## 2020-05-28 NOTE — PATIENT INSTRUCTIONS
Cystoscopy    Cystoscopy is a procedure that lets your doctor look directly inside your urethra and bladder. It can be used to:  · Help diagnose a problem with your urethra, bladder, or kidneys.  · Take a sample (biopsy) of bladder or urethral tissue.  · Treat certain problems (such as removing kidney stones).  · Place a stent to bypass an obstruction.  · Take special X-rays of the kidneys.  Based on the findings, your doctor may recommend other tests or treatments.  What is a cystoscope?  A cystoscope is a telescope-like instrument that contains lenses and fiberoptics (small glass wires that make bright light). The cystoscope may be straight and rigid, or flexible to bend around curves in the urethra. The doctor may look directly into the cystoscope, or project the image onto a monitor.  Getting ready  · Ask your doctor if you should stop taking any medicines before the procedure.  · Ask whether you should avoid eating or drinking anything after midnight before the procedure.  · Follow any other instructions your doctor gives you.  Tell your doctor before the exam if you:  · Take any medicines, such as aspirin or blood thinners  · Have allergies to any medicines  · Are pregnant   The procedure  Cystoscopy is done in the doctors office, surgery center, or hospital. The doctor and a nurse are present during the procedure. It takes only a few minutes, longer if a biopsy, X-ray, or treatment needs to be done.  During the procedure:  · You lie on an exam table on your back, knees bent and legs apart. You are covered with a drape.  · Your urethra and the area around it are washed. Anesthetic jelly may be applied to numb the urethra. Other pain medicine is usually not needed. In some cases, you may be offered a mild sedative to help you relax. If a more extensive procedure is to be done, such as a biopsy or kidney stone removal, general anesthesia may be needed.  · The cystoscope is inserted. A sterile fluid is put  into the bladder to expand it. You may feel pressure from this fluid.  · When the procedure is done, the cystoscope is removed.  After the procedure  If you had a sedative, general anesthesia, or spinal anesthesia, you must have someone drive you home. Once youre home:  · Drink plenty of fluids.  · You may have burning or light bleeding when you urinate--this is normal.  · Medicines may be prescribed to ease any discomfort or prevent infection. Take these as directed.  · Call your doctor if you have heavy bleeding or blood clots, burning that lasts more than a day, a fever over 100°F  (38° C), or trouble urinating.  Date Last Reviewed: 1/1/2017  © 3513-4510 [employer] [address] [place] [address], [place] [address]. All rights reserved. This information is not intended as a substitute for professional medical care. Always follow your healthcare professional's instructions.

## 2020-05-28 NOTE — PROGRESS NOTES
[facility] New Patient/H&P:    Theo Chen Jr. is a 74 y.o. male who presents for lower urinary tract symptoms.    Patient complains of nocturia x 4, frequency and incomplete emptying for approximately 1 year refractory to Flomax 0.8 MG PO daily and Finasteride 5 mg.     He is extremely bothered by his symptoms and does not notice any relief with medications.     Patient denies any fever, chills, dysuria, urinary tract infection, recent  trauma or history of  malignancy.       PSA  0.7  5/7/19        Past Medical History:   Diagnosis Date    Diabetes mellitus, type 2     Gastroenteritis     Seasonal allergic rhinitis due to pollen     Shingles        Past Surgical History:   Procedure Laterality Date    APPENDECTOMY      EYE SURGERY Left        Family History   Problem Relation Age of Onset    Hypertension Mother     Heart disease Father        Social History     Socioeconomic History    Marital status:      Spouse name: Not on file    Number of children: Not on file    Years of education: Not on file    Highest education level: Not on file   Occupational History    Occupation: retired    Social Needs    Financial resource strain: Not on file    Food insecurity:     Worry: Not on file     Inability: Not on file    Transportation needs:     Medical: Not on file     Non-medical: Not on file   Tobacco Use    Smoking status: Former Smoker    Smokeless tobacco: Never Used   Substance and Sexual Activity    Alcohol use: No    Drug use: No    Sexual activity: Not on file   Lifestyle    Physical activity:     Days per week: Not on file     Minutes per session: Not on file    Stress: Not on file   Relationships    Social connections:     Talks on phone: Not on file     Gets together: Not on file     Attends Taoist service: Not on file     Active member of club or organization: Not on file     Attends meetings of clubs or organizations: Not on file      "Relationship status: Not on file   Other Topics Concern    Not on file   Social History Narrative    Not on file       Review of patient's allergies indicates:  No Known Allergies    Medications Reviewed: see MAR    ROS:    Constitutional: denies fevers, chills, night sweats, fatigue, malaise  Respiratory: negative for cough, shortness of breath, wheezing, dyspnea.  Cardiovascular: negative for chest pain, varicose veins, ankle swelling, palpitations, syncope.  GI: negative for abdominal pain, heartburn, indigestion, nausea, vomiting, constipation, diarrhea, blood in stool.   Urology: as noted above in HPI  Endocrinology: negative for cold intolerance, excessive thirst, not feeling tired/sluggish, no heat intolerance.   Hematology/Lymph: negative for easy bleeding, easy bruising, swollen glands.  Musculoskeletal: negative for back pain, joint pain, joint swelling, neck pain.  Allergy-Immunology: negative for seasonal allergies, negative for unusual infections.   Skin: negative for boils, breast lumps, hives, itching, rash.   Neurology: negative for, dizziness, headache, tingling/numbness, tremors.   Psych: satisfied with life; negative for, anxiety, depression, suicidal thoughts.     PHYSICAL EXAM:    Vitals:    05/28/20 1342   BP: 116/71   Pulse: 83   Resp: 18   Temp: 97.9 °F (36.6 °C)     Body mass index is 31.58 kg/m². Weight: 97 kg (213 lb 13.5 oz) Height: 5' 9" (175.3 cm)       General: Alert, cooperative, no distress, appears stated age  Head: Normocephalic, without obvious abnormality, atraumatic  Neck: no masses, no thyromegaly, no lymphadenopathy  Eyes: PERRL, conjunctiva/corneas clear  Lungs: Respirations unlabored, normal effort, no accessory muscle use  CV: Warm and well perfused extremities  Abdomen: Soft, non-tender, no CVA tenderness, no hepatosplenomegaly, no hernia  Extremities: Extremities normal, atraumatic, no cyanosis or edema  Skin: Normal color, texture, and turgor, no rashes or " lesions  Psych: Appropriate, well oriented, normal affect, normal mood  Neuro: Non-focal        LABS:    No results found for this or any previous visit (from the past 336 hour(s)).      IMAGING:    No urologic studies      Assessment/Diagnosis:    1. Lower urinary tract symptoms (LUTS)  COVID-19 Routine Screening    Case Request Operating Room: CYSTOSCOPY, ULTRASOUND, RECTAL APPROACH    Place in Outpatient    Reason for no VTE Prophylaxis       Plans:    - I spent 45 minutes with the patient; more than 50% was in counseling about the disease process and methods of treatment. Extensive discussion with patient regarding the etiology and management of his lower urinary tract symptoms. Explained that LUTS are multifactorial and can be secondary to an enlarged prostate, PO intake of bladder irritants, overactive bladder, constipation, malignancy, trauma, infection, stones or medications. We discussed that there is a potential benefit to further evaluation with cystoscopy and TRUS.  - Scheduled for cystoscopy and TRUS on 6/10/20. Needs COVID test prior.

## 2020-05-28 NOTE — H&P (VIEW-ONLY)
[facility] New Patient/H&P:    Theo Chen Jr. is a 74 y.o. male who presents for lower urinary tract symptoms.    Patient complains of nocturia x 4, frequency and incomplete emptying for approximately 1 year refractory to Flomax 0.8 MG PO daily and Finasteride 5 mg.     He is extremely bothered by his symptoms and does not notice any relief with medications.     Patient denies any fever, chills, dysuria, urinary tract infection, recent  trauma or history of  malignancy.       PSA  0.7  5/7/19        Past Medical History:   Diagnosis Date    Diabetes mellitus, type 2     Gastroenteritis     Seasonal allergic rhinitis due to pollen     Shingles        Past Surgical History:   Procedure Laterality Date    APPENDECTOMY      EYE SURGERY Left        Family History   Problem Relation Age of Onset    Hypertension Mother     Heart disease Father        Social History     Socioeconomic History    Marital status:      Spouse name: Not on file    Number of children: Not on file    Years of education: Not on file    Highest education level: Not on file   Occupational History    Occupation: retired    Social Needs    Financial resource strain: Not on file    Food insecurity:     Worry: Not on file     Inability: Not on file    Transportation needs:     Medical: Not on file     Non-medical: Not on file   Tobacco Use    Smoking status: Former Smoker    Smokeless tobacco: Never Used   Substance and Sexual Activity    Alcohol use: No    Drug use: No    Sexual activity: Not on file   Lifestyle    Physical activity:     Days per week: Not on file     Minutes per session: Not on file    Stress: Not on file   Relationships    Social connections:     Talks on phone: Not on file     Gets together: Not on file     Attends Christianity service: Not on file     Active member of club or organization: Not on file     Attends meetings of clubs or organizations: Not on file      "Relationship status: Not on file   Other Topics Concern    Not on file   Social History Narrative    Not on file       Review of patient's allergies indicates:  No Known Allergies    Medications Reviewed: see MAR    ROS:    Constitutional: denies fevers, chills, night sweats, fatigue, malaise  Respiratory: negative for cough, shortness of breath, wheezing, dyspnea.  Cardiovascular: negative for chest pain, varicose veins, ankle swelling, palpitations, syncope.  GI: negative for abdominal pain, heartburn, indigestion, nausea, vomiting, constipation, diarrhea, blood in stool.   Urology: as noted above in HPI  Endocrinology: negative for cold intolerance, excessive thirst, not feeling tired/sluggish, no heat intolerance.   Hematology/Lymph: negative for easy bleeding, easy bruising, swollen glands.  Musculoskeletal: negative for back pain, joint pain, joint swelling, neck pain.  Allergy-Immunology: negative for seasonal allergies, negative for unusual infections.   Skin: negative for boils, breast lumps, hives, itching, rash.   Neurology: negative for, dizziness, headache, tingling/numbness, tremors.   Psych: satisfied with life; negative for, anxiety, depression, suicidal thoughts.     PHYSICAL EXAM:    Vitals:    05/28/20 1342   BP: 116/71   Pulse: 83   Resp: 18   Temp: 97.9 °F (36.6 °C)     Body mass index is 31.58 kg/m². Weight: 97 kg (213 lb 13.5 oz) Height: 5' 9" (175.3 cm)       General: Alert, cooperative, no distress, appears stated age  Head: Normocephalic, without obvious abnormality, atraumatic  Neck: no masses, no thyromegaly, no lymphadenopathy  Eyes: PERRL, conjunctiva/corneas clear  Lungs: Respirations unlabored, normal effort, no accessory muscle use  CV: Warm and well perfused extremities  Abdomen: Soft, non-tender, no CVA tenderness, no hepatosplenomegaly, no hernia  Extremities: Extremities normal, atraumatic, no cyanosis or edema  Skin: Normal color, texture, and turgor, no rashes or " lesions  Psych: Appropriate, well oriented, normal affect, normal mood  Neuro: Non-focal        LABS:    No results found for this or any previous visit (from the past 336 hour(s)).      IMAGING:    No urologic studies      Assessment/Diagnosis:    1. Lower urinary tract symptoms (LUTS)  COVID-19 Routine Screening    Case Request Operating Room: CYSTOSCOPY, ULTRASOUND, RECTAL APPROACH    Place in Outpatient    Reason for no VTE Prophylaxis       Plans:    - I spent 45 minutes with the patient; more than 50% was in counseling about the disease process and methods of treatment. Extensive discussion with patient regarding the etiology and management of his lower urinary tract symptoms. Explained that LUTS are multifactorial and can be secondary to an enlarged prostate, PO intake of bladder irritants, overactive bladder, constipation, malignancy, trauma, infection, stones or medications. We discussed that there is a potential benefit to further evaluation with cystoscopy and TRUS.  - Scheduled for cystoscopy and TRUS on 6/10/20. Needs COVID test prior.

## 2020-05-30 DIAGNOSIS — E78.5 DYSLIPIDEMIA: ICD-10-CM

## 2020-05-30 DIAGNOSIS — I10 ESSENTIAL HYPERTENSION: ICD-10-CM

## 2020-05-30 DIAGNOSIS — E11.22 TYPE 2 DIABETES MELLITUS WITH STAGE 3 CHRONIC KIDNEY DISEASE, WITHOUT LONG-TERM CURRENT USE OF INSULIN: ICD-10-CM

## 2020-05-30 DIAGNOSIS — N18.30 TYPE 2 DIABETES MELLITUS WITH STAGE 3 CHRONIC KIDNEY DISEASE, WITHOUT LONG-TERM CURRENT USE OF INSULIN: ICD-10-CM

## 2020-05-30 DIAGNOSIS — R35.1 EXCESSIVE URINATION AT NIGHT: ICD-10-CM

## 2020-06-01 DIAGNOSIS — R35.1 NOCTURIA: ICD-10-CM

## 2020-06-01 DIAGNOSIS — E11.22 TYPE 2 DIABETES MELLITUS WITH STAGE 3 CHRONIC KIDNEY DISEASE, WITHOUT LONG-TERM CURRENT USE OF INSULIN: ICD-10-CM

## 2020-06-01 DIAGNOSIS — R05.9 COUGH: ICD-10-CM

## 2020-06-01 DIAGNOSIS — J30.1 HAY FEVER: ICD-10-CM

## 2020-06-01 DIAGNOSIS — R35.1 EXCESSIVE URINATION AT NIGHT: ICD-10-CM

## 2020-06-01 DIAGNOSIS — I10 ESSENTIAL HYPERTENSION: ICD-10-CM

## 2020-06-01 DIAGNOSIS — B37.2 YEAST DERMATITIS: ICD-10-CM

## 2020-06-01 DIAGNOSIS — N18.30 TYPE 2 DIABETES MELLITUS WITH STAGE 3 CHRONIC KIDNEY DISEASE, WITHOUT LONG-TERM CURRENT USE OF INSULIN: ICD-10-CM

## 2020-06-01 DIAGNOSIS — E78.5 DYSLIPIDEMIA: ICD-10-CM

## 2020-06-01 RX ORDER — PRAVASTATIN SODIUM 20 MG/1
20 TABLET ORAL NIGHTLY
Qty: 90 TABLET | Refills: 3 | Status: SHIPPED | OUTPATIENT
Start: 2020-06-01 | End: 2020-06-01 | Stop reason: SDUPTHER

## 2020-06-01 RX ORDER — PRAVASTATIN SODIUM 20 MG/1
TABLET ORAL
Qty: 90 TABLET | Refills: 0 | Status: SHIPPED | OUTPATIENT
Start: 2020-06-01 | End: 2020-06-29 | Stop reason: SDUPTHER

## 2020-06-01 RX ORDER — FLUTICASONE PROPIONATE 50 MCG
2 SPRAY, SUSPENSION (ML) NASAL DAILY
Qty: 18 G | Refills: 3 | Status: SHIPPED | OUTPATIENT
Start: 2020-06-01 | End: 2021-06-30

## 2020-06-01 RX ORDER — ALBUTEROL SULFATE 90 UG/1
2 AEROSOL, METERED RESPIRATORY (INHALATION) EVERY 6 HOURS PRN
Qty: 18 G | Refills: 3 | Status: SHIPPED | OUTPATIENT
Start: 2020-06-01 | End: 2020-09-04

## 2020-06-01 RX ORDER — GLIPIZIDE 10 MG/1
10 TABLET ORAL 2 TIMES DAILY
Qty: 180 TABLET | Refills: 3 | Status: SHIPPED | OUTPATIENT
Start: 2020-06-01 | End: 2020-06-01 | Stop reason: SDUPTHER

## 2020-06-01 RX ORDER — TAMSULOSIN HYDROCHLORIDE 0.4 MG/1
CAPSULE ORAL
Qty: 180 CAPSULE | Refills: 0 | Status: SHIPPED | OUTPATIENT
Start: 2020-06-01 | End: 2020-12-30 | Stop reason: SDUPTHER

## 2020-06-01 RX ORDER — TAMSULOSIN HYDROCHLORIDE 0.4 MG/1
0.8 CAPSULE ORAL DAILY
Qty: 180 CAPSULE | Refills: 3 | Status: SHIPPED | OUTPATIENT
Start: 2020-06-01 | End: 2020-06-01 | Stop reason: SDUPTHER

## 2020-06-01 RX ORDER — LISINOPRIL 10 MG/1
TABLET ORAL
Qty: 90 TABLET | Refills: 0 | Status: SHIPPED | OUTPATIENT
Start: 2020-06-01 | End: 2020-06-29 | Stop reason: SDUPTHER

## 2020-06-01 RX ORDER — METOPROLOL TARTRATE 25 MG/1
TABLET, FILM COATED ORAL
Qty: 180 TABLET | Refills: 0 | Status: SHIPPED | OUTPATIENT
Start: 2020-06-01 | End: 2020-06-29 | Stop reason: SDUPTHER

## 2020-06-01 RX ORDER — METFORMIN HYDROCHLORIDE 1000 MG/1
1000 TABLET ORAL 2 TIMES DAILY
Qty: 180 TABLET | Refills: 3 | Status: SHIPPED | OUTPATIENT
Start: 2020-06-01 | End: 2020-06-01 | Stop reason: SDUPTHER

## 2020-06-01 RX ORDER — METFORMIN HYDROCHLORIDE 1000 MG/1
TABLET ORAL
Qty: 180 TABLET | Refills: 0 | Status: SHIPPED | OUTPATIENT
Start: 2020-06-01 | End: 2020-06-29 | Stop reason: SDUPTHER

## 2020-06-01 RX ORDER — GLIPIZIDE 10 MG/1
TABLET ORAL
Qty: 180 TABLET | Refills: 0 | Status: SHIPPED | OUTPATIENT
Start: 2020-06-01 | End: 2020-06-29 | Stop reason: SDUPTHER

## 2020-06-01 RX ORDER — METOPROLOL TARTRATE 25 MG/1
25 TABLET, FILM COATED ORAL 2 TIMES DAILY
Qty: 180 TABLET | Refills: 3 | Status: SHIPPED | OUTPATIENT
Start: 2020-06-01 | End: 2020-06-01 | Stop reason: SDUPTHER

## 2020-06-01 RX ORDER — IPRATROPIUM BROMIDE 42 UG/1
1 SPRAY, METERED NASAL 2 TIMES DAILY
Qty: 18 ML | Refills: 3 | Status: CANCELLED | OUTPATIENT
Start: 2020-06-01

## 2020-06-03 LAB
ALBUMIN SERPL-MCNC: 4.6 G/DL (ref 3.7–4.7)
ALBUMIN/GLOB SERPL: 1.9 {RATIO} (ref 1.2–2.2)
ALP SERPL-CCNC: 67 IU/L (ref 39–117)
ALT SERPL-CCNC: 27 IU/L (ref 0–44)
AST SERPL-CCNC: 23 IU/L (ref 0–40)
BASOPHILS # BLD AUTO: 0.1 X10E3/UL (ref 0–0.2)
BASOPHILS NFR BLD AUTO: 1 %
BILIRUB SERPL-MCNC: 0.5 MG/DL (ref 0–1.2)
BUN SERPL-MCNC: 19 MG/DL (ref 8–27)
BUN/CREAT SERPL: 12 (ref 10–24)
CALCIUM SERPL-MCNC: 9.6 MG/DL (ref 8.6–10.2)
CHLORIDE SERPL-SCNC: 97 MMOL/L (ref 96–106)
CHOLEST SERPL-MCNC: 153 MG/DL (ref 100–199)
CO2 SERPL-SCNC: 22 MMOL/L (ref 20–29)
CREAT SERPL-MCNC: 1.53 MG/DL (ref 0.76–1.27)
EOSINOPHIL # BLD AUTO: 0.9 X10E3/UL (ref 0–0.4)
EOSINOPHIL NFR BLD AUTO: 11 %
ERYTHROCYTE [DISTWIDTH] IN BLOOD BY AUTOMATED COUNT: 13.7 % (ref 11.6–15.4)
GLOBULIN SER CALC-MCNC: 2.4 G/DL (ref 1.5–4.5)
GLUCOSE SERPL-MCNC: 207 MG/DL (ref 65–99)
HBA1C MFR BLD: 7.6 % (ref 4.8–5.6)
HCT VFR BLD AUTO: 45.1 % (ref 37.5–51)
HDLC SERPL-MCNC: 47 MG/DL
HGB BLD-MCNC: 14.5 G/DL (ref 13–17.7)
IMM GRANULOCYTES # BLD AUTO: 0 X10E3/UL (ref 0–0.1)
IMM GRANULOCYTES NFR BLD AUTO: 0 %
LDLC SERPL CALC-MCNC: 74 MG/DL (ref 0–99)
LYMPHOCYTES # BLD AUTO: 1.3 X10E3/UL (ref 0.7–3.1)
LYMPHOCYTES NFR BLD AUTO: 16 %
MCH RBC QN AUTO: 28.9 PG (ref 26.6–33)
MCHC RBC AUTO-ENTMCNC: 32.2 G/DL (ref 31.5–35.7)
MCV RBC AUTO: 90 FL (ref 79–97)
MONOCYTES # BLD AUTO: 0.6 X10E3/UL (ref 0.1–0.9)
MONOCYTES NFR BLD AUTO: 7 %
NEUTROPHILS # BLD AUTO: 5.2 X10E3/UL (ref 1.4–7)
NEUTROPHILS NFR BLD AUTO: 65 %
PLATELET # BLD AUTO: 247 X10E3/UL (ref 150–450)
POTASSIUM SERPL-SCNC: 5.2 MMOL/L (ref 3.5–5.2)
PROT SERPL-MCNC: 7 G/DL (ref 6–8.5)
PSA SERPL-MCNC: 0.4 NG/ML (ref 0–4)
RBC # BLD AUTO: 5.02 X10E6/UL (ref 4.14–5.8)
SODIUM SERPL-SCNC: 136 MMOL/L (ref 134–144)
TRIGL SERPL-MCNC: 162 MG/DL (ref 0–149)
VLDLC SERPL CALC-MCNC: 32 MG/DL (ref 5–40)
WBC # BLD AUTO: 8 X10E3/UL (ref 3.4–10.8)

## 2020-06-04 ENCOUNTER — TELEPHONE (OUTPATIENT)
Dept: FAMILY MEDICINE | Facility: CLINIC | Age: 75
End: 2020-06-04

## 2020-06-04 NOTE — TELEPHONE ENCOUNTER
----- Message from VETO Paz sent at 6/3/2020 11:46 AM CDT -----  Labs stable; kidney function still slightly decreased; continue to hydrate well.  OV to review all labs

## 2020-06-08 ENCOUNTER — LAB VISIT (OUTPATIENT)
Dept: PRIMARY CARE CLINIC | Facility: CLINIC | Age: 75
End: 2020-06-08
Payer: MEDICARE

## 2020-06-08 DIAGNOSIS — R39.9 LOWER URINARY TRACT SYMPTOMS (LUTS): ICD-10-CM

## 2020-06-08 PROCEDURE — U0003 INFECTIOUS AGENT DETECTION BY NUCLEIC ACID (DNA OR RNA); SEVERE ACUTE RESPIRATORY SYNDROME CORONAVIRUS 2 (SARS-COV-2) (CORONAVIRUS DISEASE [COVID-19]), AMPLIFIED PROBE TECHNIQUE, MAKING USE OF HIGH THROUGHPUT TECHNOLOGIES AS DESCRIBED BY CMS-2020-01-R: HCPCS

## 2020-06-09 LAB — SARS-COV-2 RNA RESP QL NAA+PROBE: NOT DETECTED

## 2020-06-10 ENCOUNTER — HOSPITAL ENCOUNTER (OUTPATIENT)
Facility: AMBULARY SURGERY CENTER | Age: 75
Discharge: HOME | End: 2020-06-10
Attending: UROLOGY | Admitting: UROLOGY
Payer: MEDICARE

## 2020-06-10 DIAGNOSIS — R39.9 LOWER URINARY TRACT SYMPTOMS (LUTS): Primary | ICD-10-CM

## 2020-06-10 PROCEDURE — 52000 CYSTOURETHROSCOPY: CPT | Mod: ,,, | Performed by: UROLOGY

## 2020-06-10 PROCEDURE — 52000 CYSTOURETHROSCOPY: CPT | Performed by: UROLOGY

## 2020-06-10 PROCEDURE — 52000 PR CYSTOURETHROSCOPY: ICD-10-PCS | Mod: ,,, | Performed by: UROLOGY

## 2020-06-10 PROCEDURE — 76872 PR US TRANSRECTAL: ICD-10-PCS | Mod: 26,,, | Performed by: UROLOGY

## 2020-06-10 PROCEDURE — 76872 US TRANSRECTAL: CPT | Mod: 26,,, | Performed by: UROLOGY

## 2020-06-10 PROCEDURE — 76872 US TRANSRECTAL: CPT | Performed by: UROLOGY

## 2020-06-10 RX ORDER — WATER 1 ML/ML
IRRIGANT IRRIGATION
Status: DISCONTINUED | OUTPATIENT
Start: 2020-06-10 | End: 2020-06-10 | Stop reason: HOSPADM

## 2020-06-10 RX ORDER — CEPHALEXIN 500 MG/1
500 CAPSULE ORAL 4 TIMES DAILY
Qty: 12 CAPSULE | Refills: 0 | Status: SHIPPED | OUTPATIENT
Start: 2020-06-10 | End: 2020-06-13

## 2020-06-10 RX ORDER — LIDOCAINE HYDROCHLORIDE 20 MG/ML
JELLY TOPICAL
Status: DISCONTINUED | OUTPATIENT
Start: 2020-06-10 | End: 2020-06-10 | Stop reason: HOSPADM

## 2020-06-10 NOTE — DISCHARGE INSTRUCTIONS
Cystography (Retrograde Cystography)  Cystography (also called retrograde cystography) is a detailed X-ray exam of your bladder. For this procedure, your bladder is filled with an X-ray dye (contrast medium). The dye lets your bladder be seen more clearly on the X-ray images. This procedure is done by a radiologist.    Why cystography is done  A cystography can help diagnose such bladder problems as:  · Kidney stones  · Wounds or bursting (rupture) of your bladder wall  · Urinary tract infection  · Blood clots  · Tumors  ·     During your procedure  · You will change into a hospital gown and lie on an exam table. Your urethra will be numbed with an anesthetic jelly. You may also be given medication to help you relax.  · A thin tube (catheter) will be put into your urethra up to your bladder. You will feel pressure. The dye will be slowly put through the catheter into your bladder. As your bladder fills with this liquid, you will feel the need to urinate. Tell the radiologist when this becomes uncomfortable.  · X-rays are taken of your full bladder. The catheter is removed, your bladder is then drained, and more X-rays are taken.  Possible risks and complications  · Infection or bruising at the place where the tube is put into your urethra  · Problems due to the dye. This includes an allergic reaction or kidney damage.  · Radiation exposure to your reproductive organs   After your procedure  · You may feel some burning when you urinate the first few times after the test. Drink plenty of water after the exam to help flush the dye out of your body.   · Your provider will discuss your test results with you. He or she will recommend more testing or treatment as needed.  When to call your healthcare provider   Call your provider right away if you have:  · Blood in your urine, after you have gone to the bathroom three times   · Signs of infection, such as chills, fever, rapid heartbeat, or fast breathing         Date  Last Reviewed: 7/23/2015  © 9986-4016 [employer]. [address] [place] [address], [place] [address]. All rights reserved. This information is not intended as a substitute for professional medical care. Always follow your healthcare professional's instructions.

## 2020-06-10 NOTE — OP NOTE
[facility]  Operative/Discharge Note    Date: 06/10/2020    Pre-Op Diagnosis: Lower urinary tract symptoms    Post-Op Diagnosis: Same    Procedure(s) Performed:   1.  Cystoscopy  2.  Transrectal ultrasound     Specimen(s): None    Staff Surgeon: Hunter Guerrero MD    Assistant Surgeon: Hunter Guerrero Jr, MD    Anesthesia: Local anesthesia topical 2% lidocaine gel    Indications: Theo Chen Jr. is a 74 y.o. male with lower urinary tract symptoms.     Patient complains of nocturia x 4, frequency and incomplete emptying for approximately 1 year refractory to Flomax 0.8 MG PO daily and Finasteride 5 mg.      He is extremely bothered by his symptoms and does not notice any relief with medications.      Patient denies any fever, chills, dysuria, urinary tract infection, recent  trauma or history of  malignancy.         PSA  0.7                   5/7/19    Findings: 30 cc prostate with coaptation of bilateral lobes, large bladder volume. Otherwise unremarkable cystoscopy and TRUS.     Estimated Blood Loss: min    Drains: None    TAMICA: 20 grams smooth, anodular    Procedure in Detail:  After risks, benefits and possible complications of cystoscopy and TRUS were explained, the patient elected to undergo the procedure and informed consent was obtained. All questions were answered in the heidi-operative area. The patient was transferred to the cystoscopy suite and placed in the lateral position.     TRUS probe was inserted into the rectum and the prostate measurement was 30 cc.     The patient was placed in the lithotomy position and then prepped and draped in the usual sterile fashion. Lidocaine jelly was administered for local anesthesia. Time out was performed.    A flexible cystoscope was introduced into the bladder per urethra. This passed easily.  The entire urethra was visualized which showed no masses or strictures.  The prostate was 2 cm in length and appeared to have mild coaptation of bilateral lobes and no  enlargement of the median lobe. The right and left ureteral orifices were identified in the normal anatomic position and were seen effluxing clear urine.  Formal cystoscopy was performed which revealed no masses or lesions suspicious for malignancy, no trabeculations, no bladder stones and no bladder diverticuli. Enlarged bladder volume.       The patient tolerated the procedure well and was transferred to recovery in stable condition.    Disposition: Home    Discharge home today status post uncomplicated procedure as above  Diet - resume home diet  Follow up: Return to clinic in 2 - 3 weeks to discuss possible Rezum. Will need AUA symptom score in clinic prior to any procedure.   Instructions: Keflex rx given.  Meds:     Medication List      START taking these medications    cephALEXin 500 MG capsule  Commonly known as:  KEFLEX  Take 1 capsule (500 mg total) by mouth 4 (four) times daily. for 3 days        CONTINUE taking these medications    albuterol 90 mcg/actuation inhaler  Commonly known as:  PROVENTIL/VENTOLIN HFA  Inhale 2 puffs into the lungs every 6 (six) hours as needed for Wheezing.     aspirin 81 MG EC tablet  Commonly known as:  ECOTRIN  Take 1 tablet (81 mg total) by mouth 2 (two) times daily.     doxazosin 2 MG tablet  Commonly known as:  CARDURA  TAKE ONE TABLET BY MOUTH EVERY EVENING.     finasteride 5 mg tablet  Commonly known as:  PROSCAR  Take 1 tablet (5 mg total) by mouth once daily.     fluticasone propionate 50 mcg/actuation nasal spray  Commonly known as:  FLONASE  2 sprays (100 mcg total) by Each Nostril route once daily.     glipiZIDE 10 MG tablet  Commonly known as:  GLUCOTROL  TAKE ONE TABLET BY MOUTH TWICE DAILY     ipratropium 42 mcg (0.06 %) nasal spray  Commonly known as:  ATROVENT     lisinopriL 10 MG tablet  TAKE ONE TABLET BY MOUTH ONCE DAILY     metFORMIN 1000 MG tablet  Commonly known as:  GLUCOPHAGE  TAKE ONE TABLET BY MOUTH TWICE DAILY     metoprolol tartrate 25 MG  tablet  Commonly known as:  LOPRESSOR  TAKE ONE TABLET BY MOUTH TWICE DAILY     pravastatin 20 MG tablet  Commonly known as:  PRAVACHOL  TAKE 1 TABLET BY MOUTH ONCE DAILY AT BEDTIME     SITagliptin 100 MG Tab  Commonly known as:  JANUVIA  Take 1 tablet (100 mg total) by mouth once daily.     tamsulosin 0.4 mg Cap  Commonly known as:  FLOMAX  TAKE TWO CAPSULES BY MOUTH ONCE DAILY.           Where to Get Your Medications      These medications were sent to [id number] - [place] - [address]  [address] [address]    Phone:  [phone]   · cephALEXin 500 MG capsule         Hunter Guerrero Jr, MD

## 2020-06-10 NOTE — INTERVAL H&P NOTE
The patient has been examined and the H&P has been reviewed:    I concur with the findings and changes have been noted since the H&P was written: Ok to proceed at the ASC today.     Anesthesia/Surgery risks, benefits and alternative options discussed and understood by patient/family.          There are no hospital problems to display for this patient.

## 2020-06-12 VITALS
SYSTOLIC BLOOD PRESSURE: 138 MMHG | HEART RATE: 95 BPM | DIASTOLIC BLOOD PRESSURE: 87 MMHG | RESPIRATION RATE: 20 BRPM | HEIGHT: 69 IN | OXYGEN SATURATION: 100 % | BODY MASS INDEX: 31.68 KG/M2 | TEMPERATURE: 98 F | WEIGHT: 213.88 LBS

## 2020-06-15 ENCOUNTER — CLINICAL SUPPORT (OUTPATIENT)
Dept: UROLOGY | Facility: CLINIC | Age: 75
End: 2020-06-15
Payer: MEDICARE

## 2020-06-15 ENCOUNTER — TELEPHONE (OUTPATIENT)
Dept: UROLOGY | Facility: CLINIC | Age: 75
End: 2020-06-15

## 2020-06-15 DIAGNOSIS — R33.9 URINARY RETENTION: Primary | ICD-10-CM

## 2020-06-15 LAB — POC RESIDUAL URINE VOLUME: 360 ML (ref 0–100)

## 2020-06-15 PROCEDURE — 51798 US URINE CAPACITY MEASURE: CPT | Mod: PBBFAC,PN

## 2020-06-15 PROCEDURE — 99499 NO LOS: ICD-10-PCS | Mod: S$PBB,,, | Performed by: UROLOGY

## 2020-06-15 PROCEDURE — 51703 PR INSERTION OF TEMPORARY INDWELLING BLADDER CATHETERIZATION, COMPLICA: ICD-10-PCS | Mod: S$PBB,,, | Performed by: UROLOGY

## 2020-06-15 PROCEDURE — 99499 UNLISTED E&M SERVICE: CPT | Mod: S$PBB,,, | Performed by: UROLOGY

## 2020-06-15 PROCEDURE — 51703 INSERT BLADDER CATH COMPLEX: CPT | Mod: S$PBB,,, | Performed by: UROLOGY

## 2020-06-15 PROCEDURE — 51703 INSERT BLADDER CATH COMPLEX: CPT | Mod: PBBFAC,PN

## 2020-06-15 NOTE — PROGRESS NOTES
Patient here today with c/o unable to urinate for 2 days.  Just dribbling when goes to bathroom.    PVR 360ml.   Verbal order from Dr. Guerrero to place pacheco catheter and return in 1 week for fill and pull voiding trial.      Patient draped and prepped in sterile fashion.   16Fr coude catheter placed into the bladder with no difficulty.    Balloon filled with 10ml saline  300ml drained from bladder  Catheter attached to leg bag.    Leg bag secured to right leg with stat-lock.      Patient left the office in satisfactory condition.

## 2020-06-15 NOTE — TELEPHONE ENCOUNTER
----- Message from Nicky Urbano sent at 6/15/2020 10:12 AM CDT -----  Type: Needs Medical Advice  Who Called:  Pt  Symptoms (please be specific):    How long has patient had these symptoms:   Pharmacy name and phone #:   Best Call Back Number:   Additional Information: requesting a call back having problems releasing urine after procedure. Please contact pt.    .

## 2020-06-22 ENCOUNTER — CLINICAL SUPPORT (OUTPATIENT)
Dept: UROLOGY | Facility: CLINIC | Age: 75
End: 2020-06-22
Payer: MEDICARE

## 2020-06-22 DIAGNOSIS — Z97.8 INDWELLING FOLEY CATHETER PRESENT: Primary | ICD-10-CM

## 2020-06-22 PROCEDURE — 99499 UNLISTED E&M SERVICE: CPT | Mod: S$PBB,,, | Performed by: UROLOGY

## 2020-06-22 PROCEDURE — 99499 NO LOS: ICD-10-PCS | Mod: S$PBB,,, | Performed by: UROLOGY

## 2020-06-22 PROCEDURE — 51700 PR IRRIGATION, BLADDER: ICD-10-PCS | Mod: S$PBB,,, | Performed by: UROLOGY

## 2020-06-22 PROCEDURE — 51700 IRRIGATION OF BLADDER: CPT | Mod: S$PBB,,, | Performed by: UROLOGY

## 2020-06-22 NOTE — PROGRESS NOTES
Patient arrived to clinic for fill and pull. Leg bag removed from pacheco catheter instilled 250 ml saline into bladder when patient stated he felt like he had to use bathroom. Deflated 10 ml saline balloon, removed 16 fr pacheco, urinal given, patient stood up and urinated 200 ml yellow urine into urinal, with no difficulty. Follow up appt slip given, patient verbally understood.

## 2020-06-23 ENCOUNTER — NURSE TRIAGE (OUTPATIENT)
Dept: ADMINISTRATIVE | Facility: CLINIC | Age: 75
End: 2020-06-23

## 2020-06-23 NOTE — TELEPHONE ENCOUNTER
Post procedure follow up call, patient denies Fever. Does have mild SOB on exertion and persistent cough, both present prior to hospital encounter. Was tested negative for covid virus. Has follow up appt with ENT tomorrow for chronic sinusitis issues. No further triage needed.     Reason for Disposition   [1] Follow-up call to recent contact AND [2] information only call, no triage required    Additional Information   Negative: Caller has already spoken with the PCP (or office), and has no further questions   Negative: Caller has already spoken with another triager and has no further questions   Negative: [1] Caller is not with the adult (patient) AND [2] reporting urgent symptoms   Negative: Lab result questions   Negative: Medication questions   Negative: Caller can't be reached by phone   Negative: Caller has already spoken to PCP or another triager   Negative: RN needs further essential information from caller in order to complete triage   Negative: Requesting regular office appointment   Negative: [1] Caller requesting NON-URGENT health information AND [2] PCP's office is the best resource   Negative: Health Information question, no triage required and triager able to answer question   Negative: General information question, no triage required and triager able to answer question   Negative: Question about upcoming scheduled test, no triage required and triager able to answer question   Negative: [1] Caller is not with the adult (patient) AND [2] probable NON-URGENT symptoms    Protocols used: INFORMATION ONLY CALL-A-, NO CONTACT OR DUPLICATE CONTACT CALL-A-[place]

## 2020-06-29 ENCOUNTER — OFFICE VISIT (OUTPATIENT)
Dept: FAMILY MEDICINE | Facility: CLINIC | Age: 75
End: 2020-06-29
Payer: MEDICARE

## 2020-06-29 VITALS
BODY MASS INDEX: 30.51 KG/M2 | OXYGEN SATURATION: 95 % | HEIGHT: 69 IN | HEART RATE: 96 BPM | SYSTOLIC BLOOD PRESSURE: 126 MMHG | TEMPERATURE: 98 F | WEIGHT: 206 LBS | DIASTOLIC BLOOD PRESSURE: 60 MMHG

## 2020-06-29 DIAGNOSIS — I10 ESSENTIAL HYPERTENSION: ICD-10-CM

## 2020-06-29 DIAGNOSIS — E78.5 DYSLIPIDEMIA: ICD-10-CM

## 2020-06-29 DIAGNOSIS — R35.1 EXCESSIVE URINATION AT NIGHT: ICD-10-CM

## 2020-06-29 DIAGNOSIS — E11.22 TYPE 2 DIABETES MELLITUS WITH STAGE 3 CHRONIC KIDNEY DISEASE, WITHOUT LONG-TERM CURRENT USE OF INSULIN: ICD-10-CM

## 2020-06-29 DIAGNOSIS — R06.02 SHORTNESS OF BREATH: ICD-10-CM

## 2020-06-29 DIAGNOSIS — J43.9 PULMONARY EMPHYSEMA, UNSPECIFIED EMPHYSEMA TYPE: Primary | ICD-10-CM

## 2020-06-29 DIAGNOSIS — N18.30 TYPE 2 DIABETES MELLITUS WITH STAGE 3 CHRONIC KIDNEY DISEASE, WITHOUT LONG-TERM CURRENT USE OF INSULIN: ICD-10-CM

## 2020-06-29 PROCEDURE — 99213 OFFICE O/P EST LOW 20 MIN: CPT | Mod: S$PBB,,, | Performed by: NURSE PRACTITIONER

## 2020-06-29 PROCEDURE — 99215 OFFICE O/P EST HI 40 MIN: CPT | Performed by: NURSE PRACTITIONER

## 2020-06-29 PROCEDURE — 99213 PR OFFICE/OUTPT VISIT, EST, LEVL III, 20-29 MIN: ICD-10-PCS | Mod: S$PBB,,, | Performed by: NURSE PRACTITIONER

## 2020-06-29 RX ORDER — CEFDINIR 300 MG/1
CAPSULE ORAL
COMMUNITY
Start: 2020-06-26 | End: 2020-07-20

## 2020-06-29 RX ORDER — LISINOPRIL 10 MG/1
10 TABLET ORAL DAILY
Qty: 90 TABLET | Refills: 1 | Status: SHIPPED | OUTPATIENT
Start: 2020-06-29 | End: 2020-11-23 | Stop reason: SDUPTHER

## 2020-06-29 RX ORDER — PREDNISONE 10 MG/1
TABLET ORAL
COMMUNITY
Start: 2020-06-26 | End: 2020-07-20

## 2020-06-29 RX ORDER — METFORMIN HYDROCHLORIDE 1000 MG/1
1000 TABLET ORAL 2 TIMES DAILY
Qty: 180 TABLET | Refills: 1 | Status: SHIPPED | OUTPATIENT
Start: 2020-06-29 | End: 2020-12-30 | Stop reason: SDUPTHER

## 2020-06-29 RX ORDER — DOXAZOSIN 2 MG/1
2 TABLET ORAL NIGHTLY
Qty: 90 TABLET | Refills: 1 | Status: SHIPPED | OUTPATIENT
Start: 2020-06-29 | End: 2020-12-30 | Stop reason: SDUPTHER

## 2020-06-29 RX ORDER — METOPROLOL TARTRATE 25 MG/1
25 TABLET, FILM COATED ORAL 2 TIMES DAILY
Qty: 180 TABLET | Refills: 1 | Status: SHIPPED | OUTPATIENT
Start: 2020-06-29 | End: 2020-12-30 | Stop reason: SDUPTHER

## 2020-06-29 RX ORDER — GLIPIZIDE 10 MG/1
10 TABLET ORAL 2 TIMES DAILY
Qty: 180 TABLET | Refills: 1 | Status: SHIPPED | OUTPATIENT
Start: 2020-06-29 | End: 2020-12-30 | Stop reason: SDUPTHER

## 2020-06-29 RX ORDER — MONTELUKAST SODIUM 10 MG/1
10 TABLET ORAL DAILY
COMMUNITY
Start: 2020-06-24 | End: 2020-11-23 | Stop reason: SDUPTHER

## 2020-06-29 RX ORDER — PRAVASTATIN SODIUM 20 MG/1
20 TABLET ORAL NIGHTLY
Qty: 90 TABLET | Refills: 1 | Status: SHIPPED | OUTPATIENT
Start: 2020-06-29 | End: 2020-12-30 | Stop reason: SDUPTHER

## 2020-06-29 NOTE — PATIENT INSTRUCTIONS
Diagnosing COPD  Your healthcare provider will use your past health history, a physical exam, and certain tests to diagnose COPD.  Health history  Your healthcare provider learns about your health history by asking questions. Topics include:  · History of present illness. Tell your healthcare provider about your symptoms. Also tell him or her how long you have had them.  · Past medical and surgical history. Share other health problems and surgery you have had.  · Family history. Report serious health problems in close family members. This is especially true of any lung problems.  · Social and environmental history. The most important factor in COPD is whether you smoke or have smoked in the past. You should also tell your provider if you have been around secondhand smoke, harmful chemicals, or air pollution.  · Functional assessment. Report whether breathing gets in the way of your daily activities.  Physical exam    Your provider will examine you. He or she will check your heart and lungs with a stethoscope. The focus will be on your airways, including your nose and throat.   Diagnostic tests  · Pulmonary function tests measure the flow of air into and out of your lungs. They also check how much air your lungs can hold. The most common pulmonary function test is spirometry. This measures how fast and how much air you can blow out (exhale). This test is important to help diagnose COPD.  · Pulse oximetry shows how much oxygen is in your blood (oxygen saturation). This may be done at rest. It may also be done during and after exercise.  · Arterial blood gas tests measure levels of oxygen and carbon dioxide in your blood.  · Chest X-rays show the size and shape of your lungs. They can also show certain problems in the lungs.  · CT scans show detailed images of the lungs.  Date Last Reviewed: 10/1/2016  © 3285-2688 [employer]. [address] [place] [address], [place] [address]. All rights reserved. This  information is not intended as a substitute for professional medical care. Always follow your healthcare professional's instructions.

## 2020-06-29 NOTE — PROGRESS NOTES
SUBJECTIVE:      Patient ID: Theo Chen Jr. is a 75 y.o. male.    Chief Complaint: Shortness of Breath (COPD)    Mr. Venegas is here today for evaluation increasing shortness of breath.  He states he went to urgent care and was diagnosed with COPD.  He has finished the steroid taper and is on antibiotics.  He states his shortness of breath has not improved.  He is also here for routine follow-up for his diabetes and hypertension.  He continues to follow an unhealthy diet.  He states he has been unable to exercise due to the increasing shortness of breath.  He does have an appointment with pulmonology that is at the end of July.    Shortness of Breath  This is a new problem. The current episode started more than 1 month ago. The problem occurs every few minutes. Pertinent negatives include no abdominal pain, chest pain, claudication, coryza, ear pain, fever, headaches, hemoptysis, leg pain, leg swelling, neck pain, orthopnea, PND, rash, rhinorrhea, sore throat, sputum production, swollen glands, syncope, vomiting or wheezing. The symptoms are aggravated by any activity. He has tried beta agonist inhalers, OTC cough suppressants and ipratropium inhalers for the symptoms. The treatment provided mild relief.   Cough  This is a new problem. The current episode started more than 1 month ago. The problem has been gradually worsening. The problem occurs every few minutes. The cough is productive of sputum. Associated symptoms include nasal congestion, shortness of breath, sweats and weight loss. Pertinent negatives include no chest pain, chills, ear congestion, ear pain, fever, headaches, heartburn, hemoptysis, myalgias, postnasal drip, rash, rhinorrhea, sore throat or wheezing. The symptoms are aggravated by exercise. He has tried a beta-agonist inhaler, ipratropium inhaler and OTC cough suppressant for the symptoms. The treatment provided mild relief.   Diabetes  He presents for his follow-up diabetic visit. He  has type 2 diabetes mellitus. His disease course has been stable. Hypoglycemia symptoms include sweats. Pertinent negatives for hypoglycemia include no confusion, dizziness, headaches, nervousness/anxiousness or pallor. Associated symptoms include weight loss. Pertinent negatives for diabetes include no blurred vision, no chest pain, no fatigue, no foot paresthesias, no foot ulcerations, no polydipsia, no polyphagia, no polyuria, no visual change and no weakness. Symptoms are stable. Risk factors for coronary artery disease include dyslipidemia, family history, male sex, obesity, hypertension and sedentary lifestyle. Current diabetic treatment includes oral agent (triple therapy) and diet. He is compliant with treatment some of the time. His weight is decreasing steadily. He is following a generally unhealthy diet. He has not had a previous visit with a dietitian. He rarely participates in exercise. An ACE inhibitor/angiotensin II receptor blocker is being taken. He does not see a podiatrist.Eye exam is not current.   Hypertension  Associated symptoms include shortness of breath and sweats. Pertinent negatives include no anxiety, blurred vision, chest pain, headaches, malaise/fatigue, neck pain, orthopnea, palpitations, peripheral edema or PND. Risk factors for coronary artery disease include diabetes mellitus, male gender, obesity and sedentary lifestyle. Past treatments include ACE inhibitors, alpha 1 blockers, beta blockers and lifestyle changes. The current treatment provides moderate improvement. Compliance problems include diet and exercise.        Past Surgical History:   Procedure Laterality Date    APPENDECTOMY      CYSTOSCOPY N/A 6/10/2020    Procedure: CYSTOSCOPY;  Surgeon: Hunter Guerrero Jr., MD;  [facility]: [facility] OR;  Service: Urology;  Laterality: N/A;    EYE SURGERY Left     TRANSRECTAL ULTRASOUND EXAMINATION N/A 6/10/2020    Procedure: ULTRASOUND, RECTAL APPROACH;  Surgeon: Hunter Guerrero Jr., MD;   Location: [facility] OR;  Service: Urology;  Laterality: N/A;     Family History   Problem Relation Age of Onset    Hypertension Mother     Heart disease Father       Social History     Socioeconomic History    Marital status:      Spouse name: Not on file    Number of children: Not on file    Years of education: Not on file    Highest education level: Not on file   Occupational History    Occupation: retired    Social Needs    Financial resource strain: Not on file    Food insecurity     Worry: Not on file     Inability: Not on file    Transportation needs     Medical: Not on file     Non-medical: Not on file   Tobacco Use    Smoking status: Former Smoker    Smokeless tobacco: Never Used   Substance and Sexual Activity    Alcohol use: No    Drug use: No    Sexual activity: Not on file   Lifestyle    Physical activity     Days per week: Not on file     Minutes per session: Not on file    Stress: Not on file   Relationships    Social connections     Talks on phone: Not on file     Gets together: Not on file     Attends Bahai service: Not on file     Active member of club or organization: Not on file     Attends meetings of clubs or organizations: Not on file     Relationship status: Not on file   Other Topics Concern    Not on file   Social History Narrative    Not on file     Current Outpatient Medications   Medication Sig Dispense Refill    albuterol (PROVENTIL/VENTOLIN HFA) 90 mcg/actuation inhaler Inhale 2 puffs into the lungs every 6 (six) hours as needed for Wheezing. 18 g 3    aspirin (ECOTRIN) 81 MG EC tablet Take 1 tablet (81 mg total) by mouth 2 (two) times daily.      cefdinir (OMNICEF) 300 MG capsule       doxazosin (CARDURA) 2 MG tablet Take 1 tablet (2 mg total) by mouth every evening. 90 tablet 1    finasteride (PROSCAR) 5 mg tablet Take 1 tablet (5 mg total) by mouth once daily. 90 tablet 1    fluticasone propionate (FLONASE) 50 mcg/actuation nasal spray 2 sprays (100  mcg total) by Each Nostril route once daily. 18 g 3    glipiZIDE (GLUCOTROL) 10 MG tablet Take 1 tablet (10 mg total) by mouth 2 (two) times daily. 180 tablet 1    ipratropium (ATROVENT) 42 mcg (0.06 %) nasal spray       JANUVIA 100 mg Tab TAKE ONE TABLET BY MOUTH ONCE DAILY  90 tablet 1    lisinopriL 10 MG tablet Take 1 tablet (10 mg total) by mouth once daily. 90 tablet 1    metFORMIN (GLUCOPHAGE) 1000 MG tablet Take 1 tablet (1,000 mg total) by mouth 2 (two) times daily. 180 tablet 1    metoprolol tartrate (LOPRESSOR) 25 MG tablet Take 1 tablet (25 mg total) by mouth 2 (two) times daily. 180 tablet 1    montelukast (SINGULAIR) 10 mg tablet       pravastatin (PRAVACHOL) 20 MG tablet Take 1 tablet (20 mg total) by mouth nightly. 90 tablet 1    tamsulosin (FLOMAX) 0.4 mg Cap TAKE TWO CAPSULES BY MOUTH ONCE DAILY. 180 capsule 0    fluticasone-umeclidin-vilanter (TRELEGY ELLIPTA) 100-62.5-25 mcg DsDv Inhale 1 puff into the lungs once daily. 1 each 3    predniSONE (DELTASONE) 10 MG tablet        No current facility-administered medications for this visit.      Review of patient's allergies indicates:  No Known Allergies   Past Medical History:   Diagnosis Date    Diabetes mellitus, type 2     Gastroenteritis     Seasonal allergic rhinitis due to pollen     Shingles      Past Surgical History:   Procedure Laterality Date    APPENDECTOMY      CYSTOSCOPY N/A 6/10/2020    Procedure: CYSTOSCOPY;  Surgeon: Hunter Guerrero Jr., MD;  Location: [facility] OR;  Service: Urology;  Laterality: N/A;    EYE SURGERY Left     TRANSRECTAL ULTRASOUND EXAMINATION N/A 6/10/2020    Procedure: ULTRASOUND, RECTAL APPROACH;  Surgeon: Hunter Guerrero Jr., MD;  Location: [facility] OR;  Service: Urology;  Laterality: N/A;       Review of Systems   Constitutional: Positive for weight loss. Negative for activity change, appetite change, chills, diaphoresis, fatigue, fever, malaise/fatigue and unexpected weight change.   HENT: Negative for  "congestion, ear pain, hearing loss, nosebleeds, postnasal drip, rhinorrhea, sore throat and voice change.    Eyes: Negative for blurred vision, pain, discharge and visual disturbance.   Respiratory: Positive for cough and shortness of breath. Negative for apnea, hemoptysis, sputum production, chest tightness and wheezing.    Cardiovascular: Negative for chest pain, palpitations, orthopnea, claudication, leg swelling, syncope and PND.   Gastrointestinal: Negative for abdominal pain, constipation, diarrhea, heartburn, nausea and vomiting.   Endocrine: Negative for polydipsia, polyphagia and polyuria.   Musculoskeletal: Negative for arthralgias, gait problem, myalgias and neck pain.   Skin: Negative for color change, pallor and rash.   Allergic/Immunologic: Negative for immunocompromised state.   Neurological: Negative for dizziness, syncope, weakness, numbness and headaches.   Hematological: Negative for adenopathy. Does not bruise/bleed easily.   Psychiatric/Behavioral: Negative for confusion, dysphoric mood, self-injury, sleep disturbance and suicidal ideas. The patient is not nervous/anxious.       OBJECTIVE:      Vitals:    06/29/20 1517   BP: 126/60   BP Location: Left arm   Patient Position: Sitting   BP Method: Medium (Manual)   Pulse: 96   Temp: 97.9 °F (36.6 °C)   SpO2: 95%   Weight: 93.4 kg (206 lb)   Height: 5' 9" (1.753 m)     Physical Exam  Constitutional:       General: He is not in acute distress.     Appearance: Normal appearance. He is well-developed. He is not ill-appearing or diaphoretic.   HENT:      Head: Normocephalic and atraumatic.      Right Ear: Tympanic membrane, ear canal and external ear normal.      Left Ear: Tympanic membrane, ear canal and external ear normal.      Nose: Nose normal. No congestion or rhinorrhea.      Mouth/Throat:      Mouth: Mucous membranes are moist.      Pharynx: Oropharynx is clear. Uvula midline. No oropharyngeal exudate or posterior oropharyngeal erythema. "   Eyes:      General: Lids are normal. No scleral icterus.        Right eye: No discharge.         Left eye: No discharge.      Extraocular Movements: Extraocular movements intact.      Conjunctiva/sclera: Conjunctivae normal.      Pupils: Pupils are equal, round, and reactive to light.   Neck:      Musculoskeletal: Normal range of motion and neck supple.      Thyroid: No thyromegaly.      Vascular: No carotid bruit.      Trachea: No tracheal deviation.   Cardiovascular:      Rate and Rhythm: Normal rate and regular rhythm.      Heart sounds: Normal heart sounds. No murmur. No friction rub. No gallop.    Pulmonary:      Effort: Pulmonary effort is normal. No respiratory distress.      Breath sounds: Decreased air movement (right sided) present. No stridor. Rales (bilat) present. No wheezing.   Abdominal:      General: Bowel sounds are normal. There is no distension.      Palpations: Abdomen is soft. Abdomen is not rigid. There is no mass.      Tenderness: There is no abdominal tenderness. There is no guarding or rebound.   Musculoskeletal: Normal range of motion.   Lymphadenopathy:      Cervical: No cervical adenopathy.   Skin:     General: Skin is warm and dry.      Capillary Refill: Capillary refill takes less than 2 seconds.      Coloration: Skin is not pale.      Findings: No erythema.   Neurological:      Mental Status: He is alert and oriented to person, place, and time.   Psychiatric:         Mood and Affect: Mood normal.         Behavior: Behavior normal.         Thought Content: Thought content normal. Thought content does not include suicidal plan.         Judgment: Judgment normal.         Clinical Support on 06/15/2020   Component Date Value Ref Range Status    POC Residual Urine Volume 06/15/2020 360* 0 - 100 mL Final   Lab Visit on 06/08/2020   Component Date Value Ref Range Status    SARS-CoV2 (COVID-19) Qualitative P* 06/08/2020 Not Detected  Not Detected Final    Comment: This test utilizes a  real-time reverse transcription  polymerase chain reaction procedure to amplify and   detect the SARS-CoV-2 RdRp and N genes.    The analytical sensitivity (limit of detection) of   this assay is 100 copies/mL.   A Detected result is considered positive for COVID-19.  This patient is considered infected with the   SARS-CoV-2 virus and is presumed to be contagious.    A Not Detected result means that SARS-CoV-2 RNA is not  present above the limit of detection. It does not rule  out the possibility of COVID-19 and should not be the  sole basis for treatment decisions.  If COVID-19 is   strongly suspected based on clinical and exposure   history,re-testing should be considered.    This test is only for use under Food and Drug   Administration s Emergency Use Authorization (EUA).   Commercial reagents are provided by [employer].  Performance characteristics of the EUA have been   independently verified by [facility]   Department of Pathology and L                           aboratory Medicine.       Office Visit on 01/06/2020   Component Date Value Ref Range Status    WBC 06/02/2020 8.0  3.4 - 10.8 x10E3/uL Final    RBC 06/02/2020 5.02  4.14 - 5.80 x10E6/uL Final    Hemoglobin 06/02/2020 14.5  13.0 - 17.7 g/dL Final    Hematocrit 06/02/2020 45.1  37.5 - 51.0 % Final    Mean Corpuscular Volume 06/02/2020 90  79 - 97 fL Final    Mean Corpuscular Hemoglobin 06/02/2020 28.9  26.6 - 33.0 pg Final    Mean Corpuscular Hemoglobin Conc 06/02/2020 32.2  31.5 - 35.7 g/dL Final    RDW 06/02/2020 13.7  11.6 - 15.4 % Final    Platelets 06/02/2020 247  150 - 450 x10E3/uL Final    Neutrophils 06/02/2020 65  Not Estab. % Final    Lymph% 06/02/2020 16  Not Estab. % Final    Mono% 06/02/2020 7  Not Estab. % Final    Eosinophil% 06/02/2020 11  Not Estab. % Final    Basophil% 06/02/2020 1  Not Estab. % Final    Neutrophils Absolute 06/02/2020 5.2  1.4 - 7.0 x10E3/uL Final    Lymph # 06/02/2020 1.3  0.7 -  3.1 x10E3/uL Final    Mono # 06/02/2020 0.6  0.1 - 0.9 x10E3/uL Final    Eos # 06/02/2020 0.9* 0.0 - 0.4 x10E3/uL Final    Baso # 06/02/2020 0.1  0.0 - 0.2 x10E3/uL Final    Immature Granulocytes 06/02/2020 0  Not Estab. % Final    Immature Grans (Abs) 06/02/2020 0.0  0.0 - 0.1 x10E3/uL Final    Glucose 06/02/2020 207* 65 - 99 mg/dL Final    BUN, Bld 06/02/2020 19  8 - 27 mg/dL Final    Creatinine 06/02/2020 1.53* 0.76 - 1.27 mg/dL Final    eGFR if non African American 06/02/2020 44* >59 mL/min/1.73 Final    eGFR if African American 06/02/2020 50* >59 mL/min/1.73 Final    BUN/Creatinine Ratio 06/02/2020 12  10 - 24 Final    Sodium 06/02/2020 136  134 - 144 mmol/L Final    Potassium 06/02/2020 5.2  3.5 - 5.2 mmol/L Final    Chloride 06/02/2020 97  96 - 106 mmol/L Final    CO2 06/02/2020 22  20 - 29 mmol/L Final    Calcium 06/02/2020 9.6  8.6 - 10.2 mg/dL Final    Total Protein 06/02/2020 7.0  6.0 - 8.5 g/dL Final    Albumin 06/02/2020 4.6  3.7 - 4.7 g/dL Final    Globulin, Total 06/02/2020 2.4  1.5 - 4.5 g/dL Final    Albumin/Globulin Ratio 06/02/2020 1.9  1.2 - 2.2 Final    Total Bilirubin 06/02/2020 0.5  0.0 - 1.2 mg/dL Final    Alkaline Phosphatase 06/02/2020 67  39 - 117 IU/L Final    AST 06/02/2020 23  0 - 40 IU/L Final    ALT 06/02/2020 27  0 - 44 IU/L Final    Cholesterol 06/02/2020 153  100 - 199 mg/dL Final    Triglycerides 06/02/2020 162* 0 - 149 mg/dL Final    HDL 06/02/2020 47  >39 mg/dL Final    VLDL Cholesterol Yung 06/02/2020 32  5 - 40 mg/dL Final    LDL Calculated 06/02/2020 74  0 - 99 mg/dL Final    PSA - [facility] 06/02/2020 0.4  0.0 - 4.0 ng/mL Final    Comment: Roche ECLIA methodology.  According to the American Urological Association, Serum PSA should  decrease and remain at undetectable levels after radical  prostatectomy. The AUA defines biochemical recurrence as an initial  PSA value 0.2 ng/mL or greater followed by a subsequent confirmatory  PSA value 0.2 ng/mL or  greater.  Values obtained with different assay methods or kits cannot be used  interchangeably. Results cannot be interpreted as absolute evidence  of the presence or absence of malignant disease.      Hemoglobin A1C 06/02/2020 7.6* 4.8 - 5.6 % Final    Comment:          Prediabetes: 5.7 - 6.4           Diabetes: >6.4           Glycemic control for adults with diabetes: <7.0     ]  Assessment:       1. Pulmonary emphysema, unspecified emphysema type    2. Shortness of breath    3. Dyslipidemia    4. Essential hypertension    5. Type 2 diabetes mellitus with stage 3 chronic kidney disease, without long-term current use of insulin    6. Excessive urination at night        Plan:       Pulmonary emphysema, unspecified emphysema type  -     fluticasone-umeclidin-vilanter (TRELEGY ELLIPTA) 100-62.5-25 mcg DsDv; Inhale 1 puff into the lungs once daily.  Dispense: 1 each; Refill: 3   Keep appt with pulmonology    Shortness of breath  -     CT Chest With Contrast; Future    Dyslipidemia  -     pravastatin (PRAVACHOL) 20 MG tablet; Take 1 tablet (20 mg total) by mouth nightly.  Dispense: 90 tablet; Refill: 1   Stable; continue current medications.    Essential hypertension  -     metoprolol tartrate (LOPRESSOR) 25 MG tablet; Take 1 tablet (25 mg total) by mouth 2 (two) times daily.  Dispense: 180 tablet; Refill: 1  -     lisinopriL 10 MG tablet; Take 1 tablet (10 mg total) by mouth once daily.  Dispense: 90 tablet; Refill: 1   Stable; continue current medications.    Type 2 diabetes mellitus with stage 3 chronic kidney disease, without long-term current use of insulin  -     lisinopriL 10 MG tablet; Take 1 tablet (10 mg total) by mouth once daily.  Dispense: 90 tablet; Refill: 1  -     glipiZIDE (GLUCOTROL) 10 MG tablet; Take 1 tablet (10 mg total) by mouth 2 (two) times daily.  Dispense: 180 tablet; Refill: 1  -     metFORMIN (GLUCOPHAGE) 1000 MG tablet; Take 1 tablet (1,000 mg total) by mouth 2 (two) times daily.   Dispense: 180 tablet; Refill: 1   Stable; continue current medications.    Excessive urination at night  -     doxazosin (CARDURA) 2 MG tablet; Take 1 tablet (2 mg total) by mouth every evening.  Dispense: 90 tablet; Refill: 1        Follow up in about 6 months (around 12/29/2020) for diabetes.      6/29/2020 RONNIE Kennedy, FNP

## 2020-07-02 ENCOUNTER — OFFICE VISIT (OUTPATIENT)
Dept: UROLOGY | Facility: CLINIC | Age: 75
End: 2020-07-02
Payer: MEDICARE

## 2020-07-02 VITALS
HEIGHT: 69 IN | BODY MASS INDEX: 30.66 KG/M2 | DIASTOLIC BLOOD PRESSURE: 82 MMHG | HEART RATE: 84 BPM | WEIGHT: 207 LBS | RESPIRATION RATE: 18 BRPM | SYSTOLIC BLOOD PRESSURE: 133 MMHG

## 2020-07-02 DIAGNOSIS — R39.9 LOWER URINARY TRACT SYMPTOMS (LUTS): Primary | ICD-10-CM

## 2020-07-02 PROCEDURE — 99214 OFFICE O/P EST MOD 30 MIN: CPT | Mod: PBBFAC,PN | Performed by: NURSE PRACTITIONER

## 2020-07-02 PROCEDURE — 99024 POSTOP FOLLOW-UP VISIT: CPT | Mod: POP,,, | Performed by: NURSE PRACTITIONER

## 2020-07-02 PROCEDURE — 99999 PR PBB SHADOW E&M-EST. PATIENT-LVL IV: ICD-10-PCS | Mod: PBBFAC,,, | Performed by: NURSE PRACTITIONER

## 2020-07-02 PROCEDURE — 99024 PR POST-OP FOLLOW-UP VISIT: ICD-10-PCS | Mod: POP,,, | Performed by: NURSE PRACTITIONER

## 2020-07-02 PROCEDURE — 99999 PR PBB SHADOW E&M-EST. PATIENT-LVL IV: CPT | Mod: PBBFAC,,, | Performed by: NURSE PRACTITIONER

## 2020-07-02 NOTE — PROGRESS NOTES
[facility] Note  Staff: MABEL Mack    PCP: Sandy    Chief Complaint: F/UP-Cysto    Subjective:        HPI: Theo Chen Jr. is a 75 y.o. male presents today for postop f/up of Cysto/Trus done by Dr. Guerrero.  Today, pt doing well with no  complaints voiced, no gross hematuria, no dysuria noted by pt.    06/10/2020: Cystoscopy, TRUS by Dr. Hunter Guerrero  Findings:  A flexible cystoscope was introduced into the bladder per urethra. This passed easily.  The entire urethra was visualized which showed no masses or strictures.  The prostate was 2 cm in length and appeared to have mild coaptation of bilateral lobes and no enlargement of the median lobe. The right and left ureteral orifices were identified in the normal anatomic position and were seen effluxing clear urine.  Formal cystoscopy was performed which revealed no masses or lesions suspicious for malignancy, no trabeculations, no bladder stones and no bladder diverticuli. Enlarged bladder volume.        UA deferred today by pt-just went to bathroom prior to visit.    AUA SS Today: 14/3  Feeling of ICBE:4  Frequency:3  Intermittency:5  Urgency:0  Weak urine stream:0  Strainin  Nocturia:2    REVIEW OF SYSTEMS:  A comprehensive 10 system review was performed and is negative except as noted above in HPI    PMHx:  Past Medical History:   Diagnosis Date    Diabetes mellitus, type 2     Gastroenteritis     Seasonal allergic rhinitis due to pollen     Shingles      PSHx:  Past Surgical History:   Procedure Laterality Date    APPENDECTOMY      CYSTOSCOPY N/A 6/10/2020    Procedure: CYSTOSCOPY;  Surgeon: Hunter Guerrero Jr., MD;  [facility]  Service: [facility]rology;  Laterality: N/A;    EYE SURGERY Left     TRANSRECTAL ULTRASOUND EXAMINATION N/A 6/10/2020    Procedure: ULTRASOUND, RECTAL APPROACH;  Surgeon: Hunter Guerrero Jr., MD;  [facility];  Service: Urology;  Laterality: N/A;     Allergies:  Patient has no known  allergies.    Medications: reviewed   Objective:     Vitals:    07/02/20 1211   BP: 133/82   Pulse: 84   Resp: 18     General:WDWN in NAD  Eyes: PERRLA, normal conjunctiva  Respiratory: no increased work on breathing, clear to auscultation  Cardiovascular: regular rate and rhythm. No obvious extremity edema.  GI: palpation of masses. No tenderness. No hepatosplenomegaly to palpation.  Musculoskeletal: normal range of motion of bilateral upper extremities. Normal muscle strength and tone.  Skin: no obvious rashes or lesions. No tightening of skin noted.  Neurologic: CN grossly normal. Normal sensation.   Psychiatric: awake, alert and oriented x 3. Mood and affect normal. Cooperative.    Assessment:       1. Lower urinary tract symptoms (LUTS)          Plan:     Rezum video and information was discussed with pt today as another option for treatment of his lower urinary tract symptoms.  At this time, he is maxed out on p.o. medications.    As discussed today, pt would like to proceed with Rezum procedure with Dr. Guerrero in near future.  Therefore we will make note to MD and nurse.  Will have nurse contact patient for future appt. With dates available.  At this time pt was advised to continue his p.o.  meds unless otherwise stated by MD.    F/u with Dr. Guerrero to schedule and consent for rezum procedure.    Kelleener: N/A    MABEL Larsen

## 2020-07-07 ENCOUNTER — TELEPHONE (OUTPATIENT)
Dept: FAMILY MEDICINE | Facility: CLINIC | Age: 75
End: 2020-07-07

## 2020-07-07 ENCOUNTER — TELEPHONE (OUTPATIENT)
Dept: UROLOGY | Facility: CLINIC | Age: 75
End: 2020-07-07

## 2020-07-07 RX ORDER — FLUCONAZOLE 150 MG/1
150 TABLET ORAL EVERY OTHER DAY
Qty: 3 TABLET | Refills: 1 | Status: SHIPPED | OUTPATIENT
Start: 2020-07-07 | End: 2020-07-20

## 2020-07-07 NOTE — TELEPHONE ENCOUNTER
Went to [facility] and was given steriods and now has a yeast infection in mouth and rash(ringworms) on feet again. I am working on getting his visit notes.

## 2020-07-07 NOTE — TELEPHONE ENCOUNTER
----- Message from Hunter Guerrero Jr., MD sent at 7/7/2020  2:13 PM CDT -----  Regarding: RE: REZUM PROCEDURE  Magaly - can we touch base with this patient and see if 7/21 works ok for Rezum? Thanks.   ----- Message -----  From: VETO Yates  Sent: 7/2/2020   1:10 PM CDT  To: Maglay Islas LPN, Hunter Guerrero Jr., MD  Subject: REZUM PROCEDURE                                  Pt would like to go ahead and schedule Rezum procedure.  I have seen this pt this afternoon for postop f/up cysto/trus by Dr. Guerrero.  Please contact pt to schedule or how you would like to proceed.       detailed exam

## 2020-07-09 ENCOUNTER — TELEPHONE (OUTPATIENT)
Dept: UROLOGY | Facility: CLINIC | Age: 75
End: 2020-07-09

## 2020-07-09 NOTE — TELEPHONE ENCOUNTER
----- Message from Hunter Guerrero Jr., MD sent at 7/7/2020  2:13 PM CDT -----  Regarding: RE: REZUM PROCEDURE  Magaly - can we touch base with this patient and see if 7/21 works ok for Rezum? Thanks.   ----- Message -----  From: VETO Yates  Sent: 7/2/2020   1:10 PM CDT  To: Magaly Islas LPN, Hunter Guerrero Jr., MD  Subject: REZUM PROCEDURE                                  Pt would like to go ahead and schedule Rezum procedure.  I have seen this pt this afternoon for postop f/up cysto/trus by Dr. Guerrero.  Please contact pt to schedule or how you would like to proceed.

## 2020-07-09 NOTE — TELEPHONE ENCOUNTER
Called and spoke to patient regarding scheduling procedure date. Patient stated that 7/21 is good. Informed patient will call back to schedule COVID testing. Patient verbalized understanding.

## 2020-07-10 ENCOUNTER — HOSPITAL ENCOUNTER (OUTPATIENT)
Dept: RADIOLOGY | Facility: HOSPITAL | Age: 75
Discharge: HOME | End: 2020-07-10
Attending: NURSE PRACTITIONER
Payer: MEDICARE

## 2020-07-10 ENCOUNTER — PATIENT MESSAGE (OUTPATIENT)
Dept: FAMILY MEDICINE | Facility: CLINIC | Age: 75
End: 2020-07-10

## 2020-07-10 DIAGNOSIS — R06.02 SHORTNESS OF BREATH: ICD-10-CM

## 2020-07-10 DIAGNOSIS — D17.4: Primary | ICD-10-CM

## 2020-07-10 DIAGNOSIS — R39.9 LOWER URINARY TRACT SYMPTOMS (LUTS): Primary | ICD-10-CM

## 2020-07-10 LAB
CREAT SERPL-MCNC: 1.6 MG/DL (ref 0.5–1.4)
SAMPLE: ABNORMAL

## 2020-07-10 PROCEDURE — 71260 CT THORAX DX C+: CPT | Mod: TC,PO

## 2020-07-10 PROCEDURE — 25500020 PHARM REV CODE 255: Mod: PO | Performed by: NURSE PRACTITIONER

## 2020-07-10 RX ADMIN — IOHEXOL 100 ML: 350 INJECTION, SOLUTION INTRAVENOUS at 11:07

## 2020-07-10 NOTE — TELEPHONE ENCOUNTER
Needs urgent referral to cardiology and will need to see a cardiothoracic surgeon as well.  He has a growth of fatty tissue in his heart that is pressing on some of his blood vessels and he also has some plaque build up in the main artery of his heart.

## 2020-07-10 NOTE — TELEPHONE ENCOUNTER
Called and spoke to patient sister to schedule COVID testing and urine culture. Appointments scheduled.

## 2020-07-13 ENCOUNTER — TELEPHONE (OUTPATIENT)
Dept: FAMILY MEDICINE | Facility: CLINIC | Age: 75
End: 2020-07-13

## 2020-07-14 ENCOUNTER — LAB VISIT (OUTPATIENT)
Dept: LAB | Facility: HOSPITAL | Age: 75
End: 2020-07-14
Attending: UROLOGY
Payer: MEDICARE

## 2020-07-14 DIAGNOSIS — R39.9 LOWER URINARY TRACT SYMPTOMS (LUTS): ICD-10-CM

## 2020-07-14 PROCEDURE — 87086 URINE CULTURE/COLONY COUNT: CPT

## 2020-07-15 LAB — BACTERIA UR CULT: NO GROWTH

## 2020-07-18 ENCOUNTER — LAB VISIT (OUTPATIENT)
Dept: PRIMARY CARE CLINIC | Facility: CLINIC | Age: 75
End: 2020-07-18
Payer: MEDICARE

## 2020-07-18 DIAGNOSIS — R39.9 LOWER URINARY TRACT SYMPTOMS (LUTS): ICD-10-CM

## 2020-07-18 PROCEDURE — U0003 INFECTIOUS AGENT DETECTION BY NUCLEIC ACID (DNA OR RNA); SEVERE ACUTE RESPIRATORY SYNDROME CORONAVIRUS 2 (SARS-COV-2) (CORONAVIRUS DISEASE [COVID-19]), AMPLIFIED PROBE TECHNIQUE, MAKING USE OF HIGH THROUGHPUT TECHNOLOGIES AS DESCRIBED BY CMS-2020-01-R: HCPCS

## 2020-07-19 LAB — SARS-COV-2 RNA RESP QL NAA+PROBE: NOT DETECTED

## 2020-07-20 ENCOUNTER — TELEPHONE (OUTPATIENT)
Dept: UROLOGY | Facility: CLINIC | Age: 75
End: 2020-07-20

## 2020-07-20 NOTE — TELEPHONE ENCOUNTER
Patient sister called the office wanting to r/s procedure due to his cardiac problems. Informed sister that provider wanted to push procedure back until patient sees cardiologist and cardiothoracic surgeon. Sister verbalized understanding and procedure pushed back to 9/1.

## 2020-07-20 NOTE — TELEPHONE ENCOUNTER
Called patient to inform that cardiac clearance will be needed and procedure will have to be r/s to sept.1 until patient see cardiologist and cardiothoracic surgeon. No answer. LMOM to give the office a call back

## 2020-07-29 ENCOUNTER — OFFICE VISIT (OUTPATIENT)
Dept: PULMONOLOGY | Facility: CLINIC | Age: 75
End: 2020-07-29
Payer: MEDICARE

## 2020-07-29 DIAGNOSIS — Z78.9 NON-SMOKER: ICD-10-CM

## 2020-07-29 DIAGNOSIS — R93.89 ABNORMAL CT OF THE CHEST: ICD-10-CM

## 2020-07-29 DIAGNOSIS — J44.9 CHRONIC OBSTRUCTIVE PULMONARY DISEASE, UNSPECIFIED COPD TYPE: ICD-10-CM

## 2020-07-29 PROCEDURE — 99204 PR OFFICE/OUTPT VISIT, NEW, LEVL IV, 45-59 MIN: ICD-10-PCS | Mod: S$GLB,,, | Performed by: INTERNAL MEDICINE

## 2020-07-29 PROCEDURE — 99204 OFFICE O/P NEW MOD 45 MIN: CPT | Mod: S$GLB,,, | Performed by: INTERNAL MEDICINE

## 2020-07-29 NOTE — PROGRESS NOTES
New Office Visit/Consultation Note *    Patient Name: Theo Chen Jr.  MRN: [id number]  : 1945      Reason for visit: COPD    HPI:     2020 - Referred here for evaluation of COPD.  Had spirometry done at Dr Donovan's office showing mild/moderate obstruction improved by dilators.  He started on TRELEGY about 3 weeks ago and has noted improvement in his symptoms.  Also has albuterol inhaler but really have used it since starting TRELEGY.  Had nasal procedure done at Dr Donovan's office and has noted improvement in his cough.  Sputum is usually clear to milky white but this has also improved with his recent treatments.  No h/o hemoptysis.  Had recent CT chest (see report below).  He still has some SOB but also better on present meds.  He is overweight and does not exercise regularly.  He has no h/o sleep apnea, denies any significant snoring, no reported history apneas.  Denies excessive somnolence but does have decreased activity after noon.    Past Medical History    Past Medical History:   Diagnosis Date    COPD (chronic obstructive pulmonary disease)     Diabetes mellitus, type 2     Gastroenteritis     Seasonal allergic rhinitis due to pollen     Shingles        Past Surgical History    Past Surgical History:   Procedure Laterality Date    APPENDECTOMY      CYSTOSCOPY N/A 6/10/2020    Procedure: CYSTOSCOPY;  Surgeon: Hunter Guerrero Jr., MD;  Location: [facility] OR;  Service: Urology;  Laterality: N/A;    EYE SURGERY Left     TRANSRECTAL ULTRASOUND EXAMINATION N/A 6/10/2020    Procedure: ULTRASOUND, RECTAL APPROACH;  Surgeon: Hunter Guerrero Jr., MD;  Location: [facility] OR;  Service: Urology;  Laterality: N/A;       Medications      Current Outpatient Medications:     albuterol (PROVENTIL/VENTOLIN HFA) 90 mcg/actuation inhaler, Inhale 2 puffs into the lungs every 6 (six) hours as needed for Wheezing., Disp: 18 g, Rfl: 3    aspirin (ECOTRIN) 81 MG EC tablet, Take 1 tablet (81 mg total) by mouth 2  (two) times daily., Disp: , Rfl:     doxazosin (CARDURA) 2 MG tablet, Take 1 tablet (2 mg total) by mouth every evening., Disp: 90 tablet, Rfl: 1    finasteride (PROSCAR) 5 mg tablet, Take 1 tablet (5 mg total) by mouth once daily. (Patient taking differently: Take 5 mg by mouth every evening. ), Disp: 90 tablet, Rfl: 1    fluticasone propionate (FLONASE) 50 mcg/actuation nasal spray, 2 sprays (100 mcg total) by Each Nostril route once daily., Disp: 18 g, Rfl: 3    fluticasone-umeclidin-vilanter (TRELEGY ELLIPTA) 100-62.5-25 mcg DsDv, Inhale 1 puff into the lungs once daily., Disp: 1 each, Rfl: 3    glipiZIDE (GLUCOTROL) 10 MG tablet, Take 1 tablet (10 mg total) by mouth 2 (two) times daily., Disp: 180 tablet, Rfl: 1    guaiFENesin (MUCINEX) 600 mg 12 hr tablet, Take 1,200 mg by mouth 2 (two) times daily., Disp: , Rfl:     ipratropium (ATROVENT) 42 mcg (0.06 %) nasal spray, , Disp: , Rfl:     JANUVIA 100 mg Tab, TAKE ONE TABLET BY MOUTH ONCE DAILY , Disp: 90 tablet, Rfl: 1    lisinopriL 10 MG tablet, Take 1 tablet (10 mg total) by mouth once daily., Disp: 90 tablet, Rfl: 1    metFORMIN (GLUCOPHAGE) 1000 MG tablet, Take 1 tablet (1,000 mg total) by mouth 2 (two) times daily., Disp: 180 tablet, Rfl: 1    metoprolol tartrate (LOPRESSOR) 25 MG tablet, Take 1 tablet (25 mg total) by mouth 2 (two) times daily., Disp: 180 tablet, Rfl: 1    montelukast (SINGULAIR) 10 mg tablet, , Disp: , Rfl:     pravastatin (PRAVACHOL) 20 MG tablet, Take 1 tablet (20 mg total) by mouth nightly., Disp: 90 tablet, Rfl: 1    tamsulosin (FLOMAX) 0.4 mg Cap, TAKE TWO CAPSULES BY MOUTH ONCE DAILY. (Patient taking differently: every evening. 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   HENT: Negative for congestion.    Eyes: Negative for pain.   Respiratory: Positive for cough and shortness of breath. Negative for hemoptysis, sputum production, wheezing and stridor.    Cardiovascular: Negative for chest pain, palpitations, orthopnea, claudication, leg swelling and PND.   Gastrointestinal: Negative for abdominal pain, blood in stool, constipation, diarrhea, heartburn, nausea and vomiting.   Genitourinary: Negative for dysuria, frequency, hematuria and urgency.   Musculoskeletal: Negative for falls and myalgias.   Neurological: Negative for dizziness, tingling, tremors, sensory change, speech change, focal weakness, seizures, loss of consciousness, weakness and headaches.   Endo/Heme/Allergies: Negative for environmental allergies.   Psychiatric/Behavioral: Negative for depression, substance abuse and suicidal ideas. The patient is not nervous/anxious.        Physical Exam    Vitals:    07/29/20 0818   BP: (P) 116/78   Pulse: (P) 86   Temp: (P) 97 °F (36.1 °C)   SpO2: (P) 95%   Weight: (P) 92.5 kg (204 lb)       Physical Exam  Vitals signs and nursing note reviewed.   Constitutional:       General: He is not in acute distress.     Appearance: Normal appearance. He is well-developed. He is obese. He is not ill-appearing, toxic-appearing or diaphoretic.   HENT:      Head: Normocephalic and atraumatic.      Right Ear: External ear normal.      Left Ear: External ear normal.      Nose: Nose  normal. No congestion or rhinorrhea.   Eyes:      General: No scleral icterus.        Right eye: No discharge.         Left eye: No discharge.      Extraocular Movements: Extraocular movements intact.      Conjunctiva/sclera: Conjunctivae normal.      Pupils: Pupils are equal, round, and reactive to light.   Neck:      Musculoskeletal: Normal range of motion and neck supple.      Thyroid: No thyromegaly.      Vascular: No JVD.      Trachea: No tracheal deviation.   Cardiovascular:      Rate and Rhythm: Normal rate and regular rhythm.      Pulses: Normal pulses.      Heart sounds: Normal heart sounds. No murmur. No friction rub. No gallop.    Pulmonary:      Effort: Pulmonary effort is normal. No respiratory distress.      Breath sounds: Normal breath sounds. No stridor. No wheezing, rhonchi or rales.   Chest:      Chest wall: No tenderness.   Abdominal:      General: Bowel sounds are normal. There is no distension.      Palpations: Abdomen is soft.      Tenderness: There is no abdominal tenderness. There is no guarding.   Musculoskeletal: Normal range of motion.         General: No tenderness.      Right lower leg: No edema.      Left lower leg: No edema.   Lymphadenopathy:      Cervical: No cervical adenopathy.   Skin:     General: Skin is warm and dry.      Coloration: Skin is not jaundiced.      Findings: No bruising.   Neurological:      General: No focal deficit present.      Mental Status: He is alert and oriented to person, place, and time. Mental status is at baseline.      Cranial Nerves: No cranial nerve deficit.      Deep Tendon Reflexes: Reflexes are normal and symmetric.   Psychiatric:         Mood and Affect: Mood normal.         Behavior: Behavior normal.         Thought Content: Thought content normal.         Judgment: Judgment normal.         Labs    Lab Results   Component Value Date    WBC 8.0 06/02/2020    HGB 14.5 06/02/2020    HCT 45.1 06/02/2020     06/02/2020       Sodium   Date Value  Ref Range Status   06/02/2020 136 134 - 144 mmol/L Final     Potassium   Date Value Ref Range Status   06/02/2020 5.2 3.5 - 5.2 mmol/L Final     Chloride   Date Value Ref Range Status   06/02/2020 97 96 - 106 mmol/L Final     CO2   Date Value Ref Range Status   06/02/2020 22 20 - 29 mmol/L Final     Glucose   Date Value Ref Range Status   06/02/2020 207 (H) 65 - 99 mg/dL Final     BUN, Bld   Date Value Ref Range Status   06/02/2020 19 8 - 27 mg/dL Final     Creatinine   Date Value Ref Range Status   06/02/2020 1.53 (H) 0.76 - 1.27 mg/dL Final     Calcium   Date Value Ref Range Status   06/02/2020 9.6 8.6 - 10.2 mg/dL Final     Total Protein   Date Value Ref Range Status   06/02/2020 7.0 6.0 - 8.5 g/dL Final     Albumin   Date Value Ref Range Status   06/02/2020 4.6 3.7 - 4.7 g/dL Final     Total Bilirubin   Date Value Ref Range Status   06/02/2020 0.5 0.0 - 1.2 mg/dL Final     Alkaline Phosphatase   Date Value Ref Range Status   06/02/2020 67 39 - 117 IU/L Final     AST   Date Value Ref Range Status   06/02/2020 23 0 - 40 IU/L Final     ALT   Date Value Ref Range Status   06/02/2020 27 0 - 44 IU/L Final       Xrays    PROCEDURE:    CT CHEST WITH CONTRAST  dated  7/10/2020 11:03 AM     CLINICAL HISTORY:   Male 75 years of age.   Shortness of breath     TECHNIQUE: CT images were acquired from the thoracic inlet to the  diaphragm, after the intravenous administration of contrast.     COMPARISON:  None     FINDINGS:     There is well-defined homogeneous fatty attenuating mass involving the  interatrial septum with sparing of the fossa ovalis. Maximal  transverse dimensions on the axial series are 2.3 cm x 4 cm (image 92,  series 3. There is calcification of the left main and anterior  descending coronary artery. On coronal images generated at the  workstation, measures 6 cm in length. This exerts mass effect on the  right superior pulmonary vein and on the left atrium. The superior  vena cava is flattened.     The heart  "is not enlarged. There is no pericardial effusion. There is  moderate calcified atherosclerotic plaque of the mid aortic arch  through the descending thoracic aorta. Aorta caliber is normal.  Pulmonary arteries are normal.     There is moderate severity panlobular emphysema of the right lung and  mild panlobular emphysema of the left lung.     Airways are patent. There is no suspicious pulmonary nodule. There is  no acute pulmonary infiltrate. There is no pleural effusion or pleural  thickening. There is no lymphadenopathy. Partially visualized thyroid  gland is normal. The esophagus is normal.     There is no destructive osseous lesion or acute fracture.     IMPRESSION:        1. 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 This flattens the  superior vena cava and exerts mass effect on the left atrium and  superior left pulmonary vein. Cardiology or cardiothoracic surgery  consultation is recommended.  2. There is calcification of the left main/descending coronary artery.  3. There is right worse than left panlobular emphysema. Consultation  with pulmonary medicine is advised this has not occurred previously.     Electronically Signed by Nanette Santiago on 7/10/2020 12:09 PM    Impression/Plan    Problem List Items Addressed This Visit        Pulmonary    COPD (chronic obstructive pulmonary disease)     · Continue with TRELEGY and prn albuterol  · Will check complete PFT and 6 minute walk test  · RTC 1 month  · Had discussion with pt and family about the diagnosis - they had limited insight into this         Relevant Orders    Complete PFT with bronchodilator    Six Minute Walk Test to qualify for Home Oxygen       Other    Non-smoker     · Discussed with pt at length         Abnormal CT of the chest     · Has appointment with cardiology soon to evaluate  · Note "mass effect" on pulmonary vein and left atrial - ? Significance of this  · Will work up COPD               I have spent about 45 minutes with the patient " taking the history and examining the patient.  We have discussed the diagnoses and current plan and all questions have been answered.  We have discussed the follow up plan.  The patient and family (if present) know to contact the office with any questions they may have.        Raffaele Carrillo MD

## 2020-07-29 NOTE — ASSESSMENT & PLAN NOTE
· Continue with TRELEGY and prn albuterol  · Will check complete PFT and 6 minute walk test  · RTC 1 month  · Had discussion with pt and family about the diagnosis - they had limited insight into this

## 2020-07-29 NOTE — ASSESSMENT & PLAN NOTE
"· Has appointment with cardiology soon to evaluate  · Note "mass effect" on pulmonary vein and left atrial - ? Significance of this  · Will work up COPD  "

## 2020-09-04 ENCOUNTER — HOSPITAL ENCOUNTER (OUTPATIENT)
Dept: PREADMISSION TESTING | Facility: HOSPITAL | Age: 75
Discharge: HOME | End: 2020-09-04
Attending: INTERNAL MEDICINE
Payer: MEDICARE

## 2020-09-04 VITALS — BODY MASS INDEX: 28.7 KG/M2 | WEIGHT: 205 LBS | HEIGHT: 71 IN

## 2020-09-04 DIAGNOSIS — C91.00 PRE B-CELL ACUTE LYMPHOBLASTIC LEUKEMIA: Primary | ICD-10-CM

## 2020-09-04 DIAGNOSIS — Z01.812 PRE-PROCEDURE LAB EXAM: ICD-10-CM

## 2020-09-04 LAB
ALBUMIN SERPL BCP-MCNC: 4 G/DL (ref 3.5–5.2)
ALP SERPL-CCNC: 60 U/L (ref 55–135)
ALT SERPL W/O P-5'-P-CCNC: 23 U/L (ref 10–44)
ANION GAP SERPL CALC-SCNC: 8 MMOL/L (ref 8–16)
APTT PPP: 32.1 SEC (ref 23.6–33.3)
AST SERPL-CCNC: 20 U/L (ref 10–40)
BASOPHILS # BLD AUTO: 0.07 K/UL (ref 0–0.2)
BASOPHILS NFR BLD: 0.8 % (ref 0–1.9)
BILIRUB SERPL-MCNC: 0.9 MG/DL (ref 0.1–1)
BUN SERPL-MCNC: 26 MG/DL (ref 8–23)
CALCIUM SERPL-MCNC: 9 MG/DL (ref 8.7–10.5)
CHLORIDE SERPL-SCNC: 102 MMOL/L (ref 95–110)
CO2 SERPL-SCNC: 22 MMOL/L (ref 23–29)
CREAT SERPL-MCNC: 1.5 MG/DL (ref 0.5–1.4)
DIFFERENTIAL METHOD: ABNORMAL
EOSINOPHIL # BLD AUTO: 0.6 K/UL (ref 0–0.5)
EOSINOPHIL NFR BLD: 6.9 % (ref 0–8)
ERYTHROCYTE [DISTWIDTH] IN BLOOD BY AUTOMATED COUNT: 13.5 % (ref 11.5–14.5)
EST. GFR  (AFRICAN AMERICAN): 51.2 ML/MIN/1.73 M^2
EST. GFR  (NON AFRICAN AMERICAN): 44.3 ML/MIN/1.73 M^2
GLUCOSE SERPL-MCNC: 223 MG/DL (ref 70–110)
HCT VFR BLD AUTO: 43.5 % (ref 40–54)
HGB BLD-MCNC: 14.2 G/DL (ref 14–18)
IMM GRANULOCYTES # BLD AUTO: 0.02 K/UL (ref 0–0.04)
IMM GRANULOCYTES NFR BLD AUTO: 0.2 % (ref 0–0.5)
INR PPP: 1.1
LYMPHOCYTES # BLD AUTO: 1.1 K/UL (ref 1–4.8)
LYMPHOCYTES NFR BLD: 12.5 % (ref 18–48)
MAGNESIUM SERPL-MCNC: 1.9 MG/DL (ref 1.6–2.6)
MCH RBC QN AUTO: 28.6 PG (ref 27–31)
MCHC RBC AUTO-ENTMCNC: 32.6 G/DL (ref 32–36)
MCV RBC AUTO: 88 FL (ref 82–98)
MONOCYTES # BLD AUTO: 0.6 K/UL (ref 0.3–1)
MONOCYTES NFR BLD: 6.6 % (ref 4–15)
NEUTROPHILS # BLD AUTO: 6.3 K/UL (ref 1.8–7.7)
NEUTROPHILS NFR BLD: 73 % (ref 38–73)
NRBC BLD-RTO: 0 /100 WBC
PLATELET # BLD AUTO: 206 K/UL (ref 150–350)
PMV BLD AUTO: 11.6 FL (ref 9.2–12.9)
POTASSIUM SERPL-SCNC: 4.8 MMOL/L (ref 3.5–5.1)
PROT SERPL-MCNC: 6.8 G/DL (ref 6–8.4)
PROTHROMBIN TIME: 13.3 SEC (ref 10.6–14.8)
RBC # BLD AUTO: 4.97 M/UL (ref 4.6–6.2)
SODIUM SERPL-SCNC: 132 MMOL/L (ref 136–145)
WBC # BLD AUTO: 8.61 K/UL (ref 3.9–12.7)

## 2020-09-04 PROCEDURE — 85025 COMPLETE CBC W/AUTO DIFF WBC: CPT

## 2020-09-04 PROCEDURE — 93010 ELECTROCARDIOGRAM REPORT: CPT | Mod: ,,, | Performed by: INTERNAL MEDICINE

## 2020-09-04 PROCEDURE — 85730 THROMBOPLASTIN TIME PARTIAL: CPT

## 2020-09-04 PROCEDURE — 83735 ASSAY OF MAGNESIUM: CPT

## 2020-09-04 PROCEDURE — 93010 EKG 12-LEAD: ICD-10-PCS | Mod: ,,, | Performed by: INTERNAL MEDICINE

## 2020-09-04 PROCEDURE — 36415 COLL VENOUS BLD VENIPUNCTURE: CPT

## 2020-09-04 PROCEDURE — 85610 PROTHROMBIN TIME: CPT

## 2020-09-04 PROCEDURE — 80053 COMPREHEN METABOLIC PANEL: CPT

## 2020-09-04 PROCEDURE — 93005 ELECTROCARDIOGRAM TRACING: CPT | Performed by: INTERNAL MEDICINE

## 2020-09-07 ENCOUNTER — LAB VISIT (OUTPATIENT)
Dept: PRIMARY CARE CLINIC | Facility: CLINIC | Age: 75
End: 2020-09-07
Payer: MEDICARE

## 2020-09-07 DIAGNOSIS — Z01.818 PREOP TESTING: Primary | ICD-10-CM

## 2020-09-07 DIAGNOSIS — Z01.818 PREOP TESTING: ICD-10-CM

## 2020-09-07 PROCEDURE — U0003 INFECTIOUS AGENT DETECTION BY NUCLEIC ACID (DNA OR RNA); SEVERE ACUTE RESPIRATORY SYNDROME CORONAVIRUS 2 (SARS-COV-2) (CORONAVIRUS DISEASE [COVID-19]), AMPLIFIED PROBE TECHNIQUE, MAKING USE OF HIGH THROUGHPUT TECHNOLOGIES AS DESCRIBED BY CMS-2020-01-R: HCPCS

## 2020-09-07 NOTE — PROGRESS NOTES
.Pt presented for Pre-procedure drive thru testing. Patient identified using two patient identifiers prior to specimen collection. Questions answered prior to departure and education given.     Pt was scheduled on 9/6/20 but came on 9/7/20, reordered covid swab as asap, advised pt that it may not be back in time. Pt acknowledged.

## 2020-09-08 LAB — SARS-COV-2 RNA RESP QL NAA+PROBE: NOT DETECTED

## 2020-09-09 ENCOUNTER — CLINICAL SUPPORT (OUTPATIENT)
Dept: CARDIOLOGY | Facility: HOSPITAL | Age: 75
End: 2020-09-09
Attending: INTERNAL MEDICINE
Payer: MEDICARE

## 2020-09-09 ENCOUNTER — ANESTHESIA (OUTPATIENT)
Dept: CARDIOLOGY | Facility: HOSPITAL | Age: 75
End: 2020-09-09
Payer: MEDICARE

## 2020-09-09 ENCOUNTER — ANESTHESIA EVENT (OUTPATIENT)
Dept: CARDIOLOGY | Facility: HOSPITAL | Age: 75
End: 2020-09-09
Payer: MEDICARE

## 2020-09-09 ENCOUNTER — HOSPITAL ENCOUNTER (OUTPATIENT)
Facility: HOSPITAL | Age: 75
Discharge: HOME | End: 2020-09-09
Attending: INTERNAL MEDICINE | Admitting: INTERNAL MEDICINE
Payer: MEDICARE

## 2020-09-09 VITALS — WEIGHT: 205 LBS | HEIGHT: 71 IN | BODY MASS INDEX: 28.7 KG/M2

## 2020-09-09 VITALS
OXYGEN SATURATION: 94 % | HEART RATE: 93 BPM | DIASTOLIC BLOOD PRESSURE: 85 MMHG | RESPIRATION RATE: 21 BRPM | TEMPERATURE: 98 F | SYSTOLIC BLOOD PRESSURE: 143 MMHG

## 2020-09-09 DIAGNOSIS — R06.02 SOB (SHORTNESS OF BREATH): ICD-10-CM

## 2020-09-09 DIAGNOSIS — I25.10 ATHEROSCLEROSIS OF NATIVE CORONARY ARTERY OF NATIVE HEART WITHOUT ANGINA PECTORIS: ICD-10-CM

## 2020-09-09 DIAGNOSIS — R06.09 DYSPNEA ON EFFORT: ICD-10-CM

## 2020-09-09 DIAGNOSIS — I27.89 OTHER SPECIFIED PULMONARY HEART DISEASES: ICD-10-CM

## 2020-09-09 LAB
BSA FOR ECHO PROCEDURE: 2.16 M2
CATH EF ESTIMATED: 60 %
CATH EF QUANTITATIVE: 60 %
GLUCOSE SERPL-MCNC: 213 MG/DL (ref 70–110)
GLUCOSE SERPL-MCNC: 232 MG/DL (ref 70–110)

## 2020-09-09 PROCEDURE — 93460 R&L HRT ART/VENTRICLE ANGIO: CPT

## 2020-09-09 PROCEDURE — 25000003 PHARM REV CODE 250: Performed by: NURSE ANESTHETIST, CERTIFIED REGISTERED

## 2020-09-09 PROCEDURE — 37000008 HC ANESTHESIA 1ST 15 MINUTES: Performed by: INTERNAL MEDICINE

## 2020-09-09 PROCEDURE — C1760 CLOSURE DEV, VASC: HCPCS | Performed by: INTERNAL MEDICINE

## 2020-09-09 PROCEDURE — 25000003 PHARM REV CODE 250: Performed by: INTERNAL MEDICINE

## 2020-09-09 PROCEDURE — C1751 CATH, INF, PER/CENT/MIDLINE: HCPCS | Performed by: INTERNAL MEDICINE

## 2020-09-09 PROCEDURE — 63600175 PHARM REV CODE 636 W HCPCS: Performed by: INTERNAL MEDICINE

## 2020-09-09 PROCEDURE — 63600175 PHARM REV CODE 636 W HCPCS: Performed by: NURSE ANESTHETIST, CERTIFIED REGISTERED

## 2020-09-09 PROCEDURE — 37000009 HC ANESTHESIA EA ADD 15 MINS: Performed by: INTERNAL MEDICINE

## 2020-09-09 PROCEDURE — 27000671 HC TUBING MICROBORE EXT: Performed by: ANESTHESIOLOGY

## 2020-09-09 PROCEDURE — G0278 ILIAC ART ANGIO,CARDIAC CATH: HCPCS

## 2020-09-09 PROCEDURE — 96374 THER/PROPH/DIAG INJ IV PUSH: CPT | Mod: 59 | Performed by: INTERNAL MEDICINE

## 2020-09-09 PROCEDURE — C1725 CATH, TRANSLUMIN NON-LASER: HCPCS | Performed by: INTERNAL MEDICINE

## 2020-09-09 PROCEDURE — 93325 DOPPLER ECHO COLOR FLOW MAPG: CPT

## 2020-09-09 PROCEDURE — 99153 MOD SED SAME PHYS/QHP EA: CPT | Performed by: INTERNAL MEDICINE

## 2020-09-09 PROCEDURE — 25500020 PHARM REV CODE 255: Performed by: INTERNAL MEDICINE

## 2020-09-09 PROCEDURE — 75625 CONTRAST EXAM ABDOMINL AORTA: CPT | Mod: XS

## 2020-09-09 PROCEDURE — C1894 INTRO/SHEATH, NON-LASER: HCPCS | Performed by: INTERNAL MEDICINE

## 2020-09-09 PROCEDURE — C1769 GUIDE WIRE: HCPCS | Performed by: INTERNAL MEDICINE

## 2020-09-09 PROCEDURE — 63600175 PHARM REV CODE 636 W HCPCS: Performed by: ANESTHESIOLOGY

## 2020-09-09 PROCEDURE — 99152 MOD SED SAME PHYS/QHP 5/>YRS: CPT | Performed by: INTERNAL MEDICINE

## 2020-09-09 DEVICE — IMPLANTABLE DEVICE: Type: IMPLANTABLE DEVICE | Site: GROIN | Status: FUNCTIONAL

## 2020-09-09 RX ORDER — LIDOCAINE HYDROCHLORIDE 10 MG/ML
INJECTION, SOLUTION EPIDURAL; INFILTRATION; INTRACAUDAL; PERINEURAL
Status: DISCONTINUED | OUTPATIENT
Start: 2020-09-09 | End: 2020-09-09 | Stop reason: HOSPADM

## 2020-09-09 RX ORDER — IODIXANOL 320 MG/ML
INJECTION, SOLUTION INTRAVASCULAR
Status: DISCONTINUED | OUTPATIENT
Start: 2020-09-09 | End: 2020-09-09 | Stop reason: HOSPADM

## 2020-09-09 RX ORDER — SODIUM CHLORIDE 9 MG/ML
INJECTION, SOLUTION INTRAVENOUS CONTINUOUS
Status: DISCONTINUED | OUTPATIENT
Start: 2020-09-09 | End: 2020-09-09 | Stop reason: HOSPADM

## 2020-09-09 RX ORDER — PROPOFOL 10 MG/ML
VIAL (ML) INTRAVENOUS
Status: DISCONTINUED | OUTPATIENT
Start: 2020-09-09 | End: 2020-09-09

## 2020-09-09 RX ORDER — FENTANYL CITRATE 50 UG/ML
INJECTION, SOLUTION INTRAMUSCULAR; INTRAVENOUS
Status: DISCONTINUED | OUTPATIENT
Start: 2020-09-09 | End: 2020-09-09 | Stop reason: HOSPADM

## 2020-09-09 RX ORDER — SODIUM CHLORIDE 9 MG/ML
INJECTION, SOLUTION INTRAVENOUS CONTINUOUS PRN
Status: DISCONTINUED | OUTPATIENT
Start: 2020-09-09 | End: 2020-09-09

## 2020-09-09 RX ORDER — MIDAZOLAM HYDROCHLORIDE 1 MG/ML
INJECTION INTRAMUSCULAR; INTRAVENOUS
Status: DISCONTINUED | OUTPATIENT
Start: 2020-09-09 | End: 2020-09-09 | Stop reason: HOSPADM

## 2020-09-09 RX ADMIN — PROPOFOL 20 MG: 10 INJECTION, EMULSION INTRAVENOUS at 10:09

## 2020-09-09 RX ADMIN — PROPOFOL 50 MG: 10 INJECTION, EMULSION INTRAVENOUS at 09:09

## 2020-09-09 RX ADMIN — PROPOFOL 20 MG: 10 INJECTION, EMULSION INTRAVENOUS at 09:09

## 2020-09-09 RX ADMIN — SODIUM CHLORIDE: 900 INJECTION, SOLUTION INTRAVENOUS at 09:09

## 2020-09-09 RX ADMIN — PROPOFOL 30 MG: 10 INJECTION, EMULSION INTRAVENOUS at 09:09

## 2020-09-09 RX ADMIN — HUMAN INSULIN 4 UNITS: 100 INJECTION, SOLUTION SUBCUTANEOUS at 07:09

## 2020-09-09 RX ADMIN — SODIUM CHLORIDE: 0.9 INJECTION, SOLUTION INTRAVENOUS at 07:09

## 2020-09-09 NOTE — PLAN OF CARE
Patient ZOË procedure complete. Patient taken to cath lab for LHC and RHC. Cath lab to assume care.

## 2020-09-09 NOTE — INTERVAL H&P NOTE
The patient has been examined and the H&P has been reviewed:    I concur with the findings and no changes have occurred since H&P was written.    Anesthesia/Surgery risks, benefits and alternative options discussed and understood by patient/family.          Active Hospital Problems    Diagnosis  POA    Dyspnea on effort [R06.09]  Yes     Priority: High      Resolved Hospital Problems   No resolved problems to display.

## 2020-09-09 NOTE — Clinical Note
A dressing is applied to the incision. Gauze and tegaderm applied to access site post manual pressure and closure device

## 2020-09-09 NOTE — Clinical Note
Catheter is repositioned to the aorta. Angiography performed of the aorta. Angiography performed via power injection. Injection was 20 mL contrast at 12 mL/s. Power injection PSI was 700.. pigtail

## 2020-09-09 NOTE — TRANSFER OF CARE
Anesthesia Transfer of Care Note    Patient: Theo Chen Jr.    Procedure(s) Performed: Procedure(s) (LRB):  ECHOCARDIOGRAM,TRANSESOPHAGEAL (N/A)    Patient location: Other: [facility]    Anesthesia Type: MAC    Transport from OR: Transported from OR on room air with adequate spontaneous ventilation    Post pain: adequate analgesia    Post assessment: no apparent anesthetic complications and tolerated procedure well    Post vital signs: stable    Level of consciousness: awake, alert and oriented    Nausea/Vomiting: no nausea/vomiting    Complications: none    Transfer of care protocol was followedComments: Pt remaining in [facility] to be recovered. Pt AAO in NAD. VSS. Report to RN per protocol.         Last vitals:   Visit Vitals  BP (!) 127/58 (BP Location: Left arm, Patient Position: Lying)   Pulse 96   Temp 36.7 °C (98 °F) (Oral)   Resp 18   SpO2 99%

## 2020-09-09 NOTE — DISCHARGE INSTRUCTIONS
Post angiogram discharge care   You have a puncture site in you right groin   You can remove your current dressing in 24 hours and take a shower. Showers only for the next week. Do not sit in a bathtub or pool of water 7 days until the puncture site is healed.    Gently clean the puncture site daily using soap and water while showering, dry thoroughly and cover with a Band-Aid. Make sure the Band-Aid and puncture site stay clean and dry.   Inspect the site daily for tenderness, discharge or signs of infection.    Observe the puncture site for signs of bleeding, obvious oozing, formation on knot under skin, or bruising. If any of these were to occur you should immediately lie down flat and apply firm pressure at the insertion site for 10 minutes. If bleeding or swelling does not stop, call 911! Do NOT try to drive yourself to the hospital.    Drink at least 6-8 glasses of clear liquids durin the next 24 hours to flush out the dye.   You must not be alone for the next 24 hours.    You may experience soreness or tenderness that my last for 1 week.    You may have mild oozing from the puncture site and/or possible bruising that could last up to 2 weeks.   You may also have a small lump form that may last up to 6 weeks.        Activity   Do not drive or operate an automobile or hazardous machinery for 24 hours   Do note engage in sports for 1 week.   Limit lifting over 10 pounds for the nest week, or until puncture site heals.   Limit you activities for the next 48 hours. Avoid excessive bending or squatting.   You may resume normal activity in 2-3 days, let pain be your guide.    Call your Doctor immediatly if you experience any of the following:   Chest pain, palpitations, shortness of breath, excessive dizziness, fainting, nausea or vomiting.   Bowen signs of infection: redness, warmth, swelling, pain, drainage (other than a little blood on bandaide), chills, poorly healing puncture site, fever  greater than 101.0 F   Change in color, coolness, swelling, numbness, or pain to the involved extremity   Significant bleeding from the puncture site.

## 2020-09-09 NOTE — ANESTHESIA PREPROCEDURE EVALUATION
09/09/2020  Theo Chen Jr. is a 75 y.o., male.    Anesthesia Evaluation    I have reviewed the Patient Summary Reports.    I have reviewed the Nursing Notes. I have reviewed the NPO Status.   I have reviewed the Medications.     Review of Systems  Anesthesia Hx:  Denies Family Hx of Anesthesia complications.   Denies Personal Hx of Anesthesia complications.   Social:  Former Smoker, No Alcohol Use    Hematology/Oncology:  Hematology Normal   Oncology Normal     EENT/Dental:   Only 5 teeth, all lower and one broken.   Cardiovascular:   Hypertension hyperlipidemia BERGER    Pulmonary:   COPD    Renal/:   Chronic Renal Disease, CRI    Hepatic/GI:  Hepatic/GI Normal    Musculoskeletal:  Spine Disorders: lumbar    Endocrine:   Diabetes, type 2    Psych:  Psychiatric Normal           Physical Exam  General:  Well nourished    Airway/Jaw/Neck:  Airway Findings: Mouth Opening: Normal Tongue: Normal  Mallampati: III  TM Distance: Normal, at least 6 cm  Jaw/Neck Findings:  Neck ROM: Normal ROM      Dental:  Dental Findings: In tact   Chest/Lungs:  Chest/Lungs Findings: Clear to auscultation, Normal Respiratory Rate     Heart/Vascular:  Heart Findings: Rate: Normal  Rhythm: Regular Rhythm  Sounds: Normal  Heart murmur: negative       Mental Status:  Mental Status Findings:  Cooperative, Alert and Oriented         Anesthesia Plan  Type of Anesthesia, risks & benefits discussed:  Anesthesia Type:  MAC  Patient's Preference:   Intra-op Monitoring Plan: standard ASA monitors  Intra-op Monitoring Plan Comments:   Post Op Pain Control Plan:   Post Op Pain Control Plan Comments:   Induction:    Beta Blocker:         Informed Consent: Patient understands risks and agrees with Anesthesia plan.  Questions answered. Anesthesia consent signed with patient.  ASA Score: 3     Day of Surgery Review of History & Physical:         Anesthesia Plan Notes: Propofol only        Ready For Surgery From Anesthesia Perspective.

## 2020-09-09 NOTE — Clinical Note
Catheter is inserted into the left ventricle. Angiography performed of the LV in multiple views. Angiography performed via power injection. Injection was 12 mL contrast at 12 mL/s. Power injection PSI was 700.. Pigtail

## 2020-09-09 NOTE — ANESTHESIA POSTPROCEDURE EVALUATION
Anesthesia Post Evaluation    Patient: Theo Chen Jr.    Procedure(s) Performed: Procedure(s) (LRB):  ECHOCARDIOGRAM,TRANSESOPHAGEAL (N/A)    Final Anesthesia Type: MAC    Patient location during evaluation: floor  Patient participation: Yes- Able to Participate  Level of consciousness: awake and alert  Post-procedure vital signs: reviewed and stable  Pain management: adequate  Airway patency: patent    PONV status at discharge: No PONV  Anesthetic complications: no      Cardiovascular status: stable  Respiratory status: unassisted  Hydration status: euvolemic  Follow-up not needed.          Vitals Value Taken Time   /87 09/09/20 1030   Temp 36.7 °C (98 °F) 09/09/20 1006   Pulse  09/09/20 1037   Resp  09/09/20 1037   SpO2 97 % 09/09/20 1037   Vitals shown include unvalidated device data.      No case tracking events are documented in the log.      Pain/Magan Score: Magan Score: 10 (9/9/2020 10:47 AM)

## 2020-09-09 NOTE — DISCHARGE SUMMARY
Formerly [facility], later [facility]  Cardiology  Discharge Summary      Patient Name: Theo Chen Jr.  MRN: [id number]  Admission Date: 9/9/2020  Hospital Length of Stay: 0 days  Discharge Date and Time:  09/09/2020 11:42 AM  Attending Physician: Carlitos Jaime MD  Discharging Provider: Carlitos Jaime MD  Primary Care Physician: VETO Kennedy    HPI:  75-year-old male seen in clinic complaining of dyspnea on exertion with multiple risk factors for CAD including longstanding diabetes.  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  We were concerned about hemodynamic compromise with underlying secondary pulmonary hypertension and the CT scan also revealed calcification of the left main and LAD.  We therefore scheduled him for outpatient ZOË and diagnostic left and right heart catheterization.  He had minimal leg pain but high risk for PVD.    Procedure(s) (LRB):  CATHETERIZATION, HEART, BOTH LEFT AND RIGHT (N/A)     Indwelling Lines/Drains at time of discharge: none  Lines/Drains/Airways     None                 Hospital Course (synopsis of major diagnoses, care, treatment, and services provided during the course of the hospital stay):  Patient underwent diagnostic left heart catheterization which did not reveal any significant CAD with a left-dominant system.  He had normal left ventricular chamber size and systolic function EF 60% with normal diastolic function.  He had mild pulmonary hypertension with normal wedge pressure.  He has ZOË revealed an aneurysmal intra-atrial septum but no significant mass noted.  He had no significant valvular disease noted.  He did have a small distal abdominal aortic aneurysm just proximal to the iliac bifurcation with very ectatic iliac arteries bilaterally with evidence of PVD.  Runoff  was not performed.  He tolerated procedure well with no complications.  He was stable and ready for discharge after 4 hours of recovery and [facility].    Consults: none    Significant Diagnostic Studies: Angiography:  See op report dictation     Pending Diagnostic Studies:     Procedure Component Value Units Date/Time    Transesophageal echo (ZOË) [[id number]] Resulted: 09/09/20 1010    Order Status: Sent Lab Status: In process Updated: 09/09/20 1011     BSA 2.16 m2           Final Active Diagnoses:    Diagnosis Date Noted POA    Dyspnea on effort [R06.09] 09/09/2020 Yes      Problems Resolved During this Admission:       Discharged Condition: good    Follow Up: 1 week  Follow-up Information     Carlitos Jaime MD In 1 week.    Specialty: Cardiology  Contact information:  [address] [place] [address]  2ND FLOOR  [facility]  [place] [address]  [phone]                 Patient Instructions:      Call MD for:  severe uncontrolled pain     Call MD for:  redness, tenderness, or signs of infection (pain, swelling, redness, odor or green/yellow discharge around incision site)     Remove dressing in 24 hours     Activity as tolerated     Medications:  Reconciled Home Medications:      Medication List      CHANGE how you take these medications    finasteride 5 mg tablet  Commonly known as: PROSCAR  Take 1 tablet (5 mg total) by mouth once daily.  What changed: when to take this     glipiZIDE 10 MG tablet  Commonly known as: GLUCOTROL  Take 1 tablet (10 mg total) by mouth 2 (two) times daily.  What changed: additional instructions     metFORMIN 1000 MG tablet  Commonly known as: GLUCOPHAGE  Take 1 tablet (1,000 mg total) by mouth 2 (two) times daily.  What changed: how much to take     metoprolol tartrate 25 MG tablet  Commonly known as: LOPRESSOR  Take 1 tablet (25 mg total) by mouth 2 (two) times daily.  What changed: additional instructions     tamsulosin 0.4 mg Cap  Commonly known as: FLOMAX  TAKE TWO CAPSULES BY  MOUTH ONCE DAILY.  What changed:   · how much to take  · how to take this  · when to take this  · additional instructions        CONTINUE taking these medications    aspirin 81 MG EC tablet  Commonly known as: ECOTRIN  Take 1 tablet (81 mg total) by mouth 2 (two) times daily.     doxazosin 2 MG tablet  Commonly known as: CARDURA  Take 1 tablet (2 mg total) by mouth every evening.     fluticasone-umeclidin-vilanter 100-62.5-25 mcg Dsdv  Commonly known as: TRELEGY ELLIPTA  Inhale 1 puff into the lungs once daily.     ipratropium 42 mcg (0.06 %) nasal spray  Commonly known as: ATROVENT     JANUVIA 100 MG Tab  Generic drug: SITagliptin  TAKE ONE TABLET BY MOUTH ONCE DAILY     lisinopriL 10 MG tablet  Take 1 tablet (10 mg total) by mouth once daily.     montelukast 10 mg tablet  Commonly known as: SINGULAIR  Take 10 mg by mouth once daily.     pravastatin 20 MG tablet  Commonly known as: PRAVACHOL  Take 1 tablet (20 mg total) by mouth nightly.        ASK your doctor about these medications    fluticasone propionate 50 mcg/actuation nasal spray  Commonly known as: FLONASE  2 sprays (100 mcg total) by Each Nostril route once daily.            Time spent on the discharge of patient: <30 minutes    Carlitos Jaime MD  Cardiology  [facility]

## 2020-09-28 ENCOUNTER — TELEPHONE (OUTPATIENT)
Dept: PULMONOLOGY | Facility: CLINIC | Age: 75
End: 2020-09-28

## 2020-10-20 ENCOUNTER — HOSPITAL ENCOUNTER (OUTPATIENT)
Dept: PULMONOLOGY | Facility: HOSPITAL | Age: 75
Discharge: HOME | End: 2020-10-20
Attending: INTERNAL MEDICINE
Payer: MEDICARE

## 2020-10-20 VITALS — WEIGHT: 205 LBS | HEIGHT: 71 IN | BODY MASS INDEX: 28.7 KG/M2

## 2020-10-20 DIAGNOSIS — J44.9 CHRONIC OBSTRUCTIVE PULMONARY DISEASE, UNSPECIFIED COPD TYPE: ICD-10-CM

## 2020-10-20 PROCEDURE — 94727 GAS DIL/WSHOT DETER LNG VOL: CPT

## 2020-10-20 PROCEDURE — 94729 DIFFUSING CAPACITY: CPT

## 2020-10-20 PROCEDURE — 94010 BREATHING CAPACITY TEST: CPT

## 2020-10-20 PROCEDURE — 94060 EVALUATION OF WHEEZING: CPT

## 2020-10-20 PROCEDURE — 94618 PULMONARY STRESS TESTING: CPT

## 2020-10-20 NOTE — CARE UPDATE
"   10/20/20 0748   6MW Test   Ordering Provider Dr. Raffaele Carrillo   Diagnosis Shortness of Breath   Height 5' 11" (1.803 m)   Weight 93 kg (205 lb)   BMI (Calculated) 28.6   Predicted Distance 292.17   Patient Race    6MWT Status completed without stopping   Patient Reported Dyspnea;Other (Comment)  (back weakness)   Was O2 used? No   6MW Distance walked (feet) 1000 feet   Distance walked (meters) 304.8 meters   Did patient stop? Yes   How many times? 1   Stop Time 1 300 seconds   Restart Time 1 0 seconds   Did patient restart? No   Type of assistive device(s) used? no assistive devices   Is extra documentation required for this patient? Yes   Pre-Exercise   Oxygen Saturation 94 %   Supplemental Oxygen Room Air   Heart Rate 83 bpm   Lucas Dyspnea Rating  very light   Post Exercise   Oxygen Saturation 90 %   Supplemental Oxygen Room Air   Heart Rate 125 bpm   Lucas Dyspnea Rating  somewhat heavy   Recovery   Oxygen Saturation 94 %   Supplemental Oxygen Room Air   Heart Rate 107 bpm   Lucas Dyspnea Rating  moderate   Interpretation   Is procedure ready for interpretation? Yes   Did the patient stop or pause? No   Total Laps Walked 5   Final Partial Lap Distance (feet) 0 feet   Total Distance Feet (Calculated) 1000 feet   Total Distance Meters (Calculated) 304.8 meters   Predicted Distance Meters (Calculated) 505.67 meters   Percentage of Predicted (Calculated) 60.28   Peak VO2 (Calculated) 13.12   Mets 3.75   Comments This is a Non-Hypoxemic 6 min. walk. Patient did not qualify for Home Oxygen.   Oxygen Qualification   Oxygen Qualification? No     "

## 2020-10-22 ENCOUNTER — OFFICE VISIT (OUTPATIENT)
Dept: PULMONOLOGY | Facility: CLINIC | Age: 75
End: 2020-10-22
Payer: MEDICARE

## 2020-10-22 DIAGNOSIS — R93.89 ABNORMAL CT OF THE CHEST: ICD-10-CM

## 2020-10-22 DIAGNOSIS — J43.9 PULMONARY EMPHYSEMA, UNSPECIFIED EMPHYSEMA TYPE: ICD-10-CM

## 2020-10-22 DIAGNOSIS — R06.09 DYSPNEA ON EFFORT: ICD-10-CM

## 2020-10-22 DIAGNOSIS — Z78.9 NON-SMOKER: ICD-10-CM

## 2020-10-22 DIAGNOSIS — J44.9 CHRONIC OBSTRUCTIVE PULMONARY DISEASE, UNSPECIFIED COPD TYPE: ICD-10-CM

## 2020-10-22 PROCEDURE — 99214 PR OFFICE/OUTPT VISIT, EST, LEVL IV, 30-39 MIN: ICD-10-PCS | Mod: S$GLB,,, | Performed by: INTERNAL MEDICINE

## 2020-10-22 PROCEDURE — 99214 OFFICE O/P EST MOD 30 MIN: CPT | Mod: S$GLB,,, | Performed by: INTERNAL MEDICINE

## 2020-10-22 NOTE — ASSESSMENT & PLAN NOTE
Continue present medications.  Will refill medications as needed.  Instructed patient to contact us with any issues concerning their medications (cost, reactions, etc.).  Have discussed with patient about inciting conditions which may exacerbate their disease.  We did discuss possible new therapies or de-escalation of therapy (if appropriate).  Asked patient if they were interested in pursuing pulmonary rehabilitation.  All questions answered  RTC 6 months  Patient instructed that they are to call if symptoms change or new issues develop prior to their next visit.

## 2020-10-22 NOTE — PROGRESS NOTES
Office Visit Note *    Patient Name: Theo Chen Jr.  MRN: [id number]  : 1945      Reason for visit: COPD    HPI:     2020 - Referred here for evaluation of COPD.  Had spirometry done at Dr Donovan's office showing mild/moderate obstruction improved by dilators.  He started on TRELEGY about 3 weeks ago and has noted improvement in his symptoms.  Also has albuterol inhaler but really have used it since starting TRELEGY.  Had nasal procedure done at Dr Donovan's office and has noted improvement in his cough.  Sputum is usually clear to milky white but this has also improved with his recent treatments.  No h/o hemoptysis.  Had recent CT chest (see report below).  He still has some SOB but also better on present meds.  He is overweight and does not exercise regularly.  He has no h/o sleep apnea, denies any significant snoring, no reported history apneas.  Denies excessive somnolence but does have decreased activity after noon.    10/22/2020 - Here for follow up,  Pt is currently stable on present medications with no recent increases in their symptoms or use of rescue medications.  Since our last visit there have been no hospitalizations or ER visits for their respiratory issues and there does not seem to be anything to suggest unrecognized exacerbations.  I have reviewed the medical regimen and re-educated the pt on the role of rescue and controlling medications.  Inhaler technique and understanding seems adequate.  The patient reports no issues with any of there medications for their COPD.  Refills will be taken care of as needed.  All questions answered.  Does get occasional thrush with TRELEGY.  Patient has no known corona virus exposures and has been practicing social distancing.  We have discussed the virus and precautions and all questions have been answered.  Reviewed PFT with pt.    COPD Flowsheet    GOLD A    Last PFT - 10/2020  FEV1- 85 % DLCO - 41 %     + ICS/LABA/LAMA    + prn  ALBUTEROL    mMRC -  0 - SOB with strenuous exercise   - + 1 - SOB level ground, slight hill   -  2 - SOB walk slower or stop for breath level ground   -  3 - SOB at 100 yards or after few minutes   -  4 - SOB in house, dressing    Referral to PULMONARY REHABILITATION -  NO    Tested for alpha-1-antitrypsin - NO  Result - na    Flu and Pneumonia Vaccination    I have recommended that the patient get this years influenza vaccine.  We discussed the risks and benefits of this treatment.  Got 20/21 vaccine    I reviewed patient's current pneumonia vaccine status.  Patient is current.  We have discussed the current guidelines and recommendations for pneumonia vaccination.        Past Medical History    Past Medical History:   Diagnosis Date    COPD (chronic obstructive pulmonary disease)     Diabetes mellitus, type 2     Gastroenteritis     Seasonal allergic rhinitis due to pollen     Shingles        Past Surgical History    Past Surgical History:   Procedure Laterality Date    ABDOMINAL AORTOGRAPHY N/A 9/9/2020    Procedure: Aortogram, Abdominal;  Surgeon: Carlitos Jaime MD;  Location: [facility] CATH/EP LAB;  Service: Cardiology;  Laterality: N/A;    APPENDECTOMY      CATHETERIZATION OF BOTH LEFT AND RIGHT HEART N/A 9/9/2020    Procedure: CATHETERIZATION, HEART, BOTH LEFT AND RIGHT;  Surgeon: Carlitos Jaime MD;  Location: [facility] CATH/EP LAB;  Service: Cardiology;  Laterality: N/A;    CYSTOSCOPY N/A 6/10/2020    Procedure: CYSTOSCOPY;  Surgeon: Hunter Guerrero Jr., MD;  Location: [facility] OR;  Service: Urology;  Laterality: N/A;    EYE SURGERY Left     TRANSRECTAL ULTRASOUND EXAMINATION N/A 6/10/2020    Procedure: ULTRASOUND, RECTAL APPROACH;  Surgeon: Hunter Guerrero Jr., MD;  Location: [facility] OR;  Service: Urology;  Laterality: N/A;       Medications      Current Outpatient Medications:     aspirin (ECOTRIN) 81 MG EC tablet, Take 1 tablet (81 mg total) by mouth 2 (two) times daily., Disp: , Rfl:     doxazosin  (CARDURA) 2 MG tablet, Take 1 tablet (2 mg total) by mouth every evening., Disp: 90 tablet, Rfl: 1    finasteride (PROSCAR) 5 mg tablet, Take 1 tablet (5 mg total) by mouth once daily. (Patient taking differently: Take 5 mg by mouth every evening. ), Disp: 90 tablet, Rfl: 1    fluticasone propionate (FLONASE) 50 mcg/actuation nasal spray, 2 sprays (100 mcg total) by Each Nostril route once daily., Disp: 18 g, Rfl: 3    fluticasone-umeclidin-vilanter (TRELEGY ELLIPTA) 100-62.5-25 mcg DsDv, Inhale 1 puff into the lungs once daily., Disp: 1 each, Rfl: 3    glipiZIDE (GLUCOTROL) 10 MG tablet, Take 1 tablet (10 mg total) by mouth 2 (two) times daily. (Patient taking differently: Take 10 mg by mouth 2 (two) times daily. 1/2 tab bid), Disp: 180 tablet, Rfl: 1    ipratropium (ATROVENT) 42 mcg (0.06 %) nasal spray, , Disp: , Rfl:     JANUVIA 100 mg Tab, TAKE ONE TABLET BY MOUTH ONCE DAILY , Disp: 90 tablet, Rfl: 1    lisinopriL 10 MG tablet, Take 1 tablet (10 mg total) by mouth once daily., Disp: 90 tablet, Rfl: 1    metFORMIN (GLUCOPHAGE) 1000 MG tablet, Take 1 tablet (1,000 mg total) by mouth 2 (two) times daily. (Patient taking differently: Take 500 mg by mouth 2 (two) times daily. ), Disp: 180 tablet, Rfl: 1    metoprolol tartrate (LOPRESSOR) 25 MG tablet, Take 1 tablet (25 mg total) by mouth 2 (two) times daily. (Patient taking differently: Take 25 mg by mouth 2 (two) times daily. 1/2 tab bid), Disp: 180 tablet, Rfl: 1    montelukast (SINGULAIR) 10 mg tablet, Take 10 mg by mouth once daily. , Disp: , Rfl:     pravastatin (PRAVACHOL) 20 MG tablet, Take 1 tablet (20 mg total) by mouth nightly., Disp: 90 tablet, Rfl: 1    tamsulosin (FLOMAX) 0.4 mg Cap, TAKE TWO CAPSULES BY MOUTH ONCE DAILY. (Patient taking differently: every evening. 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   HENT: Negative for congestion.    Eyes: Negative for pain.   Respiratory: Positive for cough and shortness of breath. Negative for hemoptysis, sputum production, wheezing and stridor.    Cardiovascular: Negative for chest pain, palpitations, orthopnea, claudication, leg swelling and PND.   Gastrointestinal: Negative for abdominal pain, blood in stool, constipation, diarrhea, heartburn, nausea and vomiting.   Genitourinary: Negative for dysuria, frequency, hematuria and urgency.   Musculoskeletal: Negative for falls and myalgias.   Neurological: Negative for dizziness, tingling, tremors, sensory change, speech change, focal weakness, seizures, loss of consciousness, weakness and headaches.   Endo/Heme/Allergies: Negative for environmental allergies.   Psychiatric/Behavioral: Negative for depression, substance abuse and suicidal ideas. The patient is not nervous/anxious.        Physical Exam    Vitals:    10/22/20 0903   BP: (P) 136/72   BP Location: (P) Left arm   Patient Position: (P) Sitting   BP Method: (P) Large (Manual)   Pulse: (P) 81   Temp: (P) 97.3 °F (36.3 °C)   TempSrc: (P) Temporal   SpO2: (!) (P) 93%   Weight: (P) 94.3 kg (208 lb)       Physical Exam  Vitals signs and nursing note reviewed.   Constitutional:       General: He is not in acute distress.     Appearance:  Normal appearance. He is well-developed. He is obese. He is not ill-appearing, toxic-appearing or diaphoretic.   HENT:      Head: Normocephalic and atraumatic.      Right Ear: External ear normal.      Left Ear: External ear normal.      Nose: Nose normal. No congestion or rhinorrhea.   Eyes:      General: No scleral icterus.        Right eye: No discharge.         Left eye: No discharge.      Extraocular Movements: Extraocular movements intact.      Conjunctiva/sclera: Conjunctivae normal.      Pupils: Pupils are equal, round, and reactive to light.   Neck:      Musculoskeletal: Normal range of motion and neck supple.      Thyroid: No thyromegaly.      Vascular: No JVD.      Trachea: No tracheal deviation.   Cardiovascular:      Rate and Rhythm: Normal rate and regular rhythm.      Pulses: Normal pulses.      Heart sounds: Normal heart sounds. No murmur. No friction rub. No gallop.    Pulmonary:      Effort: Pulmonary effort is normal. No respiratory distress.      Breath sounds: Normal breath sounds. No stridor. No wheezing, rhonchi or rales.   Chest:      Chest wall: No tenderness.   Abdominal:      General: Bowel sounds are normal. There is no distension.      Palpations: Abdomen is soft.      Tenderness: There is no abdominal tenderness. There is no guarding.   Musculoskeletal: Normal range of motion.         General: No tenderness.      Right lower leg: No edema.      Left lower leg: No edema.   Lymphadenopathy:      Cervical: No cervical adenopathy.   Skin:     General: Skin is warm and dry.      Coloration: Skin is not jaundiced.      Findings: No bruising.   Neurological:      General: No focal deficit present.      Mental Status: He is alert and oriented to person, place, and time. Mental status is at baseline.      Cranial Nerves: No cranial nerve deficit.      Deep Tendon Reflexes: Reflexes are normal and symmetric.   Psychiatric:         Mood and Affect: Mood normal.         Behavior: Behavior normal.          Thought Content: Thought content normal.         Judgment: Judgment normal.         Labs    Lab Results   Component Value Date    WBC 8.61 09/04/2020    HGB 14.2 09/04/2020    HCT 43.5 09/04/2020     09/04/2020       Sodium   Date Value Ref Range Status   09/04/2020 132 (L) 136 - 145 mmol/L Final     Potassium   Date Value Ref Range Status   09/04/2020 4.8 3.5 - 5.1 mmol/L Final     Chloride   Date Value Ref Range Status   09/04/2020 102 95 - 110 mmol/L Final     CO2   Date Value Ref Range Status   09/04/2020 22 (L) 23 - 29 mmol/L Final     Glucose   Date Value Ref Range Status   09/04/2020 223 (H) 70 - 110 mg/dL Final     BUN, Bld   Date Value Ref Range Status   09/04/2020 26 (H) 8 - 23 mg/dL Final     Creatinine   Date Value Ref Range Status   09/04/2020 1.5 (H) 0.5 - 1.4 mg/dL Final     Calcium   Date Value Ref Range Status   09/04/2020 9.0 8.7 - 10.5 mg/dL Final     Total Protein   Date Value Ref Range Status   09/04/2020 6.8 6.0 - 8.4 g/dL Final     Albumin   Date Value Ref Range Status   09/04/2020 4.0 3.5 - 5.2 g/dL Final     Total Bilirubin   Date Value Ref Range Status   09/04/2020 0.9 0.1 - 1.0 mg/dL Final     Comment:     For infants and newborns, interpretation of results should be based  on gestational age, weight and in agreement with clinical  observations.  Premature Infant recommended reference ranges:  Up to 24 hours.............<8.0 mg/dL  Up to 48 hours............<12.0 mg/dL  3-5 days..................<15.0 mg/dL  6-29 days.................<15.0 mg/dL       Alkaline Phosphatase   Date Value Ref Range Status   09/04/2020 60 55 - 135 U/L Final     AST   Date Value Ref Range Status   09/04/2020 20 10 - 40 U/L Final     ALT   Date Value Ref Range Status   09/04/2020 23 10 - 44 U/L Final     Anion Gap   Date Value Ref Range Status   09/04/2020 8 8 - 16 mmol/L Final       Xrays    PROCEDURE:    CT CHEST WITH CONTRAST  dated  7/10/2020 11:03 AM     CLINICAL HISTORY:   Male 75 years of age.    Shortness of breath     TECHNIQUE: CT images were acquired from the thoracic inlet to the  diaphragm, after the intravenous administration of contrast.     COMPARISON:  None     FINDINGS:     There is well-defined homogeneous fatty attenuating mass involving the  interatrial septum with sparing of the fossa ovalis. Maximal  transverse dimensions on the axial series are 2.3 cm x 4 cm (image 92,  series 3. There is calcification of the left main and anterior  descending coronary artery. On coronal images generated at the  workstation, measures 6 cm in length. This exerts mass effect on the  right superior pulmonary vein and on the left atrium. The superior  vena cava is flattened.     The heart is not enlarged. There is no pericardial effusion. There is  moderate calcified atherosclerotic plaque of the mid aortic arch  through the descending thoracic aorta. Aorta caliber is normal.  Pulmonary arteries are normal.     There is moderate severity panlobular emphysema of the right lung and  mild panlobular emphysema of the left lung.     Airways are patent. There is no suspicious pulmonary nodule. There is  no acute pulmonary infiltrate. There is no pleural effusion or pleural  thickening. There is no lymphadenopathy. Partially visualized thyroid  gland is normal. The esophagus is normal.     There is no destructive osseous lesion or acute fracture.     IMPRESSION:        1. 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 This flattens the  superior vena cava and exerts mass effect on the left atrium and  superior left pulmonary vein. Cardiology or cardiothoracic surgery  consultation is recommended.  2. There is calcification of the left main/descending coronary artery.  3. There is right worse than left panlobular emphysema. Consultation  with pulmonary medicine is advised this has not occurred previously.     Electronically Signed by Nanette Santiago on 7/10/2020 12:09 PM    Impression/Plan    Problem List Items Addressed  This Visit        Pulmonary    COPD (chronic obstructive pulmonary disease)     Continue present medications.  Will refill medications as needed.  Instructed patient to contact us with any issues concerning their medications (cost, reactions, etc.).  Have discussed with patient about inciting conditions which may exacerbate their disease.  We did discuss possible new therapies or de-escalation of therapy (if appropriate).  Asked patient if they were interested in pursuing pulmonary rehabilitation.  All questions answered  RTC 6 months  Patient instructed that they are to call if symptoms change or new issues develop prior to their next visit.            Other    Non-smoker     · Aware          Abnormal CT of the chest     · Will follow         Dyspnea on effort     · Seems better on present regimen                   Raffaele Carrillo MD

## 2020-10-24 ENCOUNTER — PATIENT MESSAGE (OUTPATIENT)
Dept: PULMONOLOGY | Facility: CLINIC | Age: 75
End: 2020-10-24

## 2020-10-26 RX ORDER — FLUCONAZOLE 100 MG/1
100 TABLET ORAL DAILY
Qty: 7 TABLET | Refills: 2 | Status: SHIPPED | OUTPATIENT
Start: 2020-10-26 | End: 2020-12-30

## 2020-10-26 RX ORDER — FLUCONAZOLE 100 MG/1
100 TABLET ORAL DAILY
Qty: 7 TABLET | Refills: 2 | OUTPATIENT
Start: 2020-10-26

## 2020-11-10 ENCOUNTER — PATIENT MESSAGE (OUTPATIENT)
Dept: UROLOGY | Facility: CLINIC | Age: 75
End: 2020-11-10

## 2020-11-11 ENCOUNTER — PATIENT MESSAGE (OUTPATIENT)
Dept: FAMILY MEDICINE | Facility: CLINIC | Age: 75
End: 2020-11-11

## 2020-11-11 ENCOUNTER — PATIENT MESSAGE (OUTPATIENT)
Dept: UROLOGY | Facility: CLINIC | Age: 75
End: 2020-11-11

## 2020-11-11 DIAGNOSIS — R35.1 NOCTURIA: ICD-10-CM

## 2020-11-19 RX ORDER — FINASTERIDE 5 MG/1
5 TABLET, FILM COATED ORAL NIGHTLY
Qty: 30 TABLET | Refills: 1 | Status: SHIPPED | OUTPATIENT
Start: 2020-11-19 | End: 2020-12-30 | Stop reason: SDUPTHER

## 2020-11-22 ENCOUNTER — PATIENT MESSAGE (OUTPATIENT)
Dept: FAMILY MEDICINE | Facility: CLINIC | Age: 75
End: 2020-11-22

## 2020-11-22 DIAGNOSIS — E11.22 TYPE 2 DIABETES MELLITUS WITH STAGE 3 CHRONIC KIDNEY DISEASE, WITHOUT LONG-TERM CURRENT USE OF INSULIN: ICD-10-CM

## 2020-11-22 DIAGNOSIS — I10 ESSENTIAL HYPERTENSION: ICD-10-CM

## 2020-11-22 DIAGNOSIS — N18.30 TYPE 2 DIABETES MELLITUS WITH STAGE 3 CHRONIC KIDNEY DISEASE, WITHOUT LONG-TERM CURRENT USE OF INSULIN: ICD-10-CM

## 2020-11-23 RX ORDER — MONTELUKAST SODIUM 10 MG/1
10 TABLET ORAL DAILY
Qty: 90 TABLET | Refills: 0 | Status: SHIPPED | OUTPATIENT
Start: 2020-11-23 | End: 2020-12-30 | Stop reason: SDUPTHER

## 2020-11-23 RX ORDER — LISINOPRIL 10 MG/1
10 TABLET ORAL DAILY
Qty: 90 TABLET | Refills: 0 | Status: SHIPPED | OUTPATIENT
Start: 2020-11-23 | End: 2020-12-30 | Stop reason: SDUPTHER

## 2020-12-30 ENCOUNTER — OFFICE VISIT (OUTPATIENT)
Dept: FAMILY MEDICINE | Facility: CLINIC | Age: 75
End: 2020-12-30
Payer: MEDICARE

## 2020-12-30 VITALS
OXYGEN SATURATION: 96 % | HEIGHT: 71 IN | BODY MASS INDEX: 29.66 KG/M2 | TEMPERATURE: 98 F | WEIGHT: 211.88 LBS | HEART RATE: 84 BPM

## 2020-12-30 DIAGNOSIS — N18.32 TYPE 2 DIABETES MELLITUS WITH STAGE 3B CHRONIC KIDNEY DISEASE, WITHOUT LONG-TERM CURRENT USE OF INSULIN: Primary | ICD-10-CM

## 2020-12-30 DIAGNOSIS — E78.5 DYSLIPIDEMIA: ICD-10-CM

## 2020-12-30 DIAGNOSIS — E11.22 TYPE 2 DIABETES MELLITUS WITH STAGE 3B CHRONIC KIDNEY DISEASE, WITHOUT LONG-TERM CURRENT USE OF INSULIN: Primary | ICD-10-CM

## 2020-12-30 DIAGNOSIS — I10 ESSENTIAL HYPERTENSION: ICD-10-CM

## 2020-12-30 DIAGNOSIS — B35.4 TINEA CORPORIS: ICD-10-CM

## 2020-12-30 DIAGNOSIS — J43.9 PULMONARY EMPHYSEMA, UNSPECIFIED EMPHYSEMA TYPE: ICD-10-CM

## 2020-12-30 DIAGNOSIS — R35.1 NOCTURIA: ICD-10-CM

## 2020-12-30 LAB — HBA1C MFR BLD: 7 %

## 2020-12-30 PROCEDURE — 99214 OFFICE O/P EST MOD 30 MIN: CPT | Performed by: NURSE PRACTITIONER

## 2020-12-30 PROCEDURE — 99214 PR OFFICE/OUTPT VISIT, EST, LEVL IV, 30-39 MIN: ICD-10-PCS | Mod: S$PBB,,, | Performed by: NURSE PRACTITIONER

## 2020-12-30 PROCEDURE — 83036 HEMOGLOBIN GLYCOSYLATED A1C: CPT | Mod: PBBFAC | Performed by: NURSE PRACTITIONER

## 2020-12-30 PROCEDURE — 99214 OFFICE O/P EST MOD 30 MIN: CPT | Mod: S$PBB,,, | Performed by: NURSE PRACTITIONER

## 2020-12-30 RX ORDER — GLIPIZIDE 10 MG/1
5 TABLET ORAL 2 TIMES DAILY
Qty: 90 TABLET | Refills: 3 | Status: SHIPPED | OUTPATIENT
Start: 2020-12-30 | End: 2021-10-05 | Stop reason: SDUPTHER

## 2020-12-30 RX ORDER — FLUCONAZOLE 100 MG/1
100 TABLET ORAL DAILY
Qty: 7 TABLET | Refills: 2 | Status: CANCELLED | OUTPATIENT
Start: 2020-12-30

## 2020-12-30 RX ORDER — PRAVASTATIN SODIUM 20 MG/1
20 TABLET ORAL NIGHTLY
Qty: 90 TABLET | Refills: 1 | Status: SHIPPED | OUTPATIENT
Start: 2020-12-30 | End: 2021-06-30 | Stop reason: SDUPTHER

## 2020-12-30 RX ORDER — LISINOPRIL 10 MG/1
10 TABLET ORAL DAILY
Qty: 90 TABLET | Refills: 3 | Status: SHIPPED | OUTPATIENT
Start: 2020-12-30 | End: 2021-02-27 | Stop reason: SDUPTHER

## 2020-12-30 RX ORDER — MONTELUKAST SODIUM 10 MG/1
10 TABLET ORAL DAILY
Qty: 90 TABLET | Refills: 1 | Status: SHIPPED | OUTPATIENT
Start: 2020-12-30 | End: 2021-06-30 | Stop reason: SDUPTHER

## 2020-12-30 RX ORDER — TAMSULOSIN HYDROCHLORIDE 0.4 MG/1
0.4 CAPSULE ORAL NIGHTLY
Qty: 90 CAPSULE | Refills: 1 | Status: SHIPPED | OUTPATIENT
Start: 2020-12-30 | End: 2021-06-30 | Stop reason: SDUPTHER

## 2020-12-30 RX ORDER — METOPROLOL TARTRATE 25 MG/1
12.5 TABLET, FILM COATED ORAL 2 TIMES DAILY
Qty: 90 TABLET | Refills: 3 | Status: SHIPPED | OUTPATIENT
Start: 2020-12-30 | End: 2022-01-03 | Stop reason: SDUPTHER

## 2020-12-30 RX ORDER — FINASTERIDE 5 MG/1
5 TABLET, FILM COATED ORAL NIGHTLY
Qty: 90 TABLET | Refills: 1 | Status: SHIPPED | OUTPATIENT
Start: 2020-12-30 | End: 2021-06-30 | Stop reason: SDUPTHER

## 2020-12-30 RX ORDER — DOXAZOSIN 2 MG/1
2 TABLET ORAL NIGHTLY
Qty: 90 TABLET | Refills: 1 | Status: SHIPPED | OUTPATIENT
Start: 2020-12-30 | End: 2021-06-30 | Stop reason: SDUPTHER

## 2020-12-30 RX ORDER — FLUCONAZOLE 150 MG/1
TABLET ORAL
Qty: 3 TABLET | Refills: 1 | Status: SHIPPED | OUTPATIENT
Start: 2020-12-30 | End: 2021-04-22 | Stop reason: SDUPTHER

## 2020-12-30 RX ORDER — METFORMIN HYDROCHLORIDE 1000 MG/1
500 TABLET ORAL 2 TIMES DAILY
Qty: 90 TABLET | Refills: 3 | Status: SHIPPED | OUTPATIENT
Start: 2020-12-30 | End: 2021-10-05 | Stop reason: SDUPTHER

## 2020-12-31 NOTE — PROGRESS NOTES
SUBJECTIVE:      Patient ID: Theo Chen Jr. is a 75 y.o. male.    Chief Complaint: Follow-up (DM)    Former patient of RONNIE Scales, unknown to this provider, presents for 6 month diabetic follow-up - today's A1c 7.0 down from 7.6 in June    Sister, present at visit, contributing minimally to history questions    Discussed outstanding health maintenance - states he completed zoster, flu, and pneumonia at [facility]  He presents for his follow-up diabetic visit. He has type 2 diabetes mellitus. No MedicAlert identification noted. His disease course has been improving. Pertinent negatives for hypoglycemia include no confusion, dizziness, headaches, nervousness/anxiousness or pallor. Pertinent negatives for diabetes include no chest pain, no fatigue, no polydipsia, no polyuria and no weakness. There are no hypoglycemic complications. Symptoms are stable. There are no diabetic complications. Risk factors for coronary artery disease include diabetes mellitus, dyslipidemia, family history, hypertension, male sex, obesity and sedentary lifestyle. Current diabetic treatment includes oral agent (triple therapy). He is compliant with treatment all of the time. His weight is fluctuating minimally. He is following a generally healthy diet. He has not had a previous visit with a dietitian. He rarely participates in exercise. An ACE inhibitor/angiotensin II receptor blocker is being taken.   Hypertension  This is a chronic problem. The current episode started more than 1 month ago. The problem is controlled. Associated symptoms include shortness of breath (At times, but improved on Trelegy). Pertinent negatives include no chest pain, headaches or neck pain. Risk factors for coronary artery disease include diabetes mellitus, dyslipidemia, family history, male gender and sedentary lifestyle. Past treatments include ACE inhibitors, alpha 1 blockers and beta blockers. The current treatment provides  significant improvement. Compliance problems include diet and exercise.    Rash  This is a recurrent problem. The current episode started 1 to 4 weeks ago. The affected locations include the left foot and right foot. The rash is characterized by itchiness, dryness, peeling and redness. Associated symptoms include shortness of breath (At times, but improved on Trelegy). Pertinent negatives include no congestion, cough, diarrhea, eye pain, fatigue, fever, sore throat or vomiting. Treatments tried: diflucan. The treatment provided significant relief.       Past Surgical History:   Procedure Laterality Date    ABDOMINAL AORTOGRAPHY N/A 9/9/2020    Procedure: Aortogram, Abdominal;  Surgeon: Carlitos Jaime MD;  Location: [facility] [place] CATH/EP LAB;  Service: Cardiology;  Laterality: N/A;    APPENDECTOMY      CATHETERIZATION OF BOTH LEFT AND RIGHT HEART N/A 9/9/2020    Procedure: CATHETERIZATION, HEART, BOTH LEFT AND RIGHT;  Surgeon: Carlitos Jaime MD;  Location: [facility] [place] CATH/EP LAB;  Service: Cardiology;  Laterality: N/A;    CYSTOSCOPY N/A 6/10/2020    Procedure: CYSTOSCOPY;  Surgeon: Hunter Guerrero Jr., MD;  Location: [facility] OR;  Service: Urology;  Laterality: N/A;    EYE SURGERY Left     TRANSRECTAL ULTRASOUND EXAMINATION N/A 6/10/2020    Procedure: ULTRASOUND, RECTAL APPROACH;  Surgeon: Hunter Guerrero Jr., MD;  Location: [facility];  Service: Urology;  Laterality: N/A;     Family History   Problem Relation Age of Onset    Hypertension Mother     Heart disease Father       Social History     Socioeconomic History    Marital status:      Spouse name: Not on file    Number of children: Not on file    Years of education: Not on file    Highest education level: Not on file   Occupational History    Occupation: retired    Social Needs    Financial resource strain: Not on file    Food insecurity     Worry: Not on file     Inability: Not on file    Transportation needs     Medical: Not on file     Non-medical: Not on  file   Tobacco Use    Smoking status: Former Smoker     Packs/day: 2.00     Years: 45.00     Pack years: 90.00     Quit date:      Years since quittin.0    Smokeless tobacco: Never Used   Substance and Sexual Activity    Alcohol use: No    Drug use: No    Sexual activity: Not on file   Lifestyle    Physical activity     Days per week: Not on file     Minutes per session: Not on file    Stress: Not on file   Relationships    Social connections     Talks on phone: Not on file     Gets together: Not on file     Attends Hoahaoism service: Not on file     Active member of club or organization: Not on file     Attends meetings of clubs or organizations: Not on file     Relationship status: Not on file   Other Topics Concern    Not on file   Social History Narrative    Not on file     Current Outpatient Medications   Medication Sig Dispense Refill    aspirin (ECOTRIN) 81 MG EC tablet Take 1 tablet (81 mg total) by mouth 2 (two) times daily.      doxazosin (CARDURA) 2 MG tablet Take 1 tablet (2 mg total) by mouth every evening. 90 tablet 1    finasteride (PROSCAR) 5 mg tablet Take 1 tablet (5 mg total) by mouth every evening. 90 tablet 1    fluticasone propionate (FLONASE) 50 mcg/actuation nasal spray 2 sprays (100 mcg total) by Each Nostril route once daily. 18 g 3    fluticasone-umeclidin-vilanter (TRELEGY ELLIPTA) 100-62.5-25 mcg DsDv Inhale 1 puff into the lungs once daily. 1 each 3    glipiZIDE (GLUCOTROL) 10 MG tablet Take 0.5 tablets (5 mg total) by mouth 2 (two) times daily. 90 tablet 3    lisinopriL 10 MG tablet Take 1 tablet (10 mg total) by mouth once daily. 90 tablet 3    metFORMIN (GLUCOPHAGE) 1000 MG tablet Take 0.5 tablets (500 mg total) by mouth 2 (two) times daily. 90 tablet 3    metoprolol tartrate (LOPRESSOR) 25 MG tablet Take 0.5 tablets (12.5 mg total) by mouth 2 (two) times daily. 90 tablet 3    montelukast (SINGULAIR) 10 mg tablet Take 1 tablet (10 mg total) by mouth once  daily. 90 tablet 1    pravastatin (PRAVACHOL) 20 MG tablet Take 1 tablet (20 mg total) by mouth nightly. 90 tablet 1    SITagliptin (JANUVIA) 100 MG Tab Take 1 tablet (100 mg total) by mouth once daily. 90 tablet 1    tamsulosin (FLOMAX) 0.4 mg Cap Take 1 capsule (0.4 mg total) by mouth every evening. DO NOT CRUSH OR CHEW; SWALLOW WHOLE. 90 capsule 1    fluconazole (DIFLUCAN) 150 MG Tab Take 1 tab (150 mg total) by mouth every 72 hours for 3 doses 3 tablet 1     No current facility-administered medications for this visit.      Review of patient's allergies indicates:  No Known Allergies   Past Medical History:   Diagnosis Date    COPD (chronic obstructive pulmonary disease)     Diabetes mellitus, type 2     Gastroenteritis     Seasonal allergic rhinitis due to pollen     Shingles      Past Surgical History:   Procedure Laterality Date    ABDOMINAL AORTOGRAPHY N/A 9/9/2020    Procedure: Aortogram, Abdominal;  Surgeon: Carlitos Jaime MD;  Location: [facility] CATH/EP LAB;  Service: Cardiology;  Laterality: N/A;    APPENDECTOMY      CATHETERIZATION OF BOTH LEFT AND RIGHT HEART N/A 9/9/2020    Procedure: CATHETERIZATION, HEART, BOTH LEFT AND RIGHT;  Surgeon: Carlitos Jaime MD;  Location: [facility] CATH/EP LAB;  Service: Cardiology;  Laterality: N/A;    CYSTOSCOPY N/A 6/10/2020    Procedure: CYSTOSCOPY;  Surgeon: Hunter Guerrero Jr., MD;  Location: [facility] OR;  Service: Urology;  Laterality: N/A;    EYE SURGERY Left     TRANSRECTAL ULTRASOUND EXAMINATION N/A 6/10/2020    Procedure: ULTRASOUND, RECTAL APPROACH;  Surgeon: Hunter Guerrero Jr., MD;  Location: [facility] OR;  Service: Urology;  Laterality: N/A;       Review of Systems   Constitutional: Negative for activity change, appetite change, fatigue and fever.   HENT: Negative for congestion, ear pain, hearing loss, postnasal drip, sinus pressure, sinus pain, sneezing and sore throat.    Eyes: Negative for photophobia and pain.        Blind in left eye   Respiratory:  "Positive for shortness of breath (At times, but improved on Trelegy). Negative for cough, chest tightness and wheezing.    Cardiovascular: Negative for chest pain and leg swelling.   Gastrointestinal: Negative for abdominal distention, abdominal pain, blood in stool, constipation, diarrhea, nausea and vomiting.   Endocrine: Negative for cold intolerance, heat intolerance, polydipsia and polyuria.   Genitourinary: Negative for difficulty urinating, dysuria, flank pain, frequency, hematuria and urgency.        Nocturia   Musculoskeletal: Negative for arthralgias, back pain, joint swelling, myalgias and neck pain.   Skin: Negative for pallor and rash.        Battles a yeast infection in mouth and feet at times - states he was told this is R/T steroids in his respiratory inhalers   Allergic/Immunologic: Negative for environmental allergies and food allergies.   Neurological: Negative for dizziness, weakness, light-headedness and headaches.   Hematological: Does not bruise/bleed easily.   Psychiatric/Behavioral: Negative for confusion, decreased concentration and sleep disturbance. The patient is not nervous/anxious.       OBJECTIVE:      Vitals:    12/30/20 1318   BP: (P) 106/62   BP Location: (P) Right arm   Patient Position: (P) Sitting   BP Method: (P) Large (Manual)   Pulse: 84   Resp: (P) 20   Temp: 98.4 °F (36.9 °C)   TempSrc: Oral   SpO2: 96%   Weight: 96.1 kg (211 lb 14.4 oz)   Height: 5' 11" (1.803 m)     Physical Exam  Vitals signs and nursing note reviewed.   Constitutional:       General: He is not in acute distress.     Appearance: Normal appearance. He is well-developed. He is obese.      Comments: 5 lb gain since Aisha visit   HENT:      Head: Normocephalic and atraumatic.      Right Ear: Hearing normal.      Left Ear: Hearing normal.      Nose: Nose normal. No rhinorrhea.   Eyes:      General: Lids are normal.         Right eye: No discharge.         Left eye: No discharge.      Conjunctiva/sclera: " Conjunctivae normal.      Right eye: Right conjunctiva is not injected.      Left eye: Left conjunctiva is not injected.      Pupils: Pupils are equal, round, and reactive to light. Pupils are equal.      Right eye: Pupil is round and reactive.      Left eye: Pupil is round and reactive.      Comments: Exotropia left eye   Neck:      Musculoskeletal: Normal range of motion and neck supple.      Thyroid: No thyromegaly.      Vascular: No JVD.      Trachea: Trachea normal. No tracheal deviation.   Cardiovascular:      Rate and Rhythm: Normal rate and regular rhythm.      Pulses:           Radial pulses are 2+ on the right side and 2+ on the left side.      Heart sounds: Normal heart sounds. No murmur. No friction rub. No gallop.    Pulmonary:      Effort: Pulmonary effort is normal. No respiratory distress.      Breath sounds: Normal breath sounds. No stridor. No decreased breath sounds, wheezing, rhonchi or rales.   Abdominal:      General: Bowel sounds are normal. There is no distension.      Palpations: Abdomen is soft. Abdomen is not rigid.      Tenderness: There is no abdominal tenderness. There is no guarding.   Musculoskeletal: Normal range of motion.   Lymphadenopathy:      Cervical: No cervical adenopathy.   Skin:     General: Skin is warm and dry.      Capillary Refill: Capillary refill takes less than 2 seconds.      Coloration: Skin is not pale.      Findings: No lesion or rash.      Comments: Tinea corporis mahamed   Neurological:      Mental Status: He is alert and oriented to person, place, and time.      Motor: No atrophy.      Coordination: Coordination normal.      Gait: Gait normal.   Psychiatric:         Attention and Perception: He is attentive.         Speech: Speech normal.         Behavior: Behavior normal.         Thought Content: Thought content normal.         Judgment: Judgment normal.        Assessment:       1. Type 2 diabetes mellitus with stage 3b chronic kidney disease, without long-term  current use of insulin    2. Essential hypertension    3. Pulmonary emphysema, unspecified emphysema type    4. Dyslipidemia    5. Tinea corporis    6. Nocturia        Plan:       Type 2 diabetes mellitus with stage 3b chronic kidney disease, without long-term current use of insulin  -     Hemoglobin A1C, POCT  -     lisinopriL 10 MG tablet; Take 1 tablet (10 mg total) by mouth once daily.  Dispense: 90 tablet; Refill: 3  -     metFORMIN (GLUCOPHAGE) 1000 MG tablet; Take 0.5 tablets (500 mg total) by mouth 2 (two) times daily.  Dispense: 90 tablet; Refill: 3  -     SITagliptin (JANUVIA) 100 MG Tab; Take 1 tablet (100 mg total) by mouth once daily.  Dispense: 90 tablet; Refill: 1  -     glipiZIDE (GLUCOTROL) 10 MG tablet; Take 0.5 tablets (5 mg total) by mouth 2 (two) times daily.  Dispense: 90 tablet; Refill: 3    Essential hypertension  -     lisinopriL 10 MG tablet; Take 1 tablet (10 mg total) by mouth once daily.  Dispense: 90 tablet; Refill: 3  -     metoprolol tartrate (LOPRESSOR) 25 MG tablet; Take 0.5 tablets (12.5 mg total) by mouth 2 (two) times daily.  Dispense: 90 tablet; Refill: 3    Pulmonary emphysema, unspecified emphysema type  -     fluticasone-umeclidin-vilanter (TRELEGY ELLIPTA) 100-62.5-25 mcg DsDv; Inhale 1 puff into the lungs once daily.  Dispense: 1 each; Refill: 3  -     montelukast (SINGULAIR) 10 mg tablet; Take 1 tablet (10 mg total) by mouth once daily.  Dispense: 90 tablet; Refill: 1    Dyslipidemia  -     pravastatin (PRAVACHOL) 20 MG tablet; Take 1 tablet (20 mg total) by mouth nightly.  Dispense: 90 tablet; Refill: 1    Tinea corporis  -     fluconazole (DIFLUCAN) 150 MG Tab; Take 1 tab (150 mg total) by mouth every 72 hours for 3 doses  Dispense: 3 tablet; Refill: 1    Nocturia  -     doxazosin (CARDURA) 2 MG tablet; Take 1 tablet (2 mg total) by mouth every evening.  Dispense: 90 tablet; Refill: 1  -     finasteride (PROSCAR) 5 mg tablet; Take 1 tablet (5 mg total) by mouth every  evening.  Dispense: 90 tablet; Refill: 1  -     tamsulosin (FLOMAX) 0.4 mg Cap; Take 1 capsule (0.4 mg total) by mouth every evening. DO NOT CRUSH OR CHEW; SWALLOW WHOLE.  Dispense: 90 capsule; Refill: 1        Follow up in about 6 months (around 6/30/2021) for DM recheck.      12/31/2020 RONNIE Lopez, FNP-C

## 2021-01-09 ENCOUNTER — IMMUNIZATION (OUTPATIENT)
Dept: PRIMARY CARE CLINIC | Facility: CLINIC | Age: 76
End: 2021-01-09
Payer: MEDICARE

## 2021-01-09 DIAGNOSIS — Z23 NEED FOR VACCINATION: ICD-10-CM

## 2021-01-09 PROCEDURE — 91300 COVID-19, MRNA, LNP-S, PF, 30 MCG/0.3 ML DOSE VACCINE: ICD-10-PCS | Mod: S$GLB,,, | Performed by: FAMILY MEDICINE

## 2021-01-09 PROCEDURE — 0001A COVID-19, MRNA, LNP-S, PF, 30 MCG/0.3 ML DOSE VACCINE: ICD-10-PCS | Mod: S$GLB,,, | Performed by: FAMILY MEDICINE

## 2021-01-09 PROCEDURE — 91300 COVID-19, MRNA, LNP-S, PF, 30 MCG/0.3 ML DOSE VACCINE: CPT | Mod: S$GLB,,, | Performed by: FAMILY MEDICINE

## 2021-01-09 PROCEDURE — 0001A COVID-19, MRNA, LNP-S, PF, 30 MCG/0.3 ML DOSE VACCINE: CPT | Mod: S$GLB,,, | Performed by: FAMILY MEDICINE

## 2021-01-30 ENCOUNTER — IMMUNIZATION (OUTPATIENT)
Dept: PRIMARY CARE CLINIC | Facility: CLINIC | Age: 76
End: 2021-01-30
Payer: MEDICARE

## 2021-01-30 DIAGNOSIS — Z23 NEED FOR VACCINATION: Primary | ICD-10-CM

## 2021-01-30 PROCEDURE — 0002A COVID-19, MRNA, LNP-S, PF, 30 MCG/0.3 ML DOSE VACCINE: ICD-10-PCS | Mod: S$GLB,,, | Performed by: FAMILY MEDICINE

## 2021-01-30 PROCEDURE — 91300 COVID-19, MRNA, LNP-S, PF, 30 MCG/0.3 ML DOSE VACCINE: ICD-10-PCS | Mod: S$GLB,,, | Performed by: FAMILY MEDICINE

## 2021-01-30 PROCEDURE — 0002A COVID-19, MRNA, LNP-S, PF, 30 MCG/0.3 ML DOSE VACCINE: CPT | Mod: S$GLB,,, | Performed by: FAMILY MEDICINE

## 2021-01-30 PROCEDURE — 91300 COVID-19, MRNA, LNP-S, PF, 30 MCG/0.3 ML DOSE VACCINE: CPT | Mod: S$GLB,,, | Performed by: FAMILY MEDICINE

## 2021-02-27 DIAGNOSIS — N18.32 TYPE 2 DIABETES MELLITUS WITH STAGE 3B CHRONIC KIDNEY DISEASE, WITHOUT LONG-TERM CURRENT USE OF INSULIN: ICD-10-CM

## 2021-02-27 DIAGNOSIS — E11.22 TYPE 2 DIABETES MELLITUS WITH STAGE 3B CHRONIC KIDNEY DISEASE, WITHOUT LONG-TERM CURRENT USE OF INSULIN: ICD-10-CM

## 2021-02-27 DIAGNOSIS — I10 ESSENTIAL HYPERTENSION: ICD-10-CM

## 2021-03-02 RX ORDER — LISINOPRIL 10 MG/1
10 TABLET ORAL DAILY
Qty: 90 TABLET | Refills: 3 | Status: SHIPPED | OUTPATIENT
Start: 2021-03-02 | End: 2022-01-03 | Stop reason: SDUPTHER

## 2021-04-22 ENCOUNTER — OFFICE VISIT (OUTPATIENT)
Dept: PULMONOLOGY | Facility: CLINIC | Age: 76
End: 2021-04-22
Payer: MEDICARE

## 2021-04-22 VITALS
DIASTOLIC BLOOD PRESSURE: 68 MMHG | BODY MASS INDEX: 29.57 KG/M2 | SYSTOLIC BLOOD PRESSURE: 102 MMHG | WEIGHT: 212 LBS | OXYGEN SATURATION: 95 % | HEART RATE: 87 BPM

## 2021-04-22 DIAGNOSIS — R06.09 DYSPNEA ON EFFORT: ICD-10-CM

## 2021-04-22 DIAGNOSIS — J44.9 CHRONIC OBSTRUCTIVE PULMONARY DISEASE, UNSPECIFIED COPD TYPE: ICD-10-CM

## 2021-04-22 DIAGNOSIS — B35.4 TINEA CORPORIS: ICD-10-CM

## 2021-04-22 DIAGNOSIS — R93.89 ABNORMAL CT OF THE CHEST: ICD-10-CM

## 2021-04-22 PROCEDURE — 99214 PR OFFICE/OUTPT VISIT, EST, LEVL IV, 30-39 MIN: ICD-10-PCS | Mod: S$GLB,,, | Performed by: INTERNAL MEDICINE

## 2021-04-22 PROCEDURE — 99214 OFFICE O/P EST MOD 30 MIN: CPT | Mod: S$GLB,,, | Performed by: INTERNAL MEDICINE

## 2021-04-22 RX ORDER — FLUCONAZOLE 150 MG/1
TABLET ORAL
Qty: 3 TABLET | Refills: 1 | Status: SHIPPED | OUTPATIENT
Start: 2021-04-22 | End: 2021-06-30

## 2021-06-28 ENCOUNTER — TELEPHONE (OUTPATIENT)
Dept: FAMILY MEDICINE | Facility: CLINIC | Age: 76
End: 2021-06-28

## 2021-06-28 ENCOUNTER — PATIENT MESSAGE (OUTPATIENT)
Dept: FAMILY MEDICINE | Facility: CLINIC | Age: 76
End: 2021-06-28

## 2021-06-28 DIAGNOSIS — E11.22 TYPE 2 DIABETES MELLITUS WITH STAGE 3B CHRONIC KIDNEY DISEASE, WITHOUT LONG-TERM CURRENT USE OF INSULIN: ICD-10-CM

## 2021-06-28 DIAGNOSIS — N18.32 TYPE 2 DIABETES MELLITUS WITH STAGE 3B CHRONIC KIDNEY DISEASE, WITHOUT LONG-TERM CURRENT USE OF INSULIN: ICD-10-CM

## 2021-06-28 DIAGNOSIS — Z11.59 ENCOUNTER FOR HEPATITIS C SCREENING TEST FOR LOW RISK PATIENT: ICD-10-CM

## 2021-06-28 DIAGNOSIS — I10 ESSENTIAL HYPERTENSION: ICD-10-CM

## 2021-06-28 DIAGNOSIS — Z12.5 SCREENING FOR PROSTATE CANCER: ICD-10-CM

## 2021-06-28 DIAGNOSIS — E78.5 DYSLIPIDEMIA: Primary | ICD-10-CM

## 2021-06-29 ENCOUNTER — TELEPHONE (OUTPATIENT)
Dept: FAMILY MEDICINE | Facility: CLINIC | Age: 76
End: 2021-06-29

## 2021-06-30 ENCOUNTER — OFFICE VISIT (OUTPATIENT)
Dept: FAMILY MEDICINE | Facility: CLINIC | Age: 76
End: 2021-06-30
Payer: MEDICARE

## 2021-06-30 ENCOUNTER — LAB VISIT (OUTPATIENT)
Dept: LAB | Facility: HOSPITAL | Age: 76
End: 2021-06-30
Attending: NURSE PRACTITIONER
Payer: MEDICARE

## 2021-06-30 VITALS
WEIGHT: 209.69 LBS | TEMPERATURE: 98 F | BODY MASS INDEX: 29.35 KG/M2 | OXYGEN SATURATION: 97 % | SYSTOLIC BLOOD PRESSURE: 118 MMHG | HEART RATE: 75 BPM | HEIGHT: 71 IN | DIASTOLIC BLOOD PRESSURE: 72 MMHG

## 2021-06-30 DIAGNOSIS — I10 ESSENTIAL HYPERTENSION: ICD-10-CM

## 2021-06-30 DIAGNOSIS — E11.22 TYPE 2 DIABETES MELLITUS WITH STAGE 3B CHRONIC KIDNEY DISEASE, WITHOUT LONG-TERM CURRENT USE OF INSULIN: Primary | ICD-10-CM

## 2021-06-30 DIAGNOSIS — Z12.5 SCREENING FOR PROSTATE CANCER: ICD-10-CM

## 2021-06-30 DIAGNOSIS — E11.22 TYPE 2 DIABETES MELLITUS WITH STAGE 3B CHRONIC KIDNEY DISEASE, WITHOUT LONG-TERM CURRENT USE OF INSULIN: ICD-10-CM

## 2021-06-30 DIAGNOSIS — J43.9 PULMONARY EMPHYSEMA, UNSPECIFIED EMPHYSEMA TYPE: ICD-10-CM

## 2021-06-30 DIAGNOSIS — B37.0 THRUSH, ORAL: ICD-10-CM

## 2021-06-30 DIAGNOSIS — Z11.59 ENCOUNTER FOR HEPATITIS C SCREENING TEST FOR LOW RISK PATIENT: ICD-10-CM

## 2021-06-30 DIAGNOSIS — R35.1 NOCTURIA: ICD-10-CM

## 2021-06-30 DIAGNOSIS — E78.5 DYSLIPIDEMIA: ICD-10-CM

## 2021-06-30 DIAGNOSIS — N18.32 TYPE 2 DIABETES MELLITUS WITH STAGE 3B CHRONIC KIDNEY DISEASE, WITHOUT LONG-TERM CURRENT USE OF INSULIN: ICD-10-CM

## 2021-06-30 DIAGNOSIS — N18.32 TYPE 2 DIABETES MELLITUS WITH STAGE 3B CHRONIC KIDNEY DISEASE, WITHOUT LONG-TERM CURRENT USE OF INSULIN: Primary | ICD-10-CM

## 2021-06-30 LAB
ALBUMIN SERPL BCP-MCNC: 4.1 G/DL (ref 3.5–5.2)
ALBUMIN/CREAT UR: 14.4 UG/MG (ref 0–30)
ALP SERPL-CCNC: 51 U/L (ref 55–135)
ALT SERPL W/O P-5'-P-CCNC: 31 U/L (ref 10–44)
ANION GAP SERPL CALC-SCNC: 12 MMOL/L (ref 8–16)
AST SERPL-CCNC: 27 U/L (ref 10–40)
BILIRUB SERPL-MCNC: 1.1 MG/DL (ref 0.1–1)
BUN SERPL-MCNC: 17 MG/DL (ref 8–23)
CALCIUM SERPL-MCNC: 9 MG/DL (ref 8.7–10.5)
CHLORIDE SERPL-SCNC: 101 MMOL/L (ref 95–110)
CHOLEST SERPL-MCNC: 141 MG/DL (ref 120–199)
CHOLEST/HDLC SERPL: 3.3 {RATIO} (ref 2–5)
CO2 SERPL-SCNC: 23 MMOL/L (ref 23–29)
COMPLEXED PSA SERPL-MCNC: 0.32 NG/ML (ref 0–4)
CREAT SERPL-MCNC: 1.5 MG/DL (ref 0.5–1.4)
CREAT UR-MCNC: 95 MG/DL (ref 23–375)
EST. GFR  (AFRICAN AMERICAN): 50.8 ML/MIN/1.73 M^2
EST. GFR  (NON AFRICAN AMERICAN): 44 ML/MIN/1.73 M^2
ESTIMATED AVG GLUCOSE: 160 MG/DL (ref 68–131)
GLUCOSE SERPL-MCNC: 197 MG/DL (ref 70–110)
HBA1C MFR BLD: 7.2 % (ref 4.5–6.2)
HDLC SERPL-MCNC: 43 MG/DL (ref 40–75)
HDLC SERPL: 30.5 % (ref 20–50)
LDLC SERPL CALC-MCNC: 74.4 MG/DL (ref 63–159)
MICROALBUMIN UR DL<=1MG/L-MCNC: 13.7 UG/ML
NONHDLC SERPL-MCNC: 98 MG/DL
POTASSIUM SERPL-SCNC: 4.3 MMOL/L (ref 3.5–5.1)
PROT SERPL-MCNC: 6.9 G/DL (ref 6–8.4)
SODIUM SERPL-SCNC: 136 MMOL/L (ref 136–145)
TRIGL SERPL-MCNC: 118 MG/DL (ref 30–150)

## 2021-06-30 PROCEDURE — 83036 HEMOGLOBIN GLYCOSYLATED A1C: CPT | Performed by: NURSE PRACTITIONER

## 2021-06-30 PROCEDURE — 80061 LIPID PANEL: CPT | Performed by: NURSE PRACTITIONER

## 2021-06-30 PROCEDURE — 80053 COMPREHEN METABOLIC PANEL: CPT | Performed by: NURSE PRACTITIONER

## 2021-06-30 PROCEDURE — 82570 ASSAY OF URINE CREATININE: CPT | Performed by: NURSE PRACTITIONER

## 2021-06-30 PROCEDURE — 99214 OFFICE O/P EST MOD 30 MIN: CPT | Performed by: NURSE PRACTITIONER

## 2021-06-30 PROCEDURE — 82043 UR ALBUMIN QUANTITATIVE: CPT | Performed by: NURSE PRACTITIONER

## 2021-06-30 PROCEDURE — 36415 COLL VENOUS BLD VENIPUNCTURE: CPT | Performed by: NURSE PRACTITIONER

## 2021-06-30 PROCEDURE — 84153 ASSAY OF PSA TOTAL: CPT | Performed by: NURSE PRACTITIONER

## 2021-06-30 PROCEDURE — 99214 OFFICE O/P EST MOD 30 MIN: CPT | Mod: S$PBB,,, | Performed by: NURSE PRACTITIONER

## 2021-06-30 PROCEDURE — 86803 HEPATITIS C AB TEST: CPT | Performed by: NURSE PRACTITIONER

## 2021-06-30 PROCEDURE — 99214 PR OFFICE/OUTPT VISIT, EST, LEVL IV, 30-39 MIN: ICD-10-PCS | Mod: S$PBB,,, | Performed by: NURSE PRACTITIONER

## 2021-06-30 RX ORDER — FINASTERIDE 5 MG/1
5 TABLET, FILM COATED ORAL NIGHTLY
Qty: 90 TABLET | Refills: 1 | Status: SHIPPED | OUTPATIENT
Start: 2021-06-30 | End: 2022-01-03 | Stop reason: SDUPTHER

## 2021-06-30 RX ORDER — MONTELUKAST SODIUM 10 MG/1
10 TABLET ORAL DAILY
Qty: 90 TABLET | Refills: 1 | Status: SHIPPED | OUTPATIENT
Start: 2021-06-30 | End: 2021-11-16

## 2021-06-30 RX ORDER — DOXAZOSIN 2 MG/1
2 TABLET ORAL NIGHTLY
Qty: 90 TABLET | Refills: 1 | Status: SHIPPED | OUTPATIENT
Start: 2021-06-30 | End: 2021-11-22 | Stop reason: SDUPTHER

## 2021-06-30 RX ORDER — PRAVASTATIN SODIUM 20 MG/1
20 TABLET ORAL NIGHTLY
Qty: 90 TABLET | Refills: 1 | Status: SHIPPED | OUTPATIENT
Start: 2021-06-30 | End: 2021-12-11 | Stop reason: SDUPTHER

## 2021-06-30 RX ORDER — FLUCONAZOLE 150 MG/1
150 TABLET ORAL DAILY
Qty: 1 TABLET | Refills: 1 | Status: SHIPPED | OUTPATIENT
Start: 2021-06-30 | End: 2021-07-01

## 2021-06-30 RX ORDER — FLASH GLUCOSE SENSOR
1 KIT MISCELLANEOUS
Qty: 2 KIT | Refills: 2 | Status: SHIPPED | OUTPATIENT
Start: 2021-06-30

## 2021-06-30 RX ORDER — FLASH GLUCOSE SCANNING READER
1 EACH MISCELLANEOUS DAILY
Qty: 1 EACH | Refills: 0 | Status: SHIPPED | OUTPATIENT
Start: 2021-06-30

## 2021-06-30 RX ORDER — TAMSULOSIN HYDROCHLORIDE 0.4 MG/1
0.4 CAPSULE ORAL NIGHTLY
Qty: 90 CAPSULE | Refills: 1 | Status: SHIPPED | OUTPATIENT
Start: 2021-06-30 | End: 2021-08-18 | Stop reason: SDUPTHER

## 2021-07-01 LAB — HCV AB S/CO SERPL IA: <0.1 S/CO RATIO (ref 0–0.9)

## 2021-07-02 ENCOUNTER — PATIENT MESSAGE (OUTPATIENT)
Dept: FAMILY MEDICINE | Facility: CLINIC | Age: 76
End: 2021-07-02

## 2021-07-22 DIAGNOSIS — J43.9 PULMONARY EMPHYSEMA, UNSPECIFIED EMPHYSEMA TYPE: ICD-10-CM

## 2021-08-16 DIAGNOSIS — R35.1 NOCTURIA: ICD-10-CM

## 2021-08-18 RX ORDER — TAMSULOSIN HYDROCHLORIDE 0.4 MG/1
0.8 CAPSULE ORAL NIGHTLY
Qty: 180 CAPSULE | Refills: 0 | Status: SHIPPED | OUTPATIENT
Start: 2021-08-18 | End: 2021-11-19 | Stop reason: SDUPTHER

## 2021-10-05 ENCOUNTER — TELEPHONE (OUTPATIENT)
Dept: PULMONOLOGY | Facility: CLINIC | Age: 76
End: 2021-10-05

## 2021-10-05 ENCOUNTER — HOSPITAL ENCOUNTER (OUTPATIENT)
Dept: RADIOLOGY | Facility: HOSPITAL | Age: 76
Discharge: HOME | End: 2021-10-05
Attending: NURSE PRACTITIONER
Payer: MEDICARE

## 2021-10-05 ENCOUNTER — OFFICE VISIT (OUTPATIENT)
Dept: FAMILY MEDICINE | Facility: CLINIC | Age: 76
End: 2021-10-05
Payer: MEDICARE

## 2021-10-05 VITALS
RESPIRATION RATE: 18 BRPM | SYSTOLIC BLOOD PRESSURE: 124 MMHG | TEMPERATURE: 98 F | WEIGHT: 203.19 LBS | BODY MASS INDEX: 28.45 KG/M2 | HEIGHT: 71 IN | DIASTOLIC BLOOD PRESSURE: 82 MMHG | HEART RATE: 94 BPM | OXYGEN SATURATION: 97 %

## 2021-10-05 DIAGNOSIS — G89.29 CHRONIC BILATERAL LOW BACK PAIN, UNSPECIFIED WHETHER SCIATICA PRESENT: Primary | ICD-10-CM

## 2021-10-05 DIAGNOSIS — G89.29 CHRONIC BILATERAL LOW BACK PAIN, UNSPECIFIED WHETHER SCIATICA PRESENT: ICD-10-CM

## 2021-10-05 DIAGNOSIS — N18.32 TYPE 2 DIABETES MELLITUS WITH STAGE 3B CHRONIC KIDNEY DISEASE, WITHOUT LONG-TERM CURRENT USE OF INSULIN: ICD-10-CM

## 2021-10-05 DIAGNOSIS — M54.50 CHRONIC BILATERAL LOW BACK PAIN, UNSPECIFIED WHETHER SCIATICA PRESENT: ICD-10-CM

## 2021-10-05 DIAGNOSIS — E11.22 TYPE 2 DIABETES MELLITUS WITH STAGE 3B CHRONIC KIDNEY DISEASE, WITHOUT LONG-TERM CURRENT USE OF INSULIN: ICD-10-CM

## 2021-10-05 DIAGNOSIS — R04.2 HEMOPTYSIS: ICD-10-CM

## 2021-10-05 DIAGNOSIS — M54.50 CHRONIC BILATERAL LOW BACK PAIN, UNSPECIFIED WHETHER SCIATICA PRESENT: Primary | ICD-10-CM

## 2021-10-05 PROCEDURE — 99215 OFFICE O/P EST HI 40 MIN: CPT | Performed by: NURSE PRACTITIONER

## 2021-10-05 PROCEDURE — 99214 OFFICE O/P EST MOD 30 MIN: CPT | Mod: S$PBB,,, | Performed by: NURSE PRACTITIONER

## 2021-10-05 PROCEDURE — 72110 X-RAY EXAM L-2 SPINE 4/>VWS: CPT | Mod: TC,PO

## 2021-10-05 PROCEDURE — 99214 PR OFFICE/OUTPT VISIT, EST, LEVL IV, 30-39 MIN: ICD-10-PCS | Mod: S$PBB,,, | Performed by: NURSE PRACTITIONER

## 2021-10-05 RX ORDER — MELOXICAM 15 MG/1
15 TABLET ORAL DAILY
Qty: 7 TABLET | Refills: 0 | Status: SHIPPED | OUTPATIENT
Start: 2021-10-05 | End: 2021-10-12

## 2021-10-05 RX ORDER — METFORMIN HYDROCHLORIDE 1000 MG/1
1000 TABLET ORAL 2 TIMES DAILY
Qty: 180 TABLET | Refills: 0 | Status: SHIPPED | OUTPATIENT
Start: 2021-10-05 | End: 2021-11-19 | Stop reason: SDUPTHER

## 2021-10-05 RX ORDER — GLIPIZIDE 10 MG/1
10 TABLET ORAL 2 TIMES DAILY
Qty: 180 TABLET | Refills: 0 | Status: SHIPPED | OUTPATIENT
Start: 2021-10-05 | End: 2022-01-03 | Stop reason: SDUPTHER

## 2021-10-11 ENCOUNTER — PATIENT MESSAGE (OUTPATIENT)
Dept: FAMILY MEDICINE | Facility: CLINIC | Age: 76
End: 2021-10-11

## 2021-10-11 DIAGNOSIS — M47.819 FACET ARTHROPATHY: Primary | ICD-10-CM

## 2021-10-25 ENCOUNTER — OFFICE VISIT (OUTPATIENT)
Dept: PULMONOLOGY | Facility: CLINIC | Age: 76
End: 2021-10-25
Payer: MEDICARE

## 2021-10-25 VITALS
OXYGEN SATURATION: 94 % | HEART RATE: 82 BPM | DIASTOLIC BLOOD PRESSURE: 82 MMHG | BODY MASS INDEX: 28.31 KG/M2 | SYSTOLIC BLOOD PRESSURE: 116 MMHG | WEIGHT: 203 LBS

## 2021-10-25 DIAGNOSIS — J44.9 CHRONIC OBSTRUCTIVE PULMONARY DISEASE, UNSPECIFIED COPD TYPE: ICD-10-CM

## 2021-10-25 DIAGNOSIS — J43.9 PULMONARY EMPHYSEMA, UNSPECIFIED EMPHYSEMA TYPE: ICD-10-CM

## 2021-10-25 DIAGNOSIS — R93.89 ABNORMAL CT OF THE CHEST: ICD-10-CM

## 2021-10-25 PROCEDURE — 99214 PR OFFICE/OUTPT VISIT, EST, LEVL IV, 30-39 MIN: ICD-10-PCS | Mod: S$GLB,,, | Performed by: INTERNAL MEDICINE

## 2021-10-25 PROCEDURE — 99214 OFFICE O/P EST MOD 30 MIN: CPT | Mod: S$GLB,,, | Performed by: INTERNAL MEDICINE

## 2021-10-28 DIAGNOSIS — M54.50 LUMBAGO: Primary | ICD-10-CM

## 2021-10-29 ENCOUNTER — HOSPITAL ENCOUNTER (OUTPATIENT)
Dept: RADIOLOGY | Facility: HOSPITAL | Age: 76
Discharge: HOME | End: 2021-10-29
Attending: ANESTHESIOLOGY
Payer: MEDICARE

## 2021-10-29 DIAGNOSIS — M54.50 LUMBAGO: ICD-10-CM

## 2021-10-29 PROCEDURE — 72220 X-RAY EXAM SACRUM TAILBONE: CPT | Mod: TC,PO

## 2021-11-29 DIAGNOSIS — B37.0 THRUSH, ORAL: Primary | ICD-10-CM

## 2021-11-30 RX ORDER — FLUCONAZOLE 150 MG/1
150 TABLET ORAL DAILY
Qty: 1 TABLET | Refills: 0 | Status: SHIPPED | OUTPATIENT
Start: 2021-11-30 | End: 2021-12-01

## 2021-12-18 ENCOUNTER — PATIENT MESSAGE (OUTPATIENT)
Dept: FAMILY MEDICINE | Facility: CLINIC | Age: 76
End: 2021-12-18
Payer: MEDICARE

## 2022-01-03 ENCOUNTER — OFFICE VISIT (OUTPATIENT)
Dept: FAMILY MEDICINE | Facility: CLINIC | Age: 77
End: 2022-01-03
Payer: MEDICARE

## 2022-01-03 VITALS
BODY MASS INDEX: 28.66 KG/M2 | HEIGHT: 71 IN | TEMPERATURE: 98 F | DIASTOLIC BLOOD PRESSURE: 82 MMHG | HEART RATE: 53 BPM | SYSTOLIC BLOOD PRESSURE: 126 MMHG | OXYGEN SATURATION: 98 % | WEIGHT: 204.69 LBS

## 2022-01-03 DIAGNOSIS — R35.1 NOCTURIA: ICD-10-CM

## 2022-01-03 DIAGNOSIS — N18.32 TYPE 2 DIABETES MELLITUS WITH STAGE 3B CHRONIC KIDNEY DISEASE, WITHOUT LONG-TERM CURRENT USE OF INSULIN: Primary | ICD-10-CM

## 2022-01-03 DIAGNOSIS — B37.2 SKIN YEAST INFECTION: ICD-10-CM

## 2022-01-03 DIAGNOSIS — I10 ESSENTIAL HYPERTENSION: ICD-10-CM

## 2022-01-03 DIAGNOSIS — E11.22 TYPE 2 DIABETES MELLITUS WITH STAGE 3B CHRONIC KIDNEY DISEASE, WITHOUT LONG-TERM CURRENT USE OF INSULIN: Primary | ICD-10-CM

## 2022-01-03 LAB — HBA1C MFR BLD: 7.6 %

## 2022-01-03 PROCEDURE — 99214 OFFICE O/P EST MOD 30 MIN: CPT | Mod: S$PBB,,, | Performed by: NURSE PRACTITIONER

## 2022-01-03 PROCEDURE — 83036 HEMOGLOBIN GLYCOSYLATED A1C: CPT | Mod: PBBFAC | Performed by: NURSE PRACTITIONER

## 2022-01-03 PROCEDURE — 99214 OFFICE O/P EST MOD 30 MIN: CPT | Performed by: NURSE PRACTITIONER

## 2022-01-03 PROCEDURE — 99214 PR OFFICE/OUTPT VISIT, EST, LEVL IV, 30-39 MIN: ICD-10-PCS | Mod: S$PBB,,, | Performed by: NURSE PRACTITIONER

## 2022-01-03 RX ORDER — FINASTERIDE 5 MG/1
5 TABLET, FILM COATED ORAL NIGHTLY
Qty: 90 TABLET | Refills: 1 | Status: SHIPPED | OUTPATIENT
Start: 2022-01-03 | End: 2022-05-16

## 2022-01-03 RX ORDER — FLUCONAZOLE 150 MG/1
TABLET ORAL
Qty: 2 TABLET | Refills: 5 | Status: SHIPPED | OUTPATIENT
Start: 2022-01-03 | End: 2022-07-06 | Stop reason: SDUPTHER

## 2022-01-03 RX ORDER — DOXAZOSIN 2 MG/1
2 TABLET ORAL NIGHTLY
Qty: 90 TABLET | Refills: 1 | Status: SHIPPED | OUTPATIENT
Start: 2022-01-03 | End: 2022-07-06 | Stop reason: SDUPTHER

## 2022-01-03 RX ORDER — METOPROLOL TARTRATE 25 MG/1
12.5 TABLET, FILM COATED ORAL 2 TIMES DAILY
Qty: 90 TABLET | Refills: 3 | Status: SHIPPED | OUTPATIENT
Start: 2022-01-03 | End: 2022-12-08

## 2022-01-03 RX ORDER — LISINOPRIL 10 MG/1
10 TABLET ORAL DAILY
Qty: 90 TABLET | Refills: 3 | Status: SHIPPED | OUTPATIENT
Start: 2022-01-03 | End: 2022-12-08

## 2022-01-03 RX ORDER — METFORMIN HYDROCHLORIDE 1000 MG/1
1000 TABLET ORAL 2 TIMES DAILY
Qty: 180 TABLET | Refills: 1 | Status: SHIPPED | OUTPATIENT
Start: 2022-01-03 | End: 2022-07-06 | Stop reason: SDUPTHER

## 2022-01-03 RX ORDER — GLIPIZIDE 10 MG/1
10 TABLET ORAL 2 TIMES DAILY
Qty: 180 TABLET | Refills: 1 | Status: SHIPPED | OUTPATIENT
Start: 2022-01-03 | End: 2022-06-30

## 2022-01-03 RX ORDER — TAMSULOSIN HYDROCHLORIDE 0.4 MG/1
0.8 CAPSULE ORAL NIGHTLY
Qty: 180 CAPSULE | Refills: 1 | Status: SHIPPED | OUTPATIENT
Start: 2022-01-03 | End: 2022-07-06 | Stop reason: SDUPTHER

## 2022-01-04 NOTE — PROGRESS NOTES
SUBJECTIVE:      Patient ID: Theo Chen Jr. is a 76 y.o. male.    Chief Complaint: Diabetes (6 month f/u for DM)    Presents for DM recheck, chronic med refills & lab orders - A1c slightly increased from 7.2 in June to 7.6    Discussed outstanding health maintenance     Diabetes  He presents for his follow-up diabetic visit. He has type 2 diabetes mellitus. No MedicAlert identification noted. His disease course has been fluctuating. Pertinent negatives for hypoglycemia include no confusion, dizziness, headaches, nervousness/anxiousness or pallor. Pertinent negatives for diabetes include no chest pain, no fatigue, no polydipsia, no polyuria and no weakness. There are no hypoglycemic complications. Symptoms are stable. There are no diabetic complications. Risk factors for coronary artery disease include diabetes mellitus, dyslipidemia, family history, hypertension, male sex and sedentary lifestyle. Current diabetic treatment includes oral agent (triple therapy). He is compliant with treatment all of the time. His weight is fluctuating minimally. He is following a generally healthy diet. Meal planning includes avoidance of concentrated sweets. He has not had a previous visit with a dietitian. He rarely participates in exercise. An ACE inhibitor/angiotensin II receptor blocker is being taken. He does not see a podiatrist.Eye exam is not current.   Hypertension  This is a chronic problem. The current episode started more than 1 month ago. The problem is controlled. Associated symptoms include shortness of breath (At times, but improved on Trelegy). Pertinent negatives include no chest pain, headaches or neck pain. Risk factors for coronary artery disease include diabetes mellitus, dyslipidemia, family history, male gender and sedentary lifestyle. Past treatments include ACE inhibitors, alpha 1 blockers and beta blockers. The current treatment provides moderate improvement. Compliance problems include diet and  exercise.        Past Surgical History:   Procedure Laterality Date    ABDOMINAL AORTOGRAPHY N/A 2020    Procedure: Aortogram, Abdominal;  Surgeon: Carlitos Jaime MD;  Location: [facility] CATH/EP LAB;  Service: Cardiology;  Laterality: N/A;    APPENDECTOMY      CATHETERIZATION OF BOTH LEFT AND RIGHT HEART N/A 2020    Procedure: CATHETERIZATION, HEART, BOTH LEFT AND RIGHT;  Surgeon: Carlitos Jaime MD;  Location: [facility] CATH/EP LAB;  Service: Cardiology;  Laterality: N/A;    CYSTOSCOPY N/A 6/10/2020    Procedure: CYSTOSCOPY;  Surgeon: Hunter Guerrero Jr., MD;  Location: [facility] OR;  Service: Urology;  Laterality: N/A;    EYE SURGERY Left     TRANSRECTAL ULTRASOUND EXAMINATION N/A 6/10/2020    Procedure: ULTRASOUND, RECTAL APPROACH;  Surgeon: Hunter Guerrero Jr., MD;  Location: [facility] OR;  Service: Urology;  Laterality: N/A;     Family History   Problem Relation Age of Onset    Hypertension Mother     Heart disease Father       Social History     Socioeconomic History    Marital status:    Occupational History    Occupation: retired    Tobacco Use    Smoking status: Former Smoker     Packs/day: 2.00     Years: 45.00     Pack years: 90.00     Quit date:      Years since quittin.0    Smokeless tobacco: Never Used   Substance and Sexual Activity    Alcohol use: No    Drug use: No     Current Outpatient Medications   Medication Sig Dispense Refill    aspirin (ECOTRIN) 81 MG EC tablet Take 1 tablet (81 mg total) by mouth 2 (two) times daily.      flash glucose scanning reader (FREESTYLE GIANNI 14 DAY READER) Misc 1 each by Misc.(Non-Drug; Combo Route) route once daily. 1 each 0    flash glucose sensor (FREESTYLE GIANNI 14 DAY SENSOR) Kit 1 each by Misc.(Non-Drug; Combo Route) route every 14 (fourteen) days. 2 kit 2    fluticasone-umeclidin-vilanter (TRELEGY ELLIPTA) 100-62.5-25 mcg DsDv Inhale 1 puff into the lungs once daily. 3 each 3    montelukast (SINGULAIR) 10 mg tablet TAKE ONE TABLET  BY MOUTH ONCE DAILY 90 tablet 1    pravastatin (PRAVACHOL) 20 MG tablet Take 1 tablet (20 mg total) by mouth nightly. 90 tablet 1    doxazosin (CARDURA) 2 MG tablet Take 1 tablet (2 mg total) by mouth every evening. 90 tablet 1    finasteride (PROSCAR) 5 mg tablet Take 1 tablet (5 mg total) by mouth every evening. 90 tablet 1    fluconazole (DIFLUCAN) 150 MG Tab Take 1 tab (150 mg) by mouth at first sign of symptomsTHEN take 1 tab (150 mg) after 72 hours from first dose 2 tablet 5    glipiZIDE (GLUCOTROL) 10 MG tablet Take 1 tablet (10 mg total) by mouth 2 (two) times daily. 180 tablet 1    lisinopriL 10 MG tablet Take 1 tablet (10 mg total) by mouth once daily. 90 tablet 3    metFORMIN (GLUCOPHAGE) 1000 MG tablet Take 1 tablet (1,000 mg total) by mouth 2 (two) times daily. 180 tablet 1    metoprolol tartrate (LOPRESSOR) 25 MG tablet Take 0.5 tablets (12.5 mg total) by mouth 2 (two) times daily. 90 tablet 3    SITagliptin (JANUVIA) 100 MG Tab Take 1 tablet (100 mg total) by mouth once daily. 90 tablet 1    tamsulosin (FLOMAX) 0.4 mg Cap Take 2 capsules (0.8 mg total) by mouth every evening. DO NOT CRUSH OR CHEW; SWALLOW WHOLE. 180 capsule 1     No current facility-administered medications for this visit.     Review of patient's allergies indicates:  No Known Allergies   Past Medical History:   Diagnosis Date    COPD (chronic obstructive pulmonary disease)     Diabetes mellitus, type 2     Gastroenteritis     Seasonal allergic rhinitis due to pollen     Shingles      Past Surgical History:   Procedure Laterality Date    ABDOMINAL AORTOGRAPHY N/A 9/9/2020    Procedure: Aortogram, Abdominal;  Surgeon: Carlitos Jaime MD;  Location: [facility] CATH/EP LAB;  Service: Cardiology;  Laterality: N/A;    APPENDECTOMY      CATHETERIZATION OF BOTH LEFT AND RIGHT HEART N/A 9/9/2020    Procedure: CATHETERIZATION, HEART, BOTH LEFT AND RIGHT;  Surgeon: Carlitos Jaime MD;  Location: [facility] CATH/EP LAB;  Service:  Cardiology;  Laterality: N/A;    CYSTOSCOPY N/A 6/10/2020    Procedure: CYSTOSCOPY;  Surgeon: Hunter Guerrero Jr., MD;  Location: [facility] OR;  Service: Urology;  Laterality: N/A;    EYE SURGERY Left     TRANSRECTAL ULTRASOUND EXAMINATION N/A 6/10/2020    Procedure: ULTRASOUND, RECTAL APPROACH;  Surgeon: Hunter Guerrero Jr., MD;  Location: [facility] OR;  Service: Urology;  Laterality: N/A;       Review of Systems   Constitutional: Negative for activity change, appetite change, fatigue and fever.   HENT: Negative for congestion, ear pain, hearing loss, postnasal drip, sinus pressure, sinus pain, sneezing and sore throat.         Oral thrush on occasion with Trelegy   Eyes: Negative for photophobia and pain.        Blind in left eye   Respiratory: Positive for shortness of breath (At times, but improved on Trelegy). Negative for cough, chest tightness and wheezing.    Cardiovascular: Negative for chest pain and leg swelling.   Gastrointestinal: Negative for abdominal distention, abdominal pain, blood in stool, constipation, diarrhea, nausea and vomiting.   Endocrine: Negative for cold intolerance, heat intolerance, polydipsia and polyuria.   Genitourinary: Negative for difficulty urinating, dysuria, flank pain, frequency, hematuria and urgency.        Nocturia   Musculoskeletal: Negative for arthralgias, back pain, joint swelling, myalgias and neck pain.   Skin: Positive for rash (intermittent yeast). Negative for pallor.   Allergic/Immunologic: Negative for environmental allergies and food allergies.   Neurological: Negative for dizziness, weakness, light-headedness and headaches.   Hematological: Does not bruise/bleed easily.   Psychiatric/Behavioral: Negative for confusion, decreased concentration and sleep disturbance. The patient is not nervous/anxious.       OBJECTIVE:      Vitals:    01/03/22 0943   BP: 126/82   BP Location: Right arm   Patient Position: Sitting   BP Method: Large (Manual)   Pulse: (!) 53   Temp: 98 °F  "(36.7 °C)   TempSrc: Oral   SpO2: 98%   Weight: 92.9 kg (204 lb 11.2 oz)   Height: 5' 11" (1.803 m)     Physical Exam  Vitals and nursing note reviewed.   Constitutional:       General: He is not in acute distress.     Appearance: Normal appearance. He is well-developed. He is obese.      Comments: 5# loss since Aisha visit   HENT:      Head: Normocephalic and atraumatic.      Right Ear: Hearing normal.      Left Ear: Hearing normal.      Nose: Nose normal. No rhinorrhea.   Eyes:      General: Lids are normal.         Right eye: No discharge.         Left eye: No discharge.      Conjunctiva/sclera: Conjunctivae normal.      Right eye: Right conjunctiva is not injected.      Left eye: Left conjunctiva is not injected.      Pupils: Pupils are equal, round, and reactive to light. Pupils are equal.      Right eye: Pupil is round and reactive.      Left eye: Pupil is round and reactive.      Comments: Exotropia left eye   Neck:      Thyroid: No thyromegaly.      Vascular: No JVD.      Trachea: Trachea normal. No tracheal deviation.   Cardiovascular:      Rate and Rhythm: Regular rhythm. Bradycardia present.      Pulses:           Radial pulses are 2+ on the right side and 2+ on the left side.        Dorsalis pedis pulses are 2+ on the right side and 2+ on the left side.        Posterior tibial pulses are 2+ on the right side.      Heart sounds: Normal heart sounds. No murmur heard.  No friction rub. No gallop.    Pulmonary:      Effort: Pulmonary effort is normal. No respiratory distress.      Breath sounds: Normal breath sounds. No stridor. No decreased breath sounds, wheezing, rhonchi or rales.   Abdominal:      General: Bowel sounds are normal. There is no distension.      Palpations: Abdomen is soft. Abdomen is not rigid.      Tenderness: There is no abdominal tenderness. There is no guarding.   Musculoskeletal:         General: Normal range of motion.      Cervical back: Normal range of motion and neck supple. "   Feet:      Comments: Lack of hair to BLE  Lymphadenopathy:      Cervical: No cervical adenopathy.   Skin:     General: Skin is warm and dry.      Capillary Refill: Capillary refill takes less than 2 seconds.      Coloration: Skin is not pale.      Findings: No lesion or rash.   Neurological:      Mental Status: He is alert and oriented to person, place, and time.      Motor: No atrophy.      Coordination: Coordination normal.      Gait: Gait normal.   Psychiatric:         Attention and Perception: He is attentive.         Speech: Speech normal.         Behavior: Behavior normal.         Thought Content: Thought content normal.         Judgment: Judgment normal.        Assessment:       1. Type 2 diabetes mellitus with stage 3b chronic kidney disease, without long-term current use of insulin    2. Essential hypertension    3. Skin yeast infection    4. Nocturia        Plan:       Type 2 diabetes mellitus with stage 3b chronic kidney disease, without long-term current use of insulin  -     POCT HEMOGLOBIN A1C  -     SITagliptin (JANUVIA) 100 MG Tab; Take 1 tablet (100 mg total) by mouth once daily.  Dispense: 90 tablet; Refill: 1  -     glipiZIDE (GLUCOTROL) 10 MG tablet; Take 1 tablet (10 mg total) by mouth 2 (two) times daily.  Dispense: 180 tablet; Refill: 1  -     lisinopriL 10 MG tablet; Take 1 tablet (10 mg total) by mouth once daily.  Dispense: 90 tablet; Refill: 3  -     metFORMIN (GLUCOPHAGE) 1000 MG tablet; Take 1 tablet (1,000 mg total) by mouth 2 (two) times daily.  Dispense: 180 tablet; Refill: 1    Essential hypertension  -     doxazosin (CARDURA) 2 MG tablet; Take 1 tablet (2 mg total) by mouth every evening.  Dispense: 90 tablet; Refill: 1  -     lisinopriL 10 MG tablet; Take 1 tablet (10 mg total) by mouth once daily.  Dispense: 90 tablet; Refill: 3  -     metoprolol tartrate (LOPRESSOR) 25 MG tablet; Take 0.5 tablets (12.5 mg total) by mouth 2 (two) times daily.  Dispense: 90 tablet; Refill:  3    Skin yeast infection  -     fluconazole (DIFLUCAN) 150 MG Tab; Take 1 tab (150 mg) by mouth at first sign of symptomsTHEN take 1 tab (150 mg) after 72 hours from first dose  Dispense: 2 tablet; Refill: 5    Nocturia  -     doxazosin (CARDURA) 2 MG tablet; Take 1 tablet (2 mg total) by mouth every evening.  Dispense: 90 tablet; Refill: 1  -     finasteride (PROSCAR) 5 mg tablet; Take 1 tablet (5 mg total) by mouth every evening.  Dispense: 90 tablet; Refill: 1  -     tamsulosin (FLOMAX) 0.4 mg Cap; Take 2 capsules (0.8 mg total) by mouth every evening. DO NOT CRUSH OR CHEW; SWALLOW WHOLE.  Dispense: 180 capsule; Refill: 1              Follow up in about 6 months (around 7/3/2022) for DM & lab review.      1/4/2022 Carmen Chamorro, RONNIE, FNP-C

## 2022-03-09 ENCOUNTER — PES CALL (OUTPATIENT)
Dept: ADMINISTRATIVE | Facility: CLINIC | Age: 77
End: 2022-03-09
Payer: MEDICARE

## 2022-05-23 DIAGNOSIS — I71.40 ABDOMINAL AORTIC ANEURYSM WITHOUT RUPTURE: Primary | ICD-10-CM

## 2022-05-23 DIAGNOSIS — I10 HTN (HYPERTENSION): ICD-10-CM

## 2022-05-23 RX ORDER — SEMAGLUTIDE 1.34 MG/ML
INJECTION, SOLUTION SUBCUTANEOUS
COMMUNITY
Start: 2022-02-02 | End: 2022-07-06

## 2022-05-23 RX ORDER — RIVAROXABAN 2.5 MG/1
TABLET, FILM COATED ORAL
COMMUNITY
Start: 2022-05-08 | End: 2023-02-28

## 2022-05-23 RX ORDER — TRIAMCINOLONE ACETONIDE 1 MG/G
CREAM TOPICAL
COMMUNITY
Start: 2022-03-30 | End: 2022-06-29

## 2022-05-23 RX ORDER — VORAPAXAR 2.08 MG/1
1 TABLET, FILM COATED ORAL DAILY
COMMUNITY
Start: 2022-02-02 | End: 2023-02-28

## 2022-05-23 RX ORDER — EMPAGLIFLOZIN 10 MG/1
TABLET, FILM COATED ORAL
COMMUNITY
Start: 2022-02-02 | End: 2022-07-06

## 2022-05-24 ENCOUNTER — OFFICE VISIT (OUTPATIENT)
Dept: PULMONOLOGY | Facility: CLINIC | Age: 77
End: 2022-05-24
Payer: MEDICARE

## 2022-05-24 VITALS
WEIGHT: 201 LBS | HEART RATE: 75 BPM | BODY MASS INDEX: 28.03 KG/M2 | SYSTOLIC BLOOD PRESSURE: 123 MMHG | DIASTOLIC BLOOD PRESSURE: 80 MMHG | OXYGEN SATURATION: 96 %

## 2022-05-24 DIAGNOSIS — J44.9 CHRONIC OBSTRUCTIVE PULMONARY DISEASE, UNSPECIFIED COPD TYPE: ICD-10-CM

## 2022-05-24 DIAGNOSIS — R93.89 ABNORMAL CT OF THE CHEST: ICD-10-CM

## 2022-05-24 PROCEDURE — 99214 PR OFFICE/OUTPT VISIT, EST, LEVL IV, 30-39 MIN: ICD-10-PCS | Mod: S$GLB,,, | Performed by: INTERNAL MEDICINE

## 2022-05-24 PROCEDURE — 99214 OFFICE O/P EST MOD 30 MIN: CPT | Mod: S$GLB,,, | Performed by: INTERNAL MEDICINE

## 2022-05-24 NOTE — PROGRESS NOTES
Office Visit Note *    Patient Name: Theo Chen Jr.  MRN: [id number]  : 1945      Reason for visit: COPD    HPI:     2020 - Referred here for evaluation of COPD.  Had spirometry done at Dr Donovan's office showing mild/moderate obstruction improved by dilators.  He started on TRELEGY about 3 weeks ago and has noted improvement in his symptoms.  Also has albuterol inhaler but really have used it since starting TRELEGY.  Had nasal procedure done at Dr Donovan's office and has noted improvement in his cough.  Sputum is usually clear to milky white but this has also improved with his recent treatments.  No h/o hemoptysis.  Had recent CT chest (see report below).  He still has some SOB but also better on present meds.  He is overweight and does not exercise regularly.  He has no h/o sleep apnea, denies any significant snoring, no reported history apneas.  Denies excessive somnolence but does have decreased activity after noon.    10/22/2020 - Here for follow up,  Pt is currently stable on present medications with no recent increases in their symptoms or use of rescue medications.  Since our last visit there have been no hospitalizations or ER visits for their respiratory issues and there does not seem to be anything to suggest unrecognized exacerbations.  I have reviewed the medical regimen and re-educated the pt on the role of rescue and controlling medications.  Inhaler technique and understanding seems adequate.  The patient reports no issues with any of there medications for their COPD.  Refills will be taken care of as needed.  All questions answered.  Does get occasional thrush with TRELEGY.  Patient has no known corona virus exposures and has been practicing social distancing.  We have discussed the virus and precautions and all questions have been answered.  Reviewed PFT with pt.    2021 - Here for follow, Pt is currently stable on present medications with no recent increases in their  symptoms or use of rescue medications.  Since our last visit there have been no hospitalizations or ER visits for their respiratory issues and there does not seem to be anything to suggest unrecognized exacerbations.  I have reviewed the medical regimen and re-educated the pt on the role of rescue and controlling medications.  Inhaler technique and understanding seems adequate.  The patient reports no issues with any of there medications for their COPD.  Refills will be taken care of as needed.  All questions answered.  Does have exertional dyspnea and we discussed diet and exercise.  Patient has no known corona virus exposures and has been practicing social distancing.  We have discussed the virus and precautions and all questions have been answered.    10/25/2021 - Here for follow up, Pt is currently stable on present medications with no recent increases in their symptoms or use of rescue medications.  Since our last visit there have been no hospitalizations or ER visits for their respiratory issues and there does not seem to be anything to suggest unrecognized exacerbations.  I have reviewed the medical regimen and re-educated the pt on the role of rescue and controlling medications.  Inhaler technique and understanding seems adequate.  The patient reports no issues with any of there medications for their COPD.  Refills will be taken care of as needed.  All questions answered.  He is not very active (we discussed this).  Patient has no known corona virus exposures and has been practicing social distancing.  We have discussed the virus and precautions and all questions have been answered.  Has had Covid vaccine and booster.    5/24/2022 - Here for follow, Pt is currently stable on present medications with no recent increases in their symptoms or use of rescue medications.  Since our last visit there have been no hospitalizations or ER visits for their respiratory issues and there does not seem to be anything to  suggest unrecognized exacerbations.  I have reviewed the medical regimen and re-educated the pt on the role of rescue and controlling medications.  Inhaler technique and understanding seems adequate.  The patient reports no issues with any of there medications for their COPD.  Refills will be taken care of as needed.  All questions answered.  Feels good.  Patient has no known corona virus exposures and has been practicing social distancing.  We have discussed the virus and precautions and all questions have been answered.  Has had Covid vaccine and booster.We discussed the second booster.          COPD Flowsheet    GOLD A    Last PFT - 10/2020  FEV1- 85 % DLCO - 41 %     + ICS/LABA/LAMA    + prn ALBUTEROL    mMRC -  0 - SOB with strenuous exercise   - + 1 - SOB level ground, slight hill   -  2 - SOB walk slower or stop for breath level ground   -  3 - SOB at 100 yards or after few minutes   -  4 - SOB in house, dressing    Referral to PULMONARY REHABILITATION -  NO    Tested for alpha-1-antitrypsin - NO  Result - na    Vaccination Status    Flu vaccination status - 21/22    Pneumonia vaccination status - current    Covid vaccination status - Pfizer - + booster    Tetanus/diptheria vaccination status - current    Shingles vaccination status - +              Past Medical History    Past Medical History:   Diagnosis Date    COPD (chronic obstructive pulmonary disease)     Diabetes mellitus, type 2     Gastroenteritis     Seasonal allergic rhinitis due to pollen     Shingles        Past Surgical History    Past Surgical History:   Procedure Laterality Date    ABDOMINAL AORTOGRAPHY N/A 9/9/2020    Procedure: Aortogram, Abdominal;  Surgeon: Carlitos Jaime MD;  Location: [facility] CATH/EP LAB;  Service: Cardiology;  Laterality: N/A;    APPENDECTOMY      CATHETERIZATION OF BOTH LEFT AND RIGHT HEART N/A 9/9/2020    Procedure: CATHETERIZATION, HEART, BOTH LEFT AND RIGHT;  Surgeon: Carlitos Jaime MD;  Location: [facility]  CATH/EP LAB;  Service: Cardiology;  Laterality: N/A;    CYSTOSCOPY N/A 6/10/2020    Procedure: CYSTOSCOPY;  Surgeon: Hunter Guerrero Jr., MD;  Location: [facility] OR;  Service: Urology;  Laterality: N/A;    EYE SURGERY Left     TRANSRECTAL ULTRASOUND EXAMINATION N/A 6/10/2020    Procedure: ULTRASOUND, RECTAL APPROACH;  Surgeon: Hunter Guerrero Jr., MD;  Location: [facility] OR;  Service: Urology;  Laterality: N/A;       Medications      Current Outpatient Medications:     aspirin (ECOTRIN) 81 MG EC tablet, Take 1 tablet (81 mg total) by mouth 2 (two) times daily., Disp: , Rfl:     doxazosin (CARDURA) 2 MG tablet, Take 1 tablet (2 mg total) by mouth every evening., Disp: 90 tablet, Rfl: 1    finasteride (PROSCAR) 5 mg tablet, TAKE ONE TABLET BY MOUTH EVERY EVENING., Disp: 90 tablet, Rfl: 1    flash glucose scanning reader (FREESTYLE GIANNI 14 DAY READER) Misc, 1 each by Misc.(Non-Drug; Combo Route) route once daily., Disp: 1 each, Rfl: 0    flash glucose sensor (FREESTYLE GIANNI 14 DAY SENSOR) Kit, 1 each by Misc.(Non-Drug; Combo Route) route every 14 (fourteen) days., Disp: 2 kit, Rfl: 2    fluconazole (DIFLUCAN) 150 MG Tab, Take 1 tab (150 mg) by mouth at first sign of symptomsTHEN take 1 tab (150 mg) after 72 hours from first dose, Disp: 2 tablet, Rfl: 5    fluticasone-umeclidin-vilanter (TRELEGY ELLIPTA) 100-62.5-25 mcg DsDv, Inhale 1 puff into the lungs once daily., Disp: 3 each, Rfl: 3    glipiZIDE (GLUCOTROL) 10 MG tablet, Take 1 tablet (10 mg total) by mouth 2 (two) times daily., Disp: 180 tablet, Rfl: 1    JARDIANCE 10 mg tablet, 1 tablet  once a day, Disp: , Rfl:     lisinopriL 10 MG tablet, Take 1 tablet (10 mg total) by mouth once daily., Disp: 90 tablet, Rfl: 3    metFORMIN (GLUCOPHAGE) 1000 MG tablet, Take 1 tablet (1,000 mg total) by mouth 2 (two) times daily., Disp: 180 tablet, Rfl: 1    metoprolol tartrate (LOPRESSOR) 25 MG tablet, Take 0.5 tablets (12.5 mg total) by mouth 2 (two) times daily., Disp:  90 tablet, Rfl: 3    montelukast (SINGULAIR) 10 mg tablet, TAKE ONE TABLET BY MOUTH ONCE DAILY, Disp: 90 tablet, Rfl: 1    OZEMPIC 0.25 mg or 0.5 mg(2 mg/1.5 mL) pen injector, Inject 0.25 mg subcutaneously once a week as directed Take 0.25mg for 2 weeks then increase to 0.5mg weekly, Disp: , Rfl:     pravastatin (PRAVACHOL) 20 MG tablet, Take 1 tablet (20 mg total) by mouth nightly., Disp: 90 tablet, Rfl: 1    SITagliptin (JANUVIA) 100 MG Tab, Take 1 tablet (100 mg total) by mouth once daily., Disp: 90 tablet, Rfl: 1    tamsulosin (FLOMAX) 0.4 mg Cap, Take 2 capsules (0.8 mg total) by mouth every evening. DO NOT CRUSH OR CHEW; SWALLOW WHOLE., Disp: 180 capsule, Rfl: 1    triamcinolone acetonide 0.1% (KENALOG) 0.1 % cream, , Disp: , Rfl:     XARELTO 2.5 mg Tab, Take by mouth., Disp: , Rfl:     ZONTIVITY 2.08 mg Tab, Take 1 tablet by mouth once daily., Disp: , Rfl:     Allergies    Review of patient's allergies indicates:  No Known Allergies    SocHx    Social History     Tobacco Use   Smoking Status Former Smoker    Packs/day: 2.00    Years: 45.00    Pack years: 90.00    Quit date:     Years since quittin.4   Smokeless Tobacco Never Used       Social History     Substance and Sexual Activity   Alcohol Use No       Drug Use - no  Occupation - retired worked on coin machines  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   HENT: Negative for congestion.    Eyes: Negative for pain.   Respiratory: Positive for cough and shortness of breath. Negative for hemoptysis, sputum production, wheezing and stridor.    Cardiovascular: Negative for chest pain, palpitations, orthopnea, claudication, leg swelling and PND.   Gastrointestinal:  Negative for abdominal pain, blood in stool, constipation, diarrhea, heartburn, nausea and vomiting.   Genitourinary: Negative for dysuria, frequency, hematuria and urgency.   Musculoskeletal: Negative for falls and myalgias.   Neurological: Negative for dizziness, tingling, tremors, sensory change, speech change, focal weakness, seizures, loss of consciousness, weakness and headaches.   Endo/Heme/Allergies: Negative for environmental allergies.   Psychiatric/Behavioral: Negative for depression, substance abuse and suicidal ideas. The patient is not nervous/anxious.        Physical Exam    Vitals:    05/24/22 0951   BP: 123/80   BP Location: Left arm   Patient Position: Sitting   Pulse: 75   SpO2: 96%   Weight: 91.2 kg (201 lb)       Physical Exam  Vitals and nursing note reviewed.   Constitutional:       General: He is not in acute distress.     Appearance: Normal appearance. He is well-developed. He is obese. He is not ill-appearing, toxic-appearing or diaphoretic.   HENT:      Head: Normocephalic and atraumatic.      Right Ear: External ear normal.      Left Ear: External ear normal.      Nose: Nose normal. No congestion or rhinorrhea.   Eyes:      General: No scleral icterus.        Right eye: No discharge.         Left eye: No discharge.      Extraocular Movements: Extraocular movements intact.      Conjunctiva/sclera: Conjunctivae normal.      Pupils: Pupils are equal, round, and reactive to light.   Neck:      Thyroid: No thyromegaly.      Vascular: No JVD.      Trachea: No tracheal deviation.   Cardiovascular:      Rate and Rhythm: Normal rate and regular rhythm.      Pulses: Normal pulses.      Heart sounds: Normal heart sounds. No murmur heard.    No friction rub. No gallop.   Pulmonary:      Effort: Pulmonary effort is normal. No respiratory distress.      Breath sounds: Normal breath sounds. No stridor. No wheezing, rhonchi or rales.   Chest:      Chest wall: No tenderness.   Abdominal:      General:  Bowel sounds are normal. There is no distension.      Palpations: Abdomen is soft.      Tenderness: There is no abdominal tenderness. There is no guarding.   Musculoskeletal:         General: No tenderness. Normal range of motion.      Cervical back: Normal range of motion and neck supple.      Right lower leg: No edema.      Left lower leg: No edema.   Lymphadenopathy:      Cervical: No cervical adenopathy.   Skin:     General: Skin is warm and dry.      Coloration: Skin is not jaundiced.      Findings: No bruising.   Neurological:      General: No focal deficit present.      Mental Status: He is alert and oriented to person, place, and time. Mental status is at baseline.      Cranial Nerves: No cranial nerve deficit.      Deep Tendon Reflexes: Reflexes are normal and symmetric.   Psychiatric:         Mood and Affect: Mood normal.         Behavior: Behavior normal.         Thought Content: Thought content normal.         Judgment: Judgment normal.         Labs    Lab Results   Component Value Date    WBC 8.61 09/04/2020    HGB 14.2 09/04/2020    HCT 43.5 09/04/2020     09/04/2020       Sodium   Date Value Ref Range Status   06/30/2021 136 136 - 145 mmol/L Final     Potassium   Date Value Ref Range Status   06/30/2021 4.3 3.5 - 5.1 mmol/L Final     Chloride   Date Value Ref Range Status   06/30/2021 101 95 - 110 mmol/L Final     CO2   Date Value Ref Range Status   06/30/2021 23 23 - 29 mmol/L Final     Glucose   Date Value Ref Range Status   06/30/2021 197 (H) 70 - 110 mg/dL Final     BUN   Date Value Ref Range Status   06/30/2021 17 8 - 23 mg/dL Final     Creatinine   Date Value Ref Range Status   06/30/2021 1.5 (H) 0.5 - 1.4 mg/dL Final     Calcium   Date Value Ref Range Status   06/30/2021 9.0 8.7 - 10.5 mg/dL Final     Total Protein   Date Value Ref Range Status   06/30/2021 6.9 6.0 - 8.4 g/dL Final     Albumin   Date Value Ref Range Status   06/30/2021 4.1 3.5 - 5.2 g/dL Final     Total Bilirubin   Date  Value Ref Range Status   06/30/2021 1.1 (H) 0.1 - 1.0 mg/dL Final     Comment:     For infants and newborns, interpretation of results should be based  on gestational age, weight and in agreement with clinical  observations.    Premature Infant recommended reference ranges:  Up to 24 hours.............<8.0 mg/dL  Up to 48 hours............<12.0 mg/dL  3-5 days..................<15.0 mg/dL  6-29 days.................<15.0 mg/dL       Alkaline Phosphatase   Date Value Ref Range Status   06/30/2021 51 (L) 55 - 135 U/L Final     AST   Date Value Ref Range Status   06/30/2021 27 10 - 40 U/L Final     ALT   Date Value Ref Range Status   06/30/2021 31 10 - 44 U/L Final     Anion Gap   Date Value Ref Range Status   06/30/2021 12 8 - 16 mmol/L Final       Xrays    PROCEDURE:    CT CHEST WITH CONTRAST  dated  7/10/2020 11:03 AM     CLINICAL HISTORY:   Male 75 years of age.   Shortness of breath     TECHNIQUE: CT images were acquired from the thoracic inlet to the  diaphragm, after the intravenous administration of contrast.     COMPARISON:  None     FINDINGS:     There is well-defined homogeneous fatty attenuating mass involving the  interatrial septum with sparing of the fossa ovalis. Maximal  transverse dimensions on the axial series are 2.3 cm x 4 cm (image 92,  series 3. There is calcification of the left main and anterior  descending coronary artery. On coronal images generated at the  workstation, measures 6 cm in length. This exerts mass effect on the  right superior pulmonary vein and on the left atrium. The superior  vena cava is flattened.     The heart is not enlarged. There is no pericardial effusion. There is  moderate calcified atherosclerotic plaque of the mid aortic arch  through the descending thoracic aorta. Aorta caliber is normal.  Pulmonary arteries are normal.     There is moderate severity panlobular emphysema of the right lung and  mild panlobular emphysema of the left lung.     Airways are patent.  There is no suspicious pulmonary nodule. There is  no acute pulmonary infiltrate. There is no pleural effusion or pleural  thickening. There is no lymphadenopathy. Partially visualized thyroid  gland is normal. The esophagus is normal.     There is no destructive osseous lesion or acute fracture.     IMPRESSION:        1. 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 This flattens the  superior vena cava and exerts mass effect on the left atrium and  superior left pulmonary vein. Cardiology or cardiothoracic surgery  consultation is recommended.  2. There is calcification of the left main/descending coronary artery.  3. There is right worse than left panlobular emphysema. Consultation  with pulmonary medicine is advised this has not occurred previously.     Electronically Signed by Nanette Santiago on 7/10/2020 12:09 PM    Impression/Plan    Problem List Items Addressed This Visit        Pulmonary    COPD (chronic obstructive pulmonary disease)      Continue present medications.   Will refill medications as needed.   Instructed patient to contact us with any issues concerning their medications (cost, reactions, etc.).   Have discussed with patient about inciting conditions which may exacerbate their disease.   We did discuss possible new therapies or de-escalation of therapy (if appropriate).   Asked patient if they were interested in pursuing pulmonary rehabilitation.   All questions answered   RTC 6 months   Patient instructed that they are to call if symptoms change or new issues develop prior to their next visit.                Other    Abnormal CT of the chest     · Last CT scan 7/2020- stable findings                     Raffaele Carrillo MD

## 2022-05-24 NOTE — ASSESSMENT & PLAN NOTE
 Continue present medications.   Will refill medications as needed.   Instructed patient to contact us with any issues concerning their medications (cost, reactions, etc.).   Have discussed with patient about inciting conditions which may exacerbate their disease.   We did discuss possible new therapies or de-escalation of therapy (if appropriate).   Asked patient if they were interested in pursuing pulmonary rehabilitation.   All questions answered   RTC 6 months   Patient instructed that they are to call if symptoms change or new issues develop prior to their next visit.

## 2022-06-14 ENCOUNTER — PATIENT MESSAGE (OUTPATIENT)
Dept: FAMILY MEDICINE | Facility: CLINIC | Age: 77
End: 2022-06-14

## 2022-06-14 DIAGNOSIS — E78.5 DYSLIPIDEMIA: ICD-10-CM

## 2022-06-14 RX ORDER — PRAVASTATIN SODIUM 20 MG/1
20 TABLET ORAL NIGHTLY
Qty: 90 TABLET | Refills: 1 | Status: CANCELLED | OUTPATIENT
Start: 2022-06-14

## 2022-06-29 ENCOUNTER — OFFICE VISIT (OUTPATIENT)
Dept: FAMILY MEDICINE | Facility: CLINIC | Age: 77
End: 2022-06-29
Payer: MEDICARE

## 2022-06-29 VITALS
BODY MASS INDEX: 27.81 KG/M2 | DIASTOLIC BLOOD PRESSURE: 60 MMHG | OXYGEN SATURATION: 96 % | WEIGHT: 198.69 LBS | SYSTOLIC BLOOD PRESSURE: 102 MMHG | HEART RATE: 89 BPM | TEMPERATURE: 98 F | HEIGHT: 71 IN

## 2022-06-29 DIAGNOSIS — L30.9 DERMATITIS: Primary | ICD-10-CM

## 2022-06-29 PROCEDURE — 99214 OFFICE O/P EST MOD 30 MIN: CPT | Mod: S$PBB,,, | Performed by: NURSE PRACTITIONER

## 2022-06-29 PROCEDURE — 99214 PR OFFICE/OUTPT VISIT, EST, LEVL IV, 30-39 MIN: ICD-10-PCS | Mod: S$PBB,,, | Performed by: NURSE PRACTITIONER

## 2022-06-29 PROCEDURE — 99215 OFFICE O/P EST HI 40 MIN: CPT | Performed by: NURSE PRACTITIONER

## 2022-06-29 RX ORDER — TRIAMCINOLONE ACETONIDE 1 MG/G
CREAM TOPICAL 2 TIMES DAILY
Qty: 80 G | Refills: 1 | Status: SHIPPED | OUTPATIENT
Start: 2022-06-29 | End: 2023-03-21 | Stop reason: SDUPTHER

## 2022-06-29 RX ORDER — HYDROCORTISONE 1 %
CREAM (GRAM) TOPICAL 2 TIMES DAILY
COMMUNITY
End: 2023-07-11

## 2022-06-29 RX ORDER — HYDROXYZINE HYDROCHLORIDE 25 MG/1
25 TABLET, FILM COATED ORAL 3 TIMES DAILY PRN
Qty: 30 TABLET | Refills: 0 | Status: SHIPPED | OUTPATIENT
Start: 2022-06-29 | End: 2023-02-28

## 2022-07-06 ENCOUNTER — OFFICE VISIT (OUTPATIENT)
Dept: FAMILY MEDICINE | Facility: CLINIC | Age: 77
End: 2022-07-06
Payer: MEDICARE

## 2022-07-06 VITALS
DIASTOLIC BLOOD PRESSURE: 66 MMHG | BODY MASS INDEX: 28.44 KG/M2 | HEIGHT: 70 IN | HEART RATE: 87 BPM | OXYGEN SATURATION: 97 % | WEIGHT: 198.63 LBS | SYSTOLIC BLOOD PRESSURE: 118 MMHG

## 2022-07-06 DIAGNOSIS — E11.22 TYPE 2 DIABETES MELLITUS WITH STAGE 3B CHRONIC KIDNEY DISEASE, WITHOUT LONG-TERM CURRENT USE OF INSULIN: Primary | ICD-10-CM

## 2022-07-06 DIAGNOSIS — B35.1 TOENAIL FUNGUS: ICD-10-CM

## 2022-07-06 DIAGNOSIS — R35.1 NOCTURIA: ICD-10-CM

## 2022-07-06 DIAGNOSIS — J43.9 PULMONARY EMPHYSEMA, UNSPECIFIED EMPHYSEMA TYPE: ICD-10-CM

## 2022-07-06 DIAGNOSIS — N18.32 TYPE 2 DIABETES MELLITUS WITH STAGE 3B CHRONIC KIDNEY DISEASE, WITHOUT LONG-TERM CURRENT USE OF INSULIN: Primary | ICD-10-CM

## 2022-07-06 DIAGNOSIS — E78.5 DYSLIPIDEMIA: ICD-10-CM

## 2022-07-06 DIAGNOSIS — I10 ESSENTIAL HYPERTENSION: ICD-10-CM

## 2022-07-06 LAB — HBA1C MFR BLD: 6.8 %

## 2022-07-06 PROCEDURE — 99214 OFFICE O/P EST MOD 30 MIN: CPT | Mod: S$PBB,,, | Performed by: NURSE PRACTITIONER

## 2022-07-06 PROCEDURE — 99215 OFFICE O/P EST HI 40 MIN: CPT | Performed by: NURSE PRACTITIONER

## 2022-07-06 PROCEDURE — 83036 HEMOGLOBIN GLYCOSYLATED A1C: CPT | Mod: PBBFAC | Performed by: NURSE PRACTITIONER

## 2022-07-06 PROCEDURE — 99214 PR OFFICE/OUTPT VISIT, EST, LEVL IV, 30-39 MIN: ICD-10-PCS | Mod: S$PBB,,, | Performed by: NURSE PRACTITIONER

## 2022-07-06 RX ORDER — MONTELUKAST SODIUM 10 MG/1
10 TABLET ORAL DAILY
Qty: 90 TABLET | Refills: 1 | Status: SHIPPED | OUTPATIENT
Start: 2022-07-06 | End: 2023-01-05 | Stop reason: SDUPTHER

## 2022-07-06 RX ORDER — DOXAZOSIN 2 MG/1
2 TABLET ORAL NIGHTLY
Qty: 90 TABLET | Refills: 1 | Status: SHIPPED | OUTPATIENT
Start: 2022-07-06 | End: 2022-10-10

## 2022-07-06 RX ORDER — FLUCONAZOLE 150 MG/1
150 TABLET ORAL WEEKLY
Qty: 8 TABLET | Refills: 1 | Status: SHIPPED | OUTPATIENT
Start: 2022-07-06 | End: 2023-02-28 | Stop reason: ALTCHOICE

## 2022-07-06 RX ORDER — METFORMIN HYDROCHLORIDE 1000 MG/1
1000 TABLET ORAL 2 TIMES DAILY
Qty: 180 TABLET | Refills: 1 | Status: SHIPPED | OUTPATIENT
Start: 2022-07-06 | End: 2023-01-05 | Stop reason: SDUPTHER

## 2022-07-06 RX ORDER — FINASTERIDE 5 MG/1
5 TABLET, FILM COATED ORAL NIGHTLY
Qty: 90 TABLET | Refills: 1 | Status: ON HOLD | OUTPATIENT
Start: 2022-07-06 | End: 2022-11-16 | Stop reason: SDUPTHER

## 2022-07-06 RX ORDER — TAMSULOSIN HYDROCHLORIDE 0.4 MG/1
0.8 CAPSULE ORAL NIGHTLY
Qty: 180 CAPSULE | Refills: 1 | Status: ON HOLD | OUTPATIENT
Start: 2022-07-06 | End: 2022-11-16 | Stop reason: SDUPTHER

## 2022-07-06 NOTE — PROGRESS NOTES
SUBJECTIVE:      Patient ID: Theo Chen Jr. is a 77 y.o. male.    Chief Complaint: Diabetes (6 month f/u DM and Lab Review)    Presents for DM recheck & lab orders    Discussed outstanding health maintenance     Diabetes  He presents for his follow-up diabetic visit. He has type 2 diabetes mellitus. No MedicAlert identification noted. His disease course has been stable. Pertinent negatives for hypoglycemia include no confusion, dizziness, headaches, nervousness/anxiousness or pallor. Pertinent negatives for diabetes include no chest pain, no fatigue, no polydipsia, no polyuria and no weakness. There are no hypoglycemic complications. Symptoms are stable. There are no diabetic complications. Risk factors for coronary artery disease include diabetes mellitus, dyslipidemia, family history, hypertension, male sex and sedentary lifestyle. Current diabetic treatment includes oral agent (dual therapy). He is compliant with treatment all of the time. His weight is decreasing steadily. He is following a generally healthy diet. Meal planning includes avoidance of concentrated sweets. He has not had a previous visit with a dietitian. He rarely participates in exercise. An ACE inhibitor/angiotensin II receptor blocker is being taken. He does not see a podiatrist.Eye exam is not current.   Hypertension  This is a chronic problem. The current episode started more than 1 month ago. The problem is controlled. Associated symptoms include shortness of breath (at times, improved on Trelegy). Pertinent negatives include no chest pain, headaches or neck pain. Risk factors for coronary artery disease include diabetes mellitus, dyslipidemia, family history, male gender and sedentary lifestyle. Past treatments include ACE inhibitors, alpha 1 blockers and beta blockers. The current treatment provides significant improvement. Compliance problems include diet and exercise.  Hypertensive end-organ damage includes heart failure.        Past Surgical History:   Procedure Laterality Date    ABDOMINAL AORTOGRAPHY N/A 2020    Procedure: Aortogram, Abdominal;  Surgeon: Carlitos Jaime MD;  Location: [facility] CATH/EP LAB;  Service: Cardiology;  Laterality: N/A;    APPENDECTOMY      CATHETERIZATION OF BOTH LEFT AND RIGHT HEART N/A 2020    Procedure: CATHETERIZATION, HEART, BOTH LEFT AND RIGHT;  Surgeon: Carlitos Jaime MD;  Location: [facility] CATH/EP LAB;  Service: Cardiology;  Laterality: N/A;    CYSTOSCOPY N/A 6/10/2020    Procedure: CYSTOSCOPY;  Surgeon: Hunter Guerrero Jr., MD;  Location: [facility] OR;  Service: Urology;  Laterality: N/A;    EYE SURGERY Left     TRANSRECTAL ULTRASOUND EXAMINATION N/A 6/10/2020    Procedure: ULTRASOUND, RECTAL APPROACH;  Surgeon: Hunter Guerrero Jr., MD;  Location: [facility] OR;  Service: Urology;  Laterality: N/A;     Family History   Problem Relation Age of Onset    Hypertension Mother     Heart disease Father       Social History     Socioeconomic History    Marital status:    Occupational History    Occupation: retired    Tobacco Use    Smoking status: Former Smoker     Packs/day: 2.00     Years: 45.00     Pack years: 90.00     Quit date:      Years since quittin.5    Smokeless tobacco: Never Used   Substance and Sexual Activity    Alcohol use: No    Drug use: No     Current Outpatient Medications   Medication Sig Dispense Refill    aspirin (ECOTRIN) 81 MG EC tablet Take 1 tablet (81 mg total) by mouth 2 (two) times daily.      flash glucose scanning reader (FREESTYLE GIANNI 14 DAY READER) Misc 1 each by Misc.(Non-Drug; Combo Route) route once daily. 1 each 0    flash glucose sensor (FREESTYLE GIANNI 14 DAY SENSOR) Kit 1 each by Misc.(Non-Drug; Combo Route) route every 14 (fourteen) days. 2 kit 2    glipiZIDE (GLUCOTROL) 10 MG tablet TAKE ONE TABLET BY MOUTH TWICE DAILY 180 tablet 1    hydrocortisone 1 % cream Apply topically 2 (two) times daily.      lisinopriL 10 MG tablet Take 1  tablet (10 mg total) by mouth once daily. 90 tablet 3    metoprolol tartrate (LOPRESSOR) 25 MG tablet Take 0.5 tablets (12.5 mg total) by mouth 2 (two) times daily. 90 tablet 3    pravastatin (PRAVACHOL) 20 MG tablet TAKE 1 TABLET BY MOUTH ONCE DAILY AT BEDTIME 90 tablet 1    triamcinolone acetonide 0.1% (KENALOG) 0.1 % cream Apply topically 2 (two) times daily. 80 g 1    XARELTO 2.5 mg Tab Take by mouth.      doxazosin (CARDURA) 2 MG tablet Take 1 tablet (2 mg total) by mouth every evening. 90 tablet 1    finasteride (PROSCAR) 5 mg tablet Take 1 tablet (5 mg total) by mouth every evening. 90 tablet 1    fluconazole (DIFLUCAN) 150 MG Tab Take 1 tablet (150 mg total) by mouth once a week. 8 tablet 1    fluticasone-umeclidin-vilanter (TRELEGY ELLIPTA) 100-62.5-25 mcg DsDv Inhale 1 puff into the lungs once daily. 3 each 3    hydrOXYzine HCL (ATARAX) 25 MG tablet Take 1 tablet (25 mg total) by mouth 3 (three) times daily as needed for Itching. 30 tablet 0    metFORMIN (GLUCOPHAGE) 1000 MG tablet Take 1 tablet (1,000 mg total) by mouth 2 (two) times daily. 180 tablet 1    montelukast (SINGULAIR) 10 mg tablet Take 1 tablet (10 mg total) by mouth once daily. 90 tablet 1    tamsulosin (FLOMAX) 0.4 mg Cap Take 2 capsules (0.8 mg total) by mouth every evening. DO NOT CRUSH OR CHEW; SWALLOW WHOLE. 180 capsule 1    ZONTIVITY 2.08 mg Tab Take 1 tablet by mouth once daily.       No current facility-administered medications for this visit.     Review of patient's allergies indicates:  No Known Allergies   Past Medical History:   Diagnosis Date    COPD (chronic obstructive pulmonary disease)     Diabetes mellitus, type 2     Gastroenteritis     Seasonal allergic rhinitis due to pollen     Shingles      Past Surgical History:   Procedure Laterality Date    ABDOMINAL AORTOGRAPHY N/A 9/9/2020    Procedure: Aortogram, Abdominal;  Surgeon: Carlitos Jaime MD;  Location: [facility] CATH/EP LAB;  Service: Cardiology;   Laterality: N/A;    APPENDECTOMY      CATHETERIZATION OF BOTH LEFT AND RIGHT HEART N/A 9/9/2020    Procedure: CATHETERIZATION, HEART, BOTH LEFT AND RIGHT;  Surgeon: Carlitos Jaime MD;  Location: [facility] CATH/EP LAB;  Service: Cardiology;  Laterality: N/A;    CYSTOSCOPY N/A 6/10/2020    Procedure: CYSTOSCOPY;  Surgeon: Hunter Guerrero Jr., MD;  Location: [facility] OR;  Service: Urology;  Laterality: N/A;    EYE SURGERY Left     TRANSRECTAL ULTRASOUND EXAMINATION N/A 6/10/2020    Procedure: ULTRASOUND, RECTAL APPROACH;  Surgeon: Hunter Guerrero Jr., MD;  Location: [facility] OR;  Service: Urology;  Laterality: N/A;       Review of Systems   Constitutional: Negative for activity change, appetite change, fatigue and fever.   HENT: Negative for congestion, ear pain, hearing loss, postnasal drip, sinus pressure, sinus pain, sneezing and sore throat.         Oral thrush on occasion with Trelegy   Eyes: Negative for photophobia and pain.        Blind in left eye   Respiratory: Positive for shortness of breath (at times, improved on Trelegy). Negative for cough, chest tightness and wheezing.    Cardiovascular: Negative for chest pain and leg swelling.   Gastrointestinal: Negative for abdominal distention, abdominal pain, blood in stool, constipation, diarrhea, nausea and vomiting.   Endocrine: Negative for cold intolerance, heat intolerance, polydipsia and polyuria.   Genitourinary: Negative for difficulty urinating, dysuria, flank pain, frequency, hematuria and urgency.        Nocturia   Musculoskeletal: Negative for arthralgias, back pain, joint swelling, myalgias and neck pain.   Skin: Positive for rash (intermittent yeast). Negative for pallor.   Allergic/Immunologic: Negative for environmental allergies and food allergies.   Neurological: Negative for dizziness, weakness, light-headedness and headaches.   Hematological: Does not bruise/bleed easily.   Psychiatric/Behavioral: Negative for confusion, decreased concentration and  "sleep disturbance. The patient is not nervous/anxious.       OBJECTIVE:      Vitals:    07/06/22 0922   BP: 118/66   BP Location: Right arm   Patient Position: Sitting   BP Method: Large (Manual)   Pulse: 87   SpO2: 97%   Weight: 90.1 kg (198 lb 9.6 oz)   Height: 5' 9.5" (1.765 m)     Physical Exam  Vitals and nursing note reviewed.   Constitutional:       General: He is not in acute distress.     Appearance: Normal appearance. He is well-developed. He is obese.      Comments: 6# loss since January visit, 11# loss since June 2021 visit   HENT:      Head: Normocephalic and atraumatic.      Right Ear: Hearing normal.      Left Ear: Hearing normal.      Nose: Nose normal. No rhinorrhea.   Eyes:      General: Lids are normal.         Right eye: No discharge.         Left eye: No discharge.      Conjunctiva/sclera: Conjunctivae normal.      Right eye: Right conjunctiva is not injected.      Left eye: Left conjunctiva is not injected.      Pupils: Pupils are equal, round, and reactive to light. Pupils are equal.      Right eye: Pupil is round and reactive.      Left eye: Pupil is round and reactive.      Comments: Exotropia left eye   Neck:      Thyroid: No thyromegaly.      Vascular: No JVD.      Trachea: Trachea normal. No tracheal deviation.   Cardiovascular:      Rate and Rhythm: Normal rate and regular rhythm.      Pulses:           Radial pulses are 2+ on the right side and 2+ on the left side.        Dorsalis pedis pulses are 2+ on the right side and 2+ on the left side.        Posterior tibial pulses are 2+ on the right side and 2+ on the left side.      Heart sounds: Normal heart sounds. No murmur heard.    No friction rub. No gallop.   Pulmonary:      Effort: Pulmonary effort is normal. No respiratory distress.      Breath sounds: Normal breath sounds. No stridor. No decreased breath sounds, wheezing, rhonchi or rales.   Abdominal:      General: Bowel sounds are normal. There is no distension.      Palpations: " Abdomen is soft. Abdomen is not rigid.      Tenderness: There is no abdominal tenderness. There is no guarding.   Musculoskeletal:         General: Normal range of motion.      Cervical back: Normal range of motion and neck supple.   Feet:      Right foot:      Protective Sensation: 8 sites tested. 7 sites sensed.      Toenail Condition: Fungal disease present.     Left foot:      Protective Sensation: 8 sites tested. 7 sites sensed.      Toenail Condition: Fungal disease present.     Comments: Lack of hair to BLE  Lymphadenopathy:      Cervical: No cervical adenopathy.   Skin:     General: Skin is warm and dry.      Capillary Refill: Capillary refill takes less than 2 seconds.      Coloration: Skin is not pale.      Findings: No lesion or rash.   Neurological:      Mental Status: He is alert and oriented to person, place, and time.      Motor: No atrophy.      Coordination: Coordination normal.      Gait: Gait normal.   Psychiatric:         Attention and Perception: He is attentive.         Speech: Speech normal.         Behavior: Behavior normal.         Thought Content: Thought content normal.         Judgment: Judgment normal.        Assessment:       1. Type 2 diabetes mellitus with stage 3b chronic kidney disease, without long-term current use of insulin    2. Essential hypertension    3. Dyslipidemia    4. Nocturia    5. Pulmonary emphysema, unspecified emphysema type    6. Toenail fungus        Plan:       Type 2 diabetes mellitus with stage 3b chronic kidney disease, without long-term current use of insulin  -     POCT HEMOGLOBIN A1C  -     TSH; Future; Expected date: 07/06/2022  -     Microalbumin/Creatinine Ratio, Urine; Future; Expected date: 07/06/2022  -     metFORMIN (GLUCOPHAGE) 1000 MG tablet; Take 1 tablet (1,000 mg total) by mouth 2 (two) times daily.  Dispense: 180 tablet; Refill: 1    Essential hypertension  -     CBC Auto Differential; Future; Expected date: 07/13/2022  -     Comprehensive  Metabolic Panel; Future; Expected date: 07/06/2022  -     Lipid Panel; Future; Expected date: 07/06/2022  -     doxazosin (CARDURA) 2 MG tablet; Take 1 tablet (2 mg total) by mouth every evening.  Dispense: 90 tablet; Refill: 1    Dyslipidemia  -     Lipid Panel; Future; Expected date: 07/06/2022    Nocturia  -     doxazosin (CARDURA) 2 MG tablet; Take 1 tablet (2 mg total) by mouth every evening.  Dispense: 90 tablet; Refill: 1  -     finasteride (PROSCAR) 5 mg tablet; Take 1 tablet (5 mg total) by mouth every evening.  Dispense: 90 tablet; Refill: 1  -     tamsulosin (FLOMAX) 0.4 mg Cap; Take 2 capsules (0.8 mg total) by mouth every evening. DO NOT CRUSH OR CHEW; SWALLOW WHOLE.  Dispense: 180 capsule; Refill: 1    Pulmonary emphysema, unspecified emphysema type  -     montelukast (SINGULAIR) 10 mg tablet; Take 1 tablet (10 mg total) by mouth once daily.  Dispense: 90 tablet; Refill: 1    Toenail fungus  -     fluconazole (DIFLUCAN) 150 MG Tab; Take 1 tablet (150 mg total) by mouth once a week.  Dispense: 8 tablet; Refill: 1              Follow up in about 6 months (around 1/6/2023) for DM.      7/6/2022 RONNIE Lopez, FNP-C

## 2022-11-02 ENCOUNTER — OFFICE VISIT (OUTPATIENT)
Dept: UROLOGY | Facility: CLINIC | Age: 77
End: 2022-11-02
Payer: MEDICARE

## 2022-11-02 VITALS
BODY MASS INDEX: 29.62 KG/M2 | WEIGHT: 200 LBS | SYSTOLIC BLOOD PRESSURE: 138 MMHG | DIASTOLIC BLOOD PRESSURE: 84 MMHG | HEART RATE: 91 BPM | HEIGHT: 69 IN

## 2022-11-02 DIAGNOSIS — R39.9 LOWER URINARY TRACT SYMPTOMS (LUTS): Primary | ICD-10-CM

## 2022-11-02 LAB
BILIRUBIN, UA POC OHS: NEGATIVE
BLOOD, UA POC OHS: NEGATIVE
CLARITY, UA POC OHS: CLEAR
COLOR, UA POC OHS: YELLOW
GLUCOSE, UA POC OHS: 500
KETONES, UA POC OHS: NEGATIVE
LEUKOCYTES, UA POC OHS: NEGATIVE
NITRITE, UA POC OHS: NEGATIVE
PH, UA POC OHS: 5.5
POC RESIDUAL URINE VOLUME: 378 ML (ref 0–100)
PROTEIN, UA POC OHS: NEGATIVE
SPECIFIC GRAVITY, UA POC OHS: 1.02
UROBILINOGEN, UA POC OHS: 0.2

## 2022-11-02 PROCEDURE — 99999 PR PBB SHADOW E&M-EST. PATIENT-LVL III: CPT | Mod: PBBFAC,,, | Performed by: UROLOGY

## 2022-11-02 PROCEDURE — 99214 PR OFFICE/OUTPT VISIT, EST, LEVL IV, 30-39 MIN: ICD-10-PCS | Mod: S$PBB,,, | Performed by: UROLOGY

## 2022-11-02 PROCEDURE — 99214 OFFICE O/P EST MOD 30 MIN: CPT | Mod: S$PBB,,, | Performed by: UROLOGY

## 2022-11-02 PROCEDURE — 99999 PR PBB SHADOW E&M-EST. PATIENT-LVL III: ICD-10-PCS | Mod: PBBFAC,,, | Performed by: UROLOGY

## 2022-11-02 PROCEDURE — 51798 US URINE CAPACITY MEASURE: CPT | Mod: PBBFAC,PN | Performed by: UROLOGY

## 2022-11-02 PROCEDURE — 81003 URINALYSIS AUTO W/O SCOPE: CPT | Mod: PBBFAC,PN | Performed by: UROLOGY

## 2022-11-02 PROCEDURE — 99213 OFFICE O/P EST LOW 20 MIN: CPT | Mod: PBBFAC,PN | Performed by: UROLOGY

## 2022-11-02 RX ORDER — SITAGLIPTIN 100 MG/1
100 TABLET, FILM COATED ORAL DAILY
COMMUNITY
Start: 2022-09-07 | End: 2022-12-08

## 2022-11-02 NOTE — H&P (VIEW-ONLY)
[facility] Established Patient/H&P:    Theo Chen Jr. is a 77 y.o. male who presents for follow up for lower urinary tract symptoms.     Patient complains of nocturia x 4, frequency and incomplete emptying for approximately 1 year refractory to Flomax 0.8 MG PO daily and Finasteride 5 mg.      He is extremely bothered by his symptoms and does not notice any relief with medications.     Of note, he has required a pacheco catheter in the past for urinary retention.       Interval History    11/2/22: He underwent cystoscopy and TRUS on 6/10/20 which revealed a 30 cc prostate with coaptation of bilateral lobes, large bladder volume. Plan was for possible Rezum, however patient was lost to follow up due to the COVID pandemic.     Presents now complaining of severe lower urinary tract symptoms - incomplete emptying, frequency, urgency, intermittency, weak stream and nocturia x 4. Drinks only water and tea. He remains on Flomax 0.8 mg and Finasteride 5 mg PO daily. Bothered by his symptoms. Urine dipstick only with glucose today.      Patient denies any fever, chills, dysuria, urinary tract infection, recent  trauma or history of  malignancy.         PSA  0.7                   5/7/19    IPSS QoL  30 5 11/2/22    PVR  378 mL  11/2/22  360 mL 6/16/20    Urine culture  No growth 11/2/22      Past Medical History:   Diagnosis Date    COPD (chronic obstructive pulmonary disease)     Diabetes mellitus, type 2     Gastroenteritis     Seasonal allergic rhinitis due to pollen     Shingles        Past Surgical History:   Procedure Laterality Date    ABDOMINAL AORTOGRAPHY N/A 9/9/2020    Procedure: Aortogram, Abdominal;  Surgeon: Carlitos Jaime MD;  Location: [facility] CATH/EP LAB;  Service: Cardiology;  Laterality: N/A;    APPENDECTOMY      CATHETERIZATION OF BOTH LEFT AND RIGHT HEART N/A 9/9/2020    Procedure: CATHETERIZATION, HEART, BOTH LEFT AND RIGHT;  Surgeon: Carlitos Jaime MD;  Location: [facility] CATH/EP  "LAB;  Service: Cardiology;  Laterality: N/A;    CYSTOSCOPY N/A 6/10/2020    Procedure: CYSTOSCOPY;  Surgeon: Hunter Guerrero Jr., MD;  Location: [facility] OR;  Service: Urology;  Laterality: N/A;    EYE SURGERY Left     TRANSRECTAL ULTRASOUND EXAMINATION N/A 6/10/2020    Procedure: ULTRASOUND, RECTAL APPROACH;  Surgeon: Hunter Guerrero Jr., MD;  Location: [facility] OR;  Service: Urology;  Laterality: N/A;       Review of patient's allergies indicates:  No Known Allergies    Medications Reviewed: see MAR    FOCUSED PHYSICAL EXAM:    Vitals:    11/02/22 0931   BP: 138/84   Pulse: 91     Body mass index is 29.53 kg/m². Weight: 90.7 kg (200 lb) Height: 5' 9" (175.3 cm)       General: Alert, cooperative, no distress, appears stated age  Abdomen: Soft, non-tender, no CVA tenderness, non-distended        LABS:    Recent Results (from the past 336 hour(s))   POCT Urinalysis(Instrument)    Collection Time: 11/02/22  9:38 AM   Result Value Ref Range    Color, POC UA Yellow Yellow, Straw, Colorless    Clarity, POC UA Clear Clear    Glucose, POC  (A) Negative    Bilirubin, POC UA Negative Negative    Ketones, POC UA Negative Negative    Spec Grav POC UA 1.025 1.005 - 1.030    Blood, POC UA Negative Negative    pH, POC UA 5.5 5.0 - 8.0    Protein, POC UA Negative Negative    Urobilinogen, POC UA 0.2 <=1.0    Nitrite, POC UA Negative Negative    WBC, POC UA Negative Negative   POCT Bladder Scan    Collection Time: 11/02/22  9:38 AM   Result Value Ref Range    POC Residual Urine Volume 378 (A) 0 - 100 mL           Assessment/Diagnosis:    1. Lower urinary tract symptoms (LUTS)  POCT Urinalysis(Instrument)    POCT Bladder Scan    Place in Outpatient    Case Request Operating Room: CYSTOSCOPY, ULTRASOUND, RECTAL APPROACH    Reason for no VTE Prophylaxis          Plans:    - I spent 30 minutes of the day of this encounter preparing for, treating and managing this patient. Extensive discussion with patient regarding the etiology and " management of his lower urinary tract symptoms. Explained that LUTS are multifactorial and can be secondary to an enlarged prostate, PO intake of bladder irritants, overactive bladder, constipation, malignancy, trauma, infection, stones or medications. We discussed that there is a potential benefit to repeat cystoscopy and TRUS as it has been almost 2.5 years since his last evaluation - scheduled on 11/16/22. Further plan contingent on results.   - Offered catheter vs CIC given his persistently elevated PVRs, but patient declined.   - Continue Flomax 0.8 mg and Finasteride 5 mg PO daily.

## 2022-11-02 NOTE — PROGRESS NOTES
[facility] Established Patient/H&P:    Theo Chen Jr. is a 77 y.o. male who presents for follow up for lower urinary tract symptoms.     Patient complains of nocturia x 4, frequency and incomplete emptying for approximately 1 year refractory to Flomax 0.8 MG PO daily and Finasteride 5 mg.      He is extremely bothered by his symptoms and does not notice any relief with medications.     Of note, he has required a pacheco catheter in the past for urinary retention.       Interval History    11/2/22: He underwent cystoscopy and TRUS on 6/10/20 which revealed a 30 cc prostate with coaptation of bilateral lobes, large bladder volume. Plan was for possible Rezum, however patient was lost to follow up due to the COVID pandemic.     Presents now complaining of severe lower urinary tract symptoms - incomplete emptying, frequency, urgency, intermittency, weak stream and nocturia x 4. Drinks only water and tea. He remains on Flomax 0.8 mg and Finasteride 5 mg PO daily. Bothered by his symptoms. Urine dipstick only with glucose today.      Patient denies any fever, chills, dysuria, urinary tract infection, recent  trauma or history of  malignancy.         PSA  0.7                   5/7/19    IPSS QoL  30 5 11/2/22    PVR  378 mL  11/2/22  360 mL 6/16/20    Urine culture  No growth 11/2/22      Past Medical History:   Diagnosis Date    COPD (chronic obstructive pulmonary disease)     Diabetes mellitus, type 2     Gastroenteritis     Seasonal allergic rhinitis due to pollen     Shingles        Past Surgical History:   Procedure Laterality Date    ABDOMINAL AORTOGRAPHY N/A 9/9/2020    Procedure: Aortogram, Abdominal;  Surgeon: Carlitos Jaime MD;  Location: [facility] CATH/EP LAB;  Service: Cardiology;  Laterality: N/A;    APPENDECTOMY      CATHETERIZATION OF BOTH LEFT AND RIGHT HEART N/A 9/9/2020    Procedure: CATHETERIZATION, HEART, BOTH LEFT AND RIGHT;  Surgeon: Carlitos Jaime MD;  Location: [facility] CATH/EP  "LAB;  Service: Cardiology;  Laterality: N/A;    CYSTOSCOPY N/A 6/10/2020    Procedure: CYSTOSCOPY;  Surgeon: Hunter Guerrero Jr., MD;  Location: [facility] OR;  Service: Urology;  Laterality: N/A;    EYE SURGERY Left     TRANSRECTAL ULTRASOUND EXAMINATION N/A 6/10/2020    Procedure: ULTRASOUND, RECTAL APPROACH;  Surgeon: Hunter Guerrero Jr., MD;  Location: [facility] OR;  Service: Urology;  Laterality: N/A;       Review of patient's allergies indicates:  No Known Allergies    Medications Reviewed: see MAR    FOCUSED PHYSICAL EXAM:    Vitals:    11/02/22 0931   BP: 138/84   Pulse: 91     Body mass index is 29.53 kg/m². Weight: 90.7 kg (200 lb) Height: 5' 9" (175.3 cm)       General: Alert, cooperative, no distress, appears stated age  Abdomen: Soft, non-tender, no CVA tenderness, non-distended        LABS:    Recent Results (from the past 336 hour(s))   POCT Urinalysis(Instrument)    Collection Time: 11/02/22  9:38 AM   Result Value Ref Range    Color, POC UA Yellow Yellow, Straw, Colorless    Clarity, POC UA Clear Clear    Glucose, POC  (A) Negative    Bilirubin, POC UA Negative Negative    Ketones, POC UA Negative Negative    Spec Grav POC UA 1.025 1.005 - 1.030    Blood, POC UA Negative Negative    pH, POC UA 5.5 5.0 - 8.0    Protein, POC UA Negative Negative    Urobilinogen, POC UA 0.2 <=1.0    Nitrite, POC UA Negative Negative    WBC, POC UA Negative Negative   POCT Bladder Scan    Collection Time: 11/02/22  9:38 AM   Result Value Ref Range    POC Residual Urine Volume 378 (A) 0 - 100 mL           Assessment/Diagnosis:    1. Lower urinary tract symptoms (LUTS)  POCT Urinalysis(Instrument)    POCT Bladder Scan    Place in Outpatient    Case Request Operating Room: CYSTOSCOPY, ULTRASOUND, RECTAL APPROACH    Reason for no VTE Prophylaxis          Plans:    - I spent 30 minutes of the day of this encounter preparing for, treating and managing this patient. Extensive discussion with patient regarding the etiology and " management of his lower urinary tract symptoms. Explained that LUTS are multifactorial and can be secondary to an enlarged prostate, PO intake of bladder irritants, overactive bladder, constipation, malignancy, trauma, infection, stones or medications. We discussed that there is a potential benefit to repeat cystoscopy and TRUS as it has been almost 2.5 years since his last evaluation - scheduled on 11/16/22. Further plan contingent on results.   - Offered catheter vs CIC given his persistently elevated PVRs, but patient declined.   - Continue Flomax 0.8 mg and Finasteride 5 mg PO daily.

## 2022-11-14 ENCOUNTER — OFFICE VISIT (OUTPATIENT)
Dept: PULMONOLOGY | Facility: CLINIC | Age: 77
End: 2022-11-14
Payer: MEDICARE

## 2022-11-14 VITALS
DIASTOLIC BLOOD PRESSURE: 76 MMHG | WEIGHT: 200.81 LBS | BODY MASS INDEX: 29.65 KG/M2 | OXYGEN SATURATION: 93 % | SYSTOLIC BLOOD PRESSURE: 140 MMHG | HEART RATE: 86 BPM

## 2022-11-14 DIAGNOSIS — R93.89 ABNORMAL CT OF THE CHEST: ICD-10-CM

## 2022-11-14 DIAGNOSIS — J44.9 CHRONIC OBSTRUCTIVE PULMONARY DISEASE, UNSPECIFIED COPD TYPE: ICD-10-CM

## 2022-11-14 PROCEDURE — 99214 PR OFFICE/OUTPT VISIT, EST, LEVL IV, 30-39 MIN: ICD-10-PCS | Mod: S$GLB,,, | Performed by: INTERNAL MEDICINE

## 2022-11-14 PROCEDURE — 99214 OFFICE O/P EST MOD 30 MIN: CPT | Mod: S$GLB,,, | Performed by: INTERNAL MEDICINE

## 2022-11-14 NOTE — PROGRESS NOTES
Office Visit Note *    Patient Name: Theo Chen Jr.  MRN: [id number]  : 1945      Reason for visit: COPD    HPI:     2020 - Referred here for evaluation of COPD.  Had spirometry done at Dr Donovan's office showing mild/moderate obstruction improved by dilators.  He started on TRELEGY about 3 weeks ago and has noted improvement in his symptoms.  Also has albuterol inhaler but really have used it since starting TRELEGY.  Had nasal procedure done at Dr Donovan's office and has noted improvement in his cough.  Sputum is usually clear to milky white but this has also improved with his recent treatments.  No h/o hemoptysis.  Had recent CT chest (see report below).  He still has some SOB but also better on present meds.  He is overweight and does not exercise regularly.  He has no h/o sleep apnea, denies any significant snoring, no reported history apneas.  Denies excessive somnolence but does have decreased activity after noon.    10/22/2020 - Here for follow up,  Pt is currently stable on present medications with no recent increases in their symptoms or use of rescue medications.  Since our last visit there have been no hospitalizations or ER visits for their respiratory issues and there does not seem to be anything to suggest unrecognized exacerbations.  I have reviewed the medical regimen and re-educated the pt on the role of rescue and controlling medications.  Inhaler technique and understanding seems adequate.  The patient reports no issues with any of there medications for their COPD.  Refills will be taken care of as needed.  All questions answered.  Does get occasional thrush with TRELEGY.  Patient has no known corona virus exposures and has been practicing social distancing.  We have discussed the virus and precautions and all questions have been answered.  Reviewed PFT with pt.    2021 - Here for follow, Pt is currently stable on present medications with no recent increases in their  symptoms or use of rescue medications.  Since our last visit there have been no hospitalizations or ER visits for their respiratory issues and there does not seem to be anything to suggest unrecognized exacerbations.  I have reviewed the medical regimen and re-educated the pt on the role of rescue and controlling medications.  Inhaler technique and understanding seems adequate.  The patient reports no issues with any of there medications for their COPD.  Refills will be taken care of as needed.  All questions answered.  Does have exertional dyspnea and we discussed diet and exercise.  Patient has no known corona virus exposures and has been practicing social distancing.  We have discussed the virus and precautions and all questions have been answered.    10/25/2021 - Here for follow up, Pt is currently stable on present medications with no recent increases in their symptoms or use of rescue medications.  Since our last visit there have been no hospitalizations or ER visits for their respiratory issues and there does not seem to be anything to suggest unrecognized exacerbations.  I have reviewed the medical regimen and re-educated the pt on the role of rescue and controlling medications.  Inhaler technique and understanding seems adequate.  The patient reports no issues with any of there medications for their COPD.  Refills will be taken care of as needed.  All questions answered.  He is not very active (we discussed this).  Patient has no known corona virus exposures and has been practicing social distancing.  We have discussed the virus and precautions and all questions have been answered.  Has had Covid vaccine and booster.    5/24/2022 - Here for follow, Pt is currently stable on present medications with no recent increases in their symptoms or use of rescue medications.  Since our last visit there have been no hospitalizations or ER visits for their respiratory issues and there does not seem to be anything to  suggest unrecognized exacerbations.  I have reviewed the medical regimen and re-educated the pt on the role of rescue and controlling medications.  Inhaler technique and understanding seems adequate.  The patient reports no issues with any of there medications for their COPD.  Refills will be taken care of as needed.  All questions answered.  Feels good.  Patient has no known corona virus exposures and has been practicing social distancing.  We have discussed the virus and precautions and all questions have been answered.  Has had Covid vaccine and booster.We discussed the second booster.    11/14/2022 - Here for follow up, Pt is currently stable on present medications with no recent increases in their symptoms or use of rescue medications.  Since our last visit there have been no hospitalizations or ER visits for their respiratory issues and there does not seem to be anything to suggest unrecognized exacerbations.  I have reviewed the medical regimen and re-educated the pt on the role of rescue and controlling medications.  Inhaler technique and understanding seems adequate.  The patient reports no issues with any of there medications for their COPD.  Refills will be taken care of as needed.  All questions answered.  He is to have prostate procedure this week.  Patient has no known corona virus exposures and has been practicing social distancing.  We have discussed the virus and precautions and all questions have been answered.  He denies any hospitalizations or new problems.          COPD Flowsheet    GOLD A    Last PFT - 10/2020  FEV1- 85 % DLCO - 41 %     + ICS/LABA/LAMA    + prn ALBUTEROL    mMRC -  0 - SOB with strenuous exercise   - + 1 - SOB level ground, slight hill   -  2 - SOB walk slower or stop for breath level ground   -  3 - SOB at 100 yards or after few minutes   -  4 - SOB in house, dressing    Referral to PULMONARY REHABILITATION -  NO    Tested for alpha-1-antitrypsin - NO  Result -  na    Vaccination Status    Flu vaccination status - 21/22    Pneumonia vaccination status - current    Covid vaccination status - Pfizer - + booster    Tetanus/diptheria vaccination status - current    Shingles vaccination status - +              Past Medical History    Past Medical History:   Diagnosis Date    COPD (chronic obstructive pulmonary disease)     Diabetes mellitus, type 2     Gastroenteritis     Seasonal allergic rhinitis due to pollen     Shingles        Past Surgical History    Past Surgical History:   Procedure Laterality Date    ABDOMINAL AORTOGRAPHY N/A 9/9/2020    Procedure: Aortogram, Abdominal;  Surgeon: Carlitos Jaime MD;  Location: [facility] CATH/EP LAB;  Service: Cardiology;  Laterality: N/A;    APPENDECTOMY      CATHETERIZATION OF BOTH LEFT AND RIGHT HEART N/A 9/9/2020    Procedure: CATHETERIZATION, HEART, BOTH LEFT AND RIGHT;  Surgeon: Carlitos Jaime MD;  Location: [facility] CATH/EP LAB;  Service: Cardiology;  Laterality: N/A;    CYSTOSCOPY N/A 6/10/2020    Procedure: CYSTOSCOPY;  Surgeon: Hunter Guerrero Jr., MD;  Location: [facility] OR;  Service: Urology;  Laterality: N/A;    EYE SURGERY Left     TRANSRECTAL ULTRASOUND EXAMINATION N/A 6/10/2020    Procedure: ULTRASOUND, RECTAL APPROACH;  Surgeon: Hunter Guerrero Jr., MD;  Location: [facility] OR;  Service: Urology;  Laterality: N/A;       Medications      Current Outpatient Medications:     aspirin (ECOTRIN) 81 MG EC tablet, Take 1 tablet (81 mg total) by mouth 2 (two) times daily., Disp: , Rfl:     doxazosin (CARDURA) 2 MG tablet, TAKE ONE TABLET BY MOUTH EVERY EVENING., Disp: 90 tablet, Rfl: 1    finasteride (PROSCAR) 5 mg tablet, Take 1 tablet (5 mg total) by mouth every evening., Disp: 90 tablet, Rfl: 1    fluconazole (DIFLUCAN) 150 MG Tab, Take 1 tablet (150 mg total) by mouth once a week., Disp: 8 tablet, Rfl: 1    glipiZIDE (GLUCOTROL) 10 MG tablet, TAKE ONE TABLET BY MOUTH TWICE DAILY, Disp: 180 tablet, Rfl: 1    hydrocortisone 1 % cream, Apply  topically 2 (two) times daily., Disp: , Rfl:     JANUVIA 100 mg Tab, Take 100 mg by mouth once daily., Disp: , Rfl:     lisinopriL 10 MG tablet, Take 1 tablet (10 mg total) by mouth once daily., Disp: 90 tablet, Rfl: 3    metFORMIN (GLUCOPHAGE) 1000 MG tablet, Take 1 tablet (1,000 mg total) by mouth 2 (two) times daily., Disp: 180 tablet, Rfl: 1    metoprolol tartrate (LOPRESSOR) 25 MG tablet, Take 0.5 tablets (12.5 mg total) by mouth 2 (two) times daily., Disp: 90 tablet, Rfl: 3    montelukast (SINGULAIR) 10 mg tablet, Take 1 tablet (10 mg total) by mouth once daily., Disp: 90 tablet, Rfl: 1    pravastatin (PRAVACHOL) 20 MG tablet, TAKE 1 TABLET BY MOUTH ONCE DAILY AT BEDTIME, Disp: 90 tablet, Rfl: 1    tamsulosin (FLOMAX) 0.4 mg Cap, Take 2 capsules (0.8 mg total) by mouth every evening. DO NOT CRUSH OR CHEW; SWALLOW WHOLE., Disp: 180 capsule, Rfl: 1    XARELTO 2.5 mg Tab, Take by mouth., Disp: , Rfl:     ZONTIVITY 2.08 mg Tab, Take 1 tablet by mouth once daily., Disp: , Rfl:     flash glucose scanning reader (FREESTYLE GIANNI 14 DAY READER) Misc, 1 each by Misc.(Non-Drug; Combo Route) route once daily., Disp: 1 each, Rfl: 0    flash glucose sensor (FREESTYLE GIANNI 14 DAY SENSOR) Kit, 1 each by Misc.(Non-Drug; Combo Route) route every 14 (fourteen) days., Disp: 2 kit, Rfl: 2    fluticasone-umeclidin-vilanter (TRELEGY ELLIPTA) 100-62.5-25 mcg DsDv, Inhale 1 puff into the lungs once daily. (Patient not taking: Reported on 11/14/2022), Disp: 3 each, Rfl: 3    hydrOXYzine HCL (ATARAX) 25 MG tablet, Take 1 tablet (25 mg total) by mouth 3 (three) times daily as needed for Itching. (Patient not taking: Reported on 11/14/2022), Disp: 30 tablet, Rfl: 0    triamcinolone acetonide 0.1% (KENALOG) 0.1 % cream, Apply topically 2 (two) times daily. 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   HENT:  Negative for congestion.    Eyes:  Negative for pain.   Respiratory:  Positive for cough and shortness of breath. Negative for hemoptysis, sputum production, wheezing and stridor.    Cardiovascular:  Negative for chest pain, palpitations, orthopnea, claudication, leg swelling and PND.   Gastrointestinal:  Negative for abdominal pain, blood in stool, constipation, diarrhea, heartburn, nausea and vomiting.   Genitourinary:  Negative for dysuria, frequency, hematuria and urgency.   Musculoskeletal:  Negative for falls and myalgias.   Neurological:  Negative for dizziness, tingling, tremors, sensory change, speech change, focal weakness, seizures, loss of consciousness, weakness and headaches.   Endo/Heme/Allergies:  Negative for environmental allergies.   Psychiatric/Behavioral:  Negative for depression, substance abuse and suicidal ideas. The patient is not nervous/anxious.      Physical Exam    Vitals:    22 0857   BP: (!) 140/76   BP Location: Left arm   Patient Position: Sitting   BP Method: Medium (Manual)   Pulse: 86   SpO2: (!) 93%   Weight: 91.1 kg (200 lb 12.8 oz)       Physical Exam  Vitals and nursing note reviewed.   Constitutional:       General: He is not in acute distress.     Appearance: Normal appearance. He is well-developed. He is obese. He is not ill-appearing,  toxic-appearing or diaphoretic.   HENT:      Head: Normocephalic and atraumatic.      Right Ear: External ear normal.      Left Ear: External ear normal.      Nose: Nose normal. No congestion or rhinorrhea.   Eyes:      General: No scleral icterus.        Right eye: No discharge.         Left eye: No discharge.      Extraocular Movements: Extraocular movements intact.      Conjunctiva/sclera: Conjunctivae normal.      Pupils: Pupils are equal, round, and reactive to light.   Neck:      Thyroid: No thyromegaly.      Vascular: No JVD.      Trachea: No tracheal deviation.   Cardiovascular:      Rate and Rhythm: Normal rate and regular rhythm.      Pulses: Normal pulses.      Heart sounds: Normal heart sounds. No murmur heard.    No friction rub. No gallop.   Pulmonary:      Effort: Pulmonary effort is normal. No respiratory distress.      Breath sounds: Normal breath sounds. No stridor. No wheezing, rhonchi or rales.   Chest:      Chest wall: No tenderness.   Abdominal:      General: Bowel sounds are normal. There is no distension.      Palpations: Abdomen is soft.      Tenderness: There is no abdominal tenderness. There is no guarding.   Musculoskeletal:         General: No tenderness. Normal range of motion.      Cervical back: Normal range of motion and neck supple.      Right lower leg: No edema.      Left lower leg: No edema.   Lymphadenopathy:      Cervical: No cervical adenopathy.   Skin:     General: Skin is warm and dry.      Coloration: Skin is not jaundiced.      Findings: No bruising.   Neurological:      General: No focal deficit present.      Mental Status: He is alert and oriented to person, place, and time. Mental status is at baseline.      Cranial Nerves: No cranial nerve deficit.      Deep Tendon Reflexes: Reflexes are normal and symmetric.   Psychiatric:         Mood and Affect: Mood normal.         Behavior: Behavior normal.         Thought Content: Thought content normal.         Judgment:  Judgment normal.       Labs    Lab Results   Component Value Date    WBC 8.61 09/04/2020    HGB 14.2 09/04/2020    HCT 43.5 09/04/2020     09/04/2020       Sodium   Date Value Ref Range Status   06/30/2021 136 136 - 145 mmol/L Final     Potassium   Date Value Ref Range Status   06/30/2021 4.3 3.5 - 5.1 mmol/L Final     Chloride   Date Value Ref Range Status   06/30/2021 101 95 - 110 mmol/L Final     CO2   Date Value Ref Range Status   06/30/2021 23 23 - 29 mmol/L Final     Glucose   Date Value Ref Range Status   06/30/2021 197 (H) 70 - 110 mg/dL Final     BUN   Date Value Ref Range Status   06/30/2021 17 8 - 23 mg/dL Final     Creatinine   Date Value Ref Range Status   06/30/2021 1.5 (H) 0.5 - 1.4 mg/dL Final     Calcium   Date Value Ref Range Status   06/30/2021 9.0 8.7 - 10.5 mg/dL Final     Total Protein   Date Value Ref Range Status   06/30/2021 6.9 6.0 - 8.4 g/dL Final     Albumin   Date Value Ref Range Status   06/30/2021 4.1 3.5 - 5.2 g/dL Final     Total Bilirubin   Date Value Ref Range Status   06/30/2021 1.1 (H) 0.1 - 1.0 mg/dL Final     Comment:     For infants and newborns, interpretation of results should be based  on gestational age, weight and in agreement with clinical  observations.    Premature Infant recommended reference ranges:  Up to 24 hours.............<8.0 mg/dL  Up to 48 hours............<12.0 mg/dL  3-5 days..................<15.0 mg/dL  6-29 days.................<15.0 mg/dL       Alkaline Phosphatase   Date Value Ref Range Status   06/30/2021 51 (L) 55 - 135 U/L Final     AST   Date Value Ref Range Status   06/30/2021 27 10 - 40 U/L Final     ALT   Date Value Ref Range Status   06/30/2021 31 10 - 44 U/L Final     Anion Gap   Date Value Ref Range Status   06/30/2021 12 8 - 16 mmol/L Final       Xrays    PROCEDURE:    CT CHEST WITH CONTRAST  dated  7/10/2020 11:03 AM     CLINICAL HISTORY:   Male 75 years of age.   Shortness of breath     TECHNIQUE: CT images were acquired from the  thoracic inlet to the  diaphragm, after the intravenous administration of contrast.     COMPARISON:  None     FINDINGS:     There is well-defined homogeneous fatty attenuating mass involving the  interatrial septum with sparing of the fossa ovalis. Maximal  transverse dimensions on the axial series are 2.3 cm x 4 cm (image 92,  series 3. There is calcification of the left main and anterior  descending coronary artery. On coronal images generated at the  workstation, measures 6 cm in length. This exerts mass effect on the  right superior pulmonary vein and on the left atrium. The superior  vena cava is flattened.     The heart is not enlarged. There is no pericardial effusion. There is  moderate calcified atherosclerotic plaque of the mid aortic arch  through the descending thoracic aorta. Aorta caliber is normal.  Pulmonary arteries are normal.     There is moderate severity panlobular emphysema of the right lung and  mild panlobular emphysema of the left lung.     Airways are patent. There is no suspicious pulmonary nodule. There is  no acute pulmonary infiltrate. There is no pleural effusion or pleural  thickening. There is no lymphadenopathy. Partially visualized thyroid  gland is normal. The esophagus is normal.     There is no destructive osseous lesion or acute fracture.     IMPRESSION:        1. 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 This flattens the  superior vena cava and exerts mass effect on the left atrium and  superior left pulmonary vein. Cardiology or cardiothoracic surgery  consultation is recommended.  2. There is calcification of the left main/descending coronary artery.  3. There is right worse than left panlobular emphysema. Consultation  with pulmonary medicine is advised this has not occurred previously.     Electronically Signed by Nanette Santiago on 7/10/2020 12:09 PM    Impression/Plan    Problem List Items Addressed This Visit          Pulmonary    COPD (chronic obstructive pulmonary  disease)     Continue present medications.  Will refill medications as needed.  Instructed patient to contact us with any issues concerning their medications (cost, reactions, etc.).  Have discussed with patient about inciting conditions which may exacerbate their disease.  We did discuss possible new therapies or de-escalation of therapy (if appropriate).  Asked patient if they were interested in pursuing pulmonary rehabilitation.  All questions answered  RTC 6 months  Patient instructed that they are to call if symptoms change or new issues develop prior to their next visit.              Other    Abnormal CT of the chest     Last CT scan 7/2020- stable findings                Raffaele Carrillo MD

## 2022-11-14 NOTE — DISCHARGE INSTRUCTIONS
After the procedure    Drink plenty of fluids.  You may have burning or light bleeding when you urinate--this is normal.  Medications may be prescribed to ease any discomfort or prevent infection. Take these as directed.  Call your doctor if you have heavy bleeding or blood clots, burning that lasts more than a day, a fever over 100°F  (38° C), or trouble urinating.    After Surgery:  Always be aware that any surgery can cause these symptoms:    Pain- Medication can be prescribed for pain to decrease your pain but may not completely take your pain away.  Over the Counter pain medicine my be enough and you can always use Ice and rest to help ease pain.    Bleeding- a little bleeding after a surgery is usually within normal.  If there is a lot of blood you need to notify your MD.  Emergency treatments of bleeding are cold application, elevation of the bleeding site and compression.    Infection- Infection after surgery is NOT a normal occurrence.  Signs of infection are fever, swelling, hot to touch the incision.  If this occurs notify your MD immediately.    Nausea- this can be common after a surgery especially if you have had anesthesia medicine or are taking pain medicine.  Staying on clear liquids, bland foods, gingerale, or over the counter anti nausea medicines can help.  If you vomit more than once, notify your MD.  Anti Nausea medicines can be prescribed.         Transrectal Ultrasound   A transrectal ultrasound is an imaging test. It uses sound waves to create pictures of a mans prostate gland to determine its size.Your prostate gland is in front of your rectum and if too large may become inflamed and impede the flow of urine. For this test, a special probe (transducer) is placed directly into your rectum.     Before leaving, you may need to wait for a short time while the images are reviewed. In most cases, you can go back to your normal routine after the test.     Use urinary dilator at least once  daily

## 2022-11-16 ENCOUNTER — HOSPITAL ENCOUNTER (OUTPATIENT)
Facility: AMBULARY SURGERY CENTER | Age: 77
Discharge: HOME | End: 2022-11-16
Attending: UROLOGY | Admitting: UROLOGY
Payer: MEDICARE

## 2022-11-16 DIAGNOSIS — R35.1 NOCTURIA: ICD-10-CM

## 2022-11-16 DIAGNOSIS — R39.9 LOWER URINARY TRACT SYMPTOMS (LUTS): Primary | ICD-10-CM

## 2022-11-16 PROCEDURE — 52000 CYSTOURETHROSCOPY: CPT | Performed by: UROLOGY

## 2022-11-16 PROCEDURE — 76872 PR US TRANSRECTAL: ICD-10-PCS | Mod: 26,,, | Performed by: UROLOGY

## 2022-11-16 PROCEDURE — 53600 DILATE URETHRA STRICTURE: CPT

## 2022-11-16 PROCEDURE — 52000 CYSTOURETHROSCOPY: CPT

## 2022-11-16 PROCEDURE — 76872 US TRANSRECTAL: CPT

## 2022-11-16 PROCEDURE — 76872 US TRANSRECTAL: CPT | Performed by: UROLOGY

## 2022-11-16 PROCEDURE — 87086 URINE CULTURE/COLONY COUNT: CPT | Performed by: UROLOGY

## 2022-11-16 PROCEDURE — 52281 PR CYSTOSCOPY,DIL URETHRAL STRICTURE: ICD-10-PCS | Mod: ,,, | Performed by: UROLOGY

## 2022-11-16 PROCEDURE — 76872 US TRANSRECTAL: CPT | Mod: 26,,, | Performed by: UROLOGY

## 2022-11-16 PROCEDURE — 52281 CYSTOSCOPY AND TREATMENT: CPT | Mod: ,,, | Performed by: UROLOGY

## 2022-11-16 RX ORDER — TAMSULOSIN HYDROCHLORIDE 0.4 MG/1
0.8 CAPSULE ORAL NIGHTLY
Qty: 180 CAPSULE | Refills: 3 | Status: SHIPPED | OUTPATIENT
Start: 2022-11-16 | End: 2024-02-09 | Stop reason: SDUPTHER

## 2022-11-16 RX ORDER — CLOTRIMAZOLE AND BETAMETHASONE DIPROPIONATE 10; .64 MG/G; MG/G
CREAM TOPICAL 2 TIMES DAILY
Qty: 45 G | Refills: 1 | Status: SHIPPED | OUTPATIENT
Start: 2022-11-16 | End: 2023-05-15

## 2022-11-16 RX ORDER — WATER 1 ML/ML
IRRIGANT IRRIGATION
Status: DISCONTINUED | OUTPATIENT
Start: 2022-11-16 | End: 2022-11-16 | Stop reason: HOSPADM

## 2022-11-16 RX ORDER — CEPHALEXIN 500 MG/1
500 CAPSULE ORAL EVERY 8 HOURS
Qty: 9 CAPSULE | Refills: 0 | Status: SHIPPED | OUTPATIENT
Start: 2022-11-16 | End: 2022-11-19

## 2022-11-16 RX ORDER — FINASTERIDE 5 MG/1
5 TABLET, FILM COATED ORAL NIGHTLY
Qty: 90 TABLET | Refills: 3 | Status: SHIPPED | OUTPATIENT
Start: 2022-11-16 | End: 2023-10-11 | Stop reason: SDUPTHER

## 2022-11-16 RX ORDER — LIDOCAINE HYDROCHLORIDE 20 MG/ML
JELLY TOPICAL
Status: DISCONTINUED | OUTPATIENT
Start: 2022-11-16 | End: 2022-11-16 | Stop reason: HOSPADM

## 2022-11-16 NOTE — OP NOTE
[facility]  Operative/Discharge Note    Date: 11/16/2022    Pre-Op Diagnosis: Lower urinary tract symptoms    Post-Op Diagnosis: Lower urinary tract symptoms, meatal stenosis    Procedure(s) Performed:   1.  Cystoscopy  2.  Meatal dilation  3.  Transrectal ultrasound     Specimen(s): Urine culture    Staff Surgeon: Hunter Guerrero MD    Assistant Surgeon: Hunter Guerrero Jr, MD    Anesthesia: Local anesthesia topical 2% lidocaine gel    Indications: Theo Chen Jr. is a 77 y.o. male with worsening lower urinary tract symptoms.    Findings: Meatal stenosis requiring dilation with a urethral dilator. Prostate 52 cc with trilobar hypertrophy, moderate trabeculations and scattered diverticula.     Estimated Blood Loss: min    Drains: None    TAMICA: Smooth, anodular    Procedure in Detail:  After risks, benefits and possible complications of cystoscopy and TRUS were explained, the patient elected to undergo the procedure and informed consent was obtained. All questions were answered in the heidi-operative area. The patient was transferred to the cystoscopy suite and placed in the lateral position.     TRUS probe was inserted into the rectum and the prostate measurement was 52cc.     The patient was placed in the lithotomy position and then prepped and draped in the usual sterile fashion. Lidocaine jelly was administered for local anesthesia. Time out was performed.    There was balanitis with significant meatal stenosis present. The meatal dilator was used to dilate this without difficulty. A flexible cystoscope was introduced into the bladder per urethra. This passed easily.  The entire urethra was visualized which showed no masses or strictures.  The prostate was 3 cm in length and appeared obstructing with trilobar hypertrophy. The right and left ureteral orifices were identified in the normal anatomic position and were seen effluxing clear urine.  Formal cystoscopy was performed which revealed no masses or lesions  suspicious for malignancy, moderate trabeculations, no bladder stones and scattered bladder diverticula.      The patient tolerated the procedure well and was transferred to recovery in stable condition.    Disposition: HOme    Discharge home today status post uncomplicated procedure as above  Diet - resume home diet  Follow up: We discussed that his worsening symptoms were likely secondary to his balanitis complicated by meatal stenosis. Dilated today. Prostate remains obstructing. Prescribed Lotrisone for his balanitis. Also given a meatal dilator to use daily. RTC in 6 weeks. If symptoms persist, will proceed with TURP and possible meatoplasty.  Instructions: Home with Keflex.  Continue Flomax 0.8 mg and Finasteride.   Meds:     Medication List        START taking these medications      cephALEXin 500 MG capsule  Commonly known as: KEFLEX  Take 1 capsule (500 mg total) by mouth every 8 (eight) hours. for 3 days     clotrimazole-betamethasone 1-0.05% cream  Commonly known as: LOTRISONE  Apply topically 2 (two) times daily.            CONTINUE taking these medications      aspirin 81 MG EC tablet  Commonly known as: ECOTRIN  Take 1 tablet (81 mg total) by mouth 2 (two) times daily.     doxazosin 2 MG tablet  Commonly known as: CARDURA  TAKE ONE TABLET BY MOUTH EVERY EVENING.     finasteride 5 mg tablet  Commonly known as: PROSCAR  Take 1 tablet (5 mg total) by mouth every evening.     fluconazole 150 MG Tab  Commonly known as: DIFLUCAN  Take 1 tablet (150 mg total) by mouth once a week.     FREESTYLE GIANNI 14 DAY READER Misc  Generic drug: flash glucose scanning reader  1 each by Misc.(Non-Drug; Combo Route) route once daily.     FREESTYLE GIANNI 14 DAY SENSOR Kit  Generic drug: flash glucose sensor  1 each by Misc.(Non-Drug; Combo Route) route every 14 (fourteen) days.     glipiZIDE 10 MG tablet  Commonly known as: GLUCOTROL  TAKE ONE TABLET BY MOUTH TWICE DAILY     hydrocortisone 1 % cream     JANUVIA 100 MG  Tab  Generic drug: SITagliptin phosphate     lisinopriL 10 MG tablet  Take 1 tablet (10 mg total) by mouth once daily.     metFORMIN 1000 MG tablet  Commonly known as: GLUCOPHAGE  Take 1 tablet (1,000 mg total) by mouth 2 (two) times daily.     metoprolol tartrate 25 MG tablet  Commonly known as: LOPRESSOR  Take 0.5 tablets (12.5 mg total) by mouth 2 (two) times daily.     montelukast 10 mg tablet  Commonly known as: SINGULAIR  Take 1 tablet (10 mg total) by mouth once daily.     pravastatin 20 MG tablet  Commonly known as: PRAVACHOL  TAKE 1 TABLET BY MOUTH ONCE DAILY AT BEDTIME     tamsulosin 0.4 mg Cap  Commonly known as: FLOMAX  Take 2 capsules (0.8 mg total) by mouth every evening. DO NOT CRUSH OR CHEW; SWALLOW WHOLE.     XARELTO 2.5 mg Tab  Generic drug: rivaroxaban     ZONTIVITY 2.08 mg Tab  Generic drug: vorapaxar            ASK your doctor about these medications      fluticasone-umeclidin-vilanter 100-62.5-25 mcg Dsdv  Commonly known as: TRELEGY ELLIPTA  Inhale 1 puff into the lungs once daily.     hydrOXYzine HCL 25 MG tablet  Commonly known as: ATARAX  Take 1 tablet (25 mg total) by mouth 3 (three) times daily as needed for Itching.     triamcinolone acetonide 0.1% 0.1 % cream  Commonly known as: KENALOG  Apply topically 2 (two) times daily.               Where to Get Your Medications        These medications were sent to [id number] [place] - [address]  [address] [address]      Phone: [phone]   cephALEXin 500 MG capsule  clotrimazole-betamethasone 1-0.05% cream  finasteride 5 mg tablet  tamsulosin 0.4 mg Cap         Hunter Guerrero Jr, MD

## 2022-11-17 VITALS
BODY MASS INDEX: 29.62 KG/M2 | RESPIRATION RATE: 18 BRPM | HEART RATE: 81 BPM | DIASTOLIC BLOOD PRESSURE: 82 MMHG | WEIGHT: 200 LBS | SYSTOLIC BLOOD PRESSURE: 160 MMHG | OXYGEN SATURATION: 96 % | HEIGHT: 69 IN | TEMPERATURE: 98 F

## 2022-11-18 LAB — BACTERIA UR CULT: NO GROWTH

## 2022-12-15 ENCOUNTER — TELEPHONE (OUTPATIENT)
Dept: FAMILY MEDICINE | Facility: CLINIC | Age: 77
End: 2022-12-15

## 2022-12-15 VITALS — SYSTOLIC BLOOD PRESSURE: 140 MMHG | DIASTOLIC BLOOD PRESSURE: 76 MMHG

## 2023-01-05 ENCOUNTER — OFFICE VISIT (OUTPATIENT)
Dept: FAMILY MEDICINE | Facility: CLINIC | Age: 78
End: 2023-01-05
Payer: MEDICARE

## 2023-01-05 VITALS
OXYGEN SATURATION: 97 % | WEIGHT: 204.81 LBS | DIASTOLIC BLOOD PRESSURE: 82 MMHG | HEIGHT: 69 IN | SYSTOLIC BLOOD PRESSURE: 126 MMHG | HEART RATE: 66 BPM | BODY MASS INDEX: 30.33 KG/M2

## 2023-01-05 DIAGNOSIS — E11.22 TYPE 2 DIABETES MELLITUS WITH STAGE 3B CHRONIC KIDNEY DISEASE, WITHOUT LONG-TERM CURRENT USE OF INSULIN: Primary | ICD-10-CM

## 2023-01-05 DIAGNOSIS — I10 ESSENTIAL HYPERTENSION: ICD-10-CM

## 2023-01-05 DIAGNOSIS — I70.0 ATHEROSCLEROSIS OF AORTA: ICD-10-CM

## 2023-01-05 DIAGNOSIS — E78.5 DYSLIPIDEMIA: ICD-10-CM

## 2023-01-05 DIAGNOSIS — J43.9 PULMONARY EMPHYSEMA, UNSPECIFIED EMPHYSEMA TYPE: ICD-10-CM

## 2023-01-05 DIAGNOSIS — N18.32 TYPE 2 DIABETES MELLITUS WITH STAGE 3B CHRONIC KIDNEY DISEASE, WITHOUT LONG-TERM CURRENT USE OF INSULIN: Primary | ICD-10-CM

## 2023-01-05 DIAGNOSIS — I27.29 OTHER SECONDARY PULMONARY HYPERTENSION: ICD-10-CM

## 2023-01-05 LAB — HBA1C MFR BLD: 7.2 %

## 2023-01-05 PROCEDURE — 99215 OFFICE O/P EST HI 40 MIN: CPT | Performed by: NURSE PRACTITIONER

## 2023-01-05 PROCEDURE — 99214 OFFICE O/P EST MOD 30 MIN: CPT | Mod: S$PBB,,, | Performed by: NURSE PRACTITIONER

## 2023-01-05 PROCEDURE — 99214 PR OFFICE/OUTPT VISIT, EST, LEVL IV, 30-39 MIN: ICD-10-PCS | Mod: S$PBB,,, | Performed by: NURSE PRACTITIONER

## 2023-01-05 PROCEDURE — 83036 HEMOGLOBIN GLYCOSYLATED A1C: CPT | Mod: PBBFAC | Performed by: NURSE PRACTITIONER

## 2023-01-05 RX ORDER — METFORMIN HYDROCHLORIDE 1000 MG/1
1000 TABLET ORAL 2 TIMES DAILY
Qty: 180 TABLET | Refills: 1 | Status: SHIPPED | OUTPATIENT
Start: 2023-01-05 | End: 2023-07-11 | Stop reason: SDUPTHER

## 2023-01-05 RX ORDER — MONTELUKAST SODIUM 10 MG/1
10 TABLET ORAL DAILY
Qty: 90 TABLET | Refills: 1 | Status: SHIPPED | OUTPATIENT
Start: 2023-01-05 | End: 2023-06-27

## 2023-01-05 RX ORDER — PRAVASTATIN SODIUM 20 MG/1
20 TABLET ORAL NIGHTLY
Qty: 90 TABLET | Refills: 1 | Status: SHIPPED | OUTPATIENT
Start: 2023-01-05 | End: 2023-07-11 | Stop reason: SDUPTHER

## 2023-01-05 RX ORDER — METOPROLOL TARTRATE 25 MG/1
12.5 TABLET, FILM COATED ORAL 2 TIMES DAILY
Qty: 90 TABLET | Refills: 1 | Status: SHIPPED | OUTPATIENT
Start: 2023-01-05 | End: 2023-07-11 | Stop reason: SDUPTHER

## 2023-01-05 RX ORDER — LISINOPRIL 10 MG/1
10 TABLET ORAL DAILY
Qty: 90 TABLET | Refills: 1 | Status: SHIPPED | OUTPATIENT
Start: 2023-01-05 | End: 2023-07-11 | Stop reason: SDUPTHER

## 2023-01-05 NOTE — PROGRESS NOTES
SUBJECTIVE:      Patient ID: Theo Chen Jr. is a 77 y.o. male.    Chief Complaint: Diabetes (6 month f/u DM)    Presents for DM recheck & lab orders - today's A1c 7.6 from 6.8 in July, admits to an increase in simple carbs over the holidays    Discussed outstanding health maintenance     Diabetes  He presents for his follow-up diabetic visit. He has type 2 diabetes mellitus. No MedicAlert identification noted. His disease course has been fluctuating. Pertinent negatives for hypoglycemia include no confusion, dizziness, headaches, nervousness/anxiousness or pallor. Pertinent negatives for diabetes include no chest pain, no fatigue, no polydipsia, no polyuria and no weakness. There are no hypoglycemic complications. Symptoms are stable. There are no diabetic complications. Risk factors for coronary artery disease include diabetes mellitus, dyslipidemia, family history, hypertension, male sex, sedentary lifestyle and obesity. Current diabetic treatment includes oral agent (dual therapy). He is compliant with treatment all of the time. His weight is fluctuating minimally. He is following a generally healthy diet. He has not had a previous visit with a dietitian. He never participates in exercise. An ACE inhibitor/angiotensin II receptor blocker is being taken. He does not see a podiatrist.Eye exam is current.   Hypertension  This is a chronic problem. The current episode started more than 1 month ago. The problem is controlled. Associated symptoms include shortness of breath (at times, improved on Trelegy). Pertinent negatives include no chest pain, headaches or neck pain. Risk factors for coronary artery disease include diabetes mellitus, dyslipidemia, family history, male gender, sedentary lifestyle and obesity. Past treatments include ACE inhibitors, alpha 1 blockers and beta blockers. The current treatment provides moderate improvement. Compliance problems include diet and exercise.  Hypertensive end-organ  damage includes heart failure.     Past Surgical History:   Procedure Laterality Date    ABDOMINAL AORTOGRAPHY N/A 2020    Procedure: Aortogram, Abdominal;  Surgeon: Carlitos Jaime MD;  Location: [facility] CATH/EP LAB;  Service: Cardiology;  Laterality: N/A;    APPENDECTOMY      CATHETERIZATION OF BOTH LEFT AND RIGHT HEART N/A 2020    Procedure: CATHETERIZATION, HEART, BOTH LEFT AND RIGHT;  Surgeon: Carlitos Jaime MD;  Location: [facility] CATH/EP LAB;  Service: Cardiology;  Laterality: N/A;    CYSTOSCOPY N/A 6/10/2020    Procedure: CYSTOSCOPY;  Surgeon: Hunter Guerrero Jr., MD;  Location: [facility];  Service: Urology;  Laterality: N/A;    CYSTOSCOPY N/A 2022    Procedure: CYSTOSCOPY;  Surgeon: Hunter Guerrero Jr., MD;  Location: [facility];  Service: Urology;  Laterality: N/A;    EYE SURGERY Left     TRANSRECTAL ULTRASOUND EXAMINATION N/A 6/10/2020    Procedure: ULTRASOUND, RECTAL APPROACH;  Surgeon: Hunter Guerrero Jr., MD;  Location: [facility];  Service: Urology;  Laterality: N/A;    TRANSRECTAL ULTRASOUND EXAMINATION N/A 2022    Procedure: ULTRASOUND, RECTAL APPROACH;  Surgeon: Hunter Guerrero Jr., MD;  Location: [facility];  Service: Urology;  Laterality: N/A;     Family History   Problem Relation Age of Onset    Hypertension Mother     Heart disease Father       Social History     Socioeconomic History    Marital status:    Occupational History    Occupation: retired    Tobacco Use    Smoking status: Former     Packs/day: 2.00     Years: 45.00     Pack years: 90.00     Types: Cigarettes     Quit date:      Years since quittin.0    Smokeless tobacco: Never   Substance and Sexual Activity    Alcohol use: No    Drug use: No     Current Outpatient Medications   Medication Sig Dispense Refill    aspirin (ECOTRIN) 81 MG EC tablet Take 1 tablet (81 mg total) by mouth 2 (two) times daily.      clotrimazole-betamethasone 1-0.05% (LOTRISONE) cream Apply topically 2 (two) times daily. 45 g 1    doxazosin  (CARDURA) 2 MG tablet TAKE ONE TABLET BY MOUTH EVERY EVENING. 90 tablet 1    finasteride (PROSCAR) 5 mg tablet Take 1 tablet (5 mg total) by mouth every evening. 90 tablet 3    flash glucose scanning reader (FREESTYLE GIANNI 14 DAY READER) Misc 1 each by Misc.(Non-Drug; Combo Route) route once daily. 1 each 0    flash glucose sensor (FREESTYLE GIANNI 14 DAY SENSOR) Kit 1 each by Misc.(Non-Drug; Combo Route) route every 14 (fourteen) days. 2 kit 2    fluconazole (DIFLUCAN) 150 MG Tab Take 1 tablet (150 mg total) by mouth once a week. 8 tablet 1    fluticasone-umeclidin-vilanter (TRELEGY ELLIPTA) 100-62.5-25 mcg DsDv Inhale 1 puff into the lungs once daily. 3 each 3    glipiZIDE (GLUCOTROL) 10 MG tablet TAKE ONE TABLET BY MOUTH TWICE DAILY 180 tablet 1    hydrocortisone 1 % cream Apply topically 2 (two) times daily.      hydrOXYzine HCL (ATARAX) 25 MG tablet Take 1 tablet (25 mg total) by mouth 3 (three) times daily as needed for Itching. 30 tablet 0    tamsulosin (FLOMAX) 0.4 mg Cap Take 2 capsules (0.8 mg total) by mouth every evening. DO NOT CRUSH OR CHEW; SWALLOW WHOLE. 180 capsule 3    triamcinolone acetonide 0.1% (KENALOG) 0.1 % cream Apply topically 2 (two) times daily. 80 g 1    XARELTO 2.5 mg Tab Take by mouth.      ZONTIVITY 2.08 mg Tab Take 1 tablet by mouth once daily.      lisinopriL 10 MG tablet Take 1 tablet (10 mg total) by mouth once daily. 90 tablet 1    metFORMIN (GLUCOPHAGE) 1000 MG tablet Take 1 tablet (1,000 mg total) by mouth 2 (two) times daily. 180 tablet 1    metoprolol tartrate (LOPRESSOR) 25 MG tablet Take 0.5 tablets (12.5 mg total) by mouth 2 (two) times daily. 90 tablet 1    montelukast (SINGULAIR) 10 mg tablet Take 1 tablet (10 mg total) by mouth once daily. 90 tablet 1    pravastatin (PRAVACHOL) 20 MG tablet Take 1 tablet (20 mg total) by mouth every evening. 90 tablet 1    SITagliptin phosphate (JANUVIA) 100 MG Tab Take 1 tablet (100 mg total) by mouth once daily. 90 tablet 1     No  current facility-administered medications for this visit.     Review of patient's allergies indicates:  No Known Allergies   Past Medical History:   Diagnosis Date    COPD (chronic obstructive pulmonary disease)     Diabetes mellitus, type 2     Gastroenteritis     Seasonal allergic rhinitis due to pollen     Shingles      Past Surgical History:   Procedure Laterality Date    ABDOMINAL AORTOGRAPHY N/A 9/9/2020    Procedure: Aortogram, Abdominal;  Surgeon: Carlitos Jaime MD;  Location: [facility] CATH/EP LAB;  Service: Cardiology;  Laterality: N/A;    APPENDECTOMY      CATHETERIZATION OF BOTH LEFT AND RIGHT HEART N/A 9/9/2020    Procedure: CATHETERIZATION, HEART, BOTH LEFT AND RIGHT;  Surgeon: Carlitos Jaime MD;  Location: [facility] CATH/EP LAB;  Service: Cardiology;  Laterality: N/A;    CYSTOSCOPY N/A 6/10/2020    Procedure: CYSTOSCOPY;  Surgeon: Hunter Guerrero Jr., MD;  Location: [facility];  Service: Urology;  Laterality: N/A;    CYSTOSCOPY N/A 11/16/2022    Procedure: CYSTOSCOPY;  Surgeon: Hunter Guerrero Jr., MD;  Location: [facility];  Service: Urology;  Laterality: N/A;    EYE SURGERY Left     TRANSRECTAL ULTRASOUND EXAMINATION N/A 6/10/2020    Procedure: ULTRASOUND, RECTAL APPROACH;  Surgeon: Hunter Guerrero Jr., MD;  Location: [facility];  Service: Urology;  Laterality: N/A;    TRANSRECTAL ULTRASOUND EXAMINATION N/A 11/16/2022    Procedure: ULTRASOUND, RECTAL APPROACH;  Surgeon: Hunter Guerrero Jr., MD;  Location: [facility];  Service: Urology;  Laterality: N/A;       Review of Systems   Constitutional:  Positive for appetite change (increased intake of sweets). Negative for activity change, fatigue and fever.        States he recently had labs via cardiology   HENT:  Negative for congestion, ear pain, hearing loss, postnasal drip, sinus pressure, sinus pain, sneezing and sore throat.         Oral thrush on occasion with Trelegy   Eyes:  Negative for photophobia and pain.        Blind in left eye   Respiratory:  Positive for  "shortness of breath (at times, improved on Trelegy). Negative for cough, chest tightness and wheezing.    Cardiovascular:  Negative for chest pain and leg swelling.   Gastrointestinal:  Negative for abdominal distention, abdominal pain, blood in stool, constipation, diarrhea, nausea and vomiting.   Endocrine: Negative for cold intolerance, heat intolerance, polydipsia and polyuria.   Genitourinary:  Negative for difficulty urinating, dysuria, flank pain, frequency, hematuria and urgency.        Nocturia   Musculoskeletal:  Negative for arthralgias, back pain, joint swelling, myalgias and neck pain.   Skin:  Negative for pallor and rash.   Allergic/Immunologic: Negative for environmental allergies and food allergies.   Neurological:  Negative for dizziness, weakness, light-headedness and headaches.   Hematological:  Does not bruise/bleed easily.   Psychiatric/Behavioral:  Negative for confusion, decreased concentration and sleep disturbance. The patient is not nervous/anxious.     OBJECTIVE:      Vitals:    01/05/23 0837   BP: 126/82   BP Location: Right arm   Patient Position: Sitting   BP Method: Large (Manual)   Pulse: 66   SpO2: 97%   Weight: 92.9 kg (204 lb 12.8 oz)   Height: 5' 9" (1.753 m)     Physical Exam  Vitals and nursing note reviewed.   Constitutional:       General: He is not in acute distress.     Appearance: Normal appearance. He is well-developed. He is obese.      Comments: 6# gain since July 2022 visit   HENT:      Head: Normocephalic and atraumatic.      Right Ear: Hearing normal.      Left Ear: Hearing normal.      Nose: Nose normal. No rhinorrhea.   Eyes:      General: Lids are normal.         Right eye: No discharge.         Left eye: No discharge.      Conjunctiva/sclera: Conjunctivae normal.      Right eye: Right conjunctiva is not injected.      Left eye: Left conjunctiva is not injected.      Pupils: Pupils are equal, round, and reactive to light. Pupils are equal.      Right eye: Pupil is " round and reactive.      Left eye: Pupil is round and reactive.      Comments: Exotropia left eye   Neck:      Thyroid: No thyromegaly.      Vascular: No JVD.      Trachea: Trachea normal. No tracheal deviation.   Cardiovascular:      Rate and Rhythm: Normal rate and regular rhythm.      Pulses:           Radial pulses are 2+ on the right side and 2+ on the left side.        Dorsalis pedis pulses are 2+ on the right side and 2+ on the left side.      Heart sounds: Normal heart sounds. No murmur heard.    No friction rub. No gallop.   Pulmonary:      Effort: Pulmonary effort is normal. No respiratory distress.      Breath sounds: Normal breath sounds. No stridor. No decreased breath sounds, wheezing, rhonchi or rales.   Abdominal:      General: Bowel sounds are normal. There is no distension.      Palpations: Abdomen is soft. Abdomen is not rigid.      Tenderness: There is no abdominal tenderness. There is no guarding.   Musculoskeletal:         General: Normal range of motion.      Cervical back: Normal range of motion and neck supple.   Lymphadenopathy:      Cervical: No cervical adenopathy.   Skin:     General: Skin is warm and dry.      Capillary Refill: Capillary refill takes less than 2 seconds.      Coloration: Skin is not pale.      Findings: No lesion or rash.   Neurological:      Mental Status: He is alert and oriented to person, place, and time.      GCS: GCS eye subscore is 4. GCS verbal subscore is 5. GCS motor subscore is 6.      Sensory: Sensation is intact.      Motor: Motor function is intact. No atrophy.      Coordination: Coordination normal.      Gait: Gait is intact. Gait normal.   Psychiatric:         Attention and Perception: Attention normal. He is attentive.         Mood and Affect: Mood and affect normal.         Speech: Speech normal.         Behavior: Behavior normal.         Thought Content: Thought content normal.         Cognition and Memory: Cognition normal.         Judgment: Judgment  normal.      Assessment:       1. Type 2 diabetes mellitus with stage 3b chronic kidney disease, without long-term current use of insulin    2. Essential hypertension    3. Pulmonary emphysema, unspecified emphysema type    4. Dyslipidemia    5. Other secondary pulmonary hypertension    6. Atherosclerosis of aorta        Plan:       Type 2 diabetes mellitus with stage 3b chronic kidney disease, without long-term current use of insulin  -     POCT HEMOGLOBIN A1C  -     lisinopriL 10 MG tablet; Take 1 tablet (10 mg total) by mouth once daily.  Dispense: 90 tablet; Refill: 1  -     metFORMIN (GLUCOPHAGE) 1000 MG tablet; Take 1 tablet (1,000 mg total) by mouth 2 (two) times daily.  Dispense: 180 tablet; Refill: 1  -     SITagliptin phosphate (JANUVIA) 100 MG Tab; Take 1 tablet (100 mg total) by mouth once daily.  Dispense: 90 tablet; Refill: 1    Essential hypertension  -     lisinopriL 10 MG tablet; Take 1 tablet (10 mg total) by mouth once daily.  Dispense: 90 tablet; Refill: 1  -     metoprolol tartrate (LOPRESSOR) 25 MG tablet; Take 0.5 tablets (12.5 mg total) by mouth 2 (two) times daily.  Dispense: 90 tablet; Refill: 1    Pulmonary emphysema, unspecified emphysema type  -     montelukast (SINGULAIR) 10 mg tablet; Take 1 tablet (10 mg total) by mouth once daily.  Dispense: 90 tablet; Refill: 1    Dyslipidemia  -     pravastatin (PRAVACHOL) 20 MG tablet; Take 1 tablet (20 mg total) by mouth every evening.  Dispense: 90 tablet; Refill: 1    Other secondary pulmonary hypertension        - follows with pulmonology    Atherosclerosis of aorta        - follows with cardiology            Follow up in about 6 months (around 7/5/2023) for DM.      1/5/2023 Carmen Chamorro, RONNIE, FNP-C

## 2023-01-11 ENCOUNTER — OFFICE VISIT (OUTPATIENT)
Dept: UROLOGY | Facility: CLINIC | Age: 78
End: 2023-01-11
Payer: MEDICARE

## 2023-01-11 VITALS
WEIGHT: 204 LBS | BODY MASS INDEX: 30.21 KG/M2 | HEART RATE: 46 BPM | HEIGHT: 69 IN | SYSTOLIC BLOOD PRESSURE: 110 MMHG | DIASTOLIC BLOOD PRESSURE: 70 MMHG

## 2023-01-11 DIAGNOSIS — N35.911 STRICTURE OF URETHRAL MEATUS IN MALE, UNSPECIFIED STRICTURE TYPE: ICD-10-CM

## 2023-01-11 DIAGNOSIS — R39.9 LOWER URINARY TRACT SYMPTOMS (LUTS): Primary | ICD-10-CM

## 2023-01-11 DIAGNOSIS — N48.1 BALANITIS: ICD-10-CM

## 2023-01-11 PROCEDURE — 99999 PR PBB SHADOW E&M-EST. PATIENT-LVL IV: CPT | Mod: PBBFAC,,, | Performed by: UROLOGY

## 2023-01-11 PROCEDURE — 99214 OFFICE O/P EST MOD 30 MIN: CPT | Mod: PBBFAC,PN | Performed by: UROLOGY

## 2023-01-11 PROCEDURE — 99214 OFFICE O/P EST MOD 30 MIN: CPT | Mod: S$PBB,,, | Performed by: UROLOGY

## 2023-01-11 PROCEDURE — 99214 PR OFFICE/OUTPT VISIT, EST, LEVL IV, 30-39 MIN: ICD-10-PCS | Mod: S$PBB,,, | Performed by: UROLOGY

## 2023-01-11 PROCEDURE — 99999 PR PBB SHADOW E&M-EST. PATIENT-LVL IV: ICD-10-PCS | Mod: PBBFAC,,, | Performed by: UROLOGY

## 2023-01-11 NOTE — PROGRESS NOTES
[facility] Established Patient/H&P:    Theo Chen Jr. is a 77 y.o. male who presents for follow up for lower urinary tract symptoms.     Patient complains of nocturia x 4, frequency and incomplete emptying for approximately 1 year refractory to Flomax 0.8 MG PO daily and Finasteride 5 mg.      He is extremely bothered by his symptoms and does not notice any relief with medications.      Of note, he has required a pacheco catheter in the past for urinary retention.         Interval History     11/2/22: He underwent cystoscopy and TRUS on 6/10/20 which revealed a 30 cc prostate with coaptation of bilateral lobes, large bladder volume. Plan was for possible Rezum, however patient was lost to follow up due to the COVID pandemic.      Presents now complaining of severe lower urinary tract symptoms - incomplete emptying, frequency, urgency, intermittency, weak stream and nocturia x 4. Drinks only water and tea. He remains on Flomax 0.8 mg and Finasteride 5 mg PO daily. Bothered by his symptoms. Urine dipstick only with glucose today.     1/11/23: He underwent cystoscopy, meatal dilation and transrectal ultrasound on 11/16/22 which revealed meatal stenosis requiring dilation with a urethral dilator. Prostate 52 cc with trilobar hypertrophy, moderate trabeculations and scattered diverticula. It was felt that his symptoms were likely secondary to his balanitis complicated by meatal stenosis. Prescribed Lotrisone and given a meatal dilator to use daily. States his lower urinary tract symptoms have improved significantly since his visit. No longer with weak stream or nocturia. PVR improved to 133 mL from 378 mL prior.      Patient denies any fever, chills, dysuria, urinary tract infection, recent  trauma or history of  malignancy.         PSA  0.7                   5/7/19     IPSS    QoL  30        5          11/2/22     PVR  133 mL  1/11/23  378 mL         11/2/22  360 mL            6/16/20    "  Urine culture  No growth        11/2/22    Past Medical History:   Diagnosis Date    COPD (chronic obstructive pulmonary disease)     Diabetes mellitus, type 2     Gastroenteritis     Seasonal allergic rhinitis due to pollen     Shingles        Past Surgical History:   Procedure Laterality Date    ABDOMINAL AORTOGRAPHY N/A 9/9/2020    Procedure: Aortogram, Abdominal;  Surgeon: Carlitos Jaime MD;  Location: [facility] CATH/EP LAB;  Service: Cardiology;  Laterality: N/A;    APPENDECTOMY      CATHETERIZATION OF BOTH LEFT AND RIGHT HEART N/A 9/9/2020    Procedure: CATHETERIZATION, HEART, BOTH LEFT AND RIGHT;  Surgeon: Carlitos Jaime MD;  Location: [facility] CATH/EP LAB;  Service: Cardiology;  Laterality: N/A;    CYSTOSCOPY N/A 6/10/2020    Procedure: CYSTOSCOPY;  Surgeon: Hunter Guerrero Jr., MD;  Location: [facility] OR;  Service: Urology;  Laterality: N/A;    CYSTOSCOPY N/A 11/16/2022    Procedure: CYSTOSCOPY;  Surgeon: Hunter Guerrero Jr., MD;  Location: [facility];  Service: Urology;  Laterality: N/A;    EYE SURGERY Left     TRANSRECTAL ULTRASOUND EXAMINATION N/A 6/10/2020    Procedure: ULTRASOUND, RECTAL APPROACH;  Surgeon: Hunter Guerrero Jr., MD;  Location: [facility] OR;  Service: Urology;  Laterality: N/A;    TRANSRECTAL ULTRASOUND EXAMINATION N/A 11/16/2022    Procedure: ULTRASOUND, RECTAL APPROACH;  Surgeon: Hunter Guerrero Jr., MD;  Location: [facility];  Service: Urology;  Laterality: N/A;       Review of patient's allergies indicates:  No Known Allergies    Medications Reviewed: see MAR    FOCUSED PHYSICAL EXAM:    Vitals:    01/11/23 1004   BP: 110/70   Pulse: (!) 46     Body mass index is 30.13 kg/m². Weight: 92.5 kg (204 lb) Height: 5' 9" (175.3 cm)       General: Alert, cooperative, no distress, appears stated age  Abdomen: Soft, non-tender, no CVA tenderness, non-distended      LABS:    Recent Results (from the past 336 hour(s))   POCT HEMOGLOBIN A1C    Collection Time: 01/05/23  8:55 AM   Result Value Ref Range    Hemoglobin " A1C, POC 7.2 %           Assessment/Diagnosis:    1. Lower urinary tract symptoms (LUTS)        2. Stricture of urethral meatus in male, unspecified stricture type        3. Balanitis            Plans:    - I spent 30 minutes of the day of this encounter preparing for, treating and managing this patient. Extensive discussion with patient regarding the etiology and management of his lower urinary tract symptoms. Explained that LUTS are multifactorial and can be secondary to an enlarged prostate, PO intake of bladder irritants, overactive bladder, constipation, malignancy, trauma, infection, stones or medications.   - LUTS and PVR have improved significantly since his cystoscopy with meatal dilation. Patient has been using a meatal dilator several times weekly to keep his meatus patent.   - Continue Flomax 0.8 mg and Finasteride 5 mg PO daily.   - Continue to use dilator at least once weekly.   - RTC in 1 year with symptom score and PVR.

## 2023-02-28 ENCOUNTER — HOSPITAL ENCOUNTER (OUTPATIENT)
Dept: RADIOLOGY | Facility: HOSPITAL | Age: 78
Discharge: HOME | End: 2023-02-28
Attending: INTERNAL MEDICINE
Payer: MEDICARE

## 2023-02-28 ENCOUNTER — OFFICE VISIT (OUTPATIENT)
Dept: FAMILY MEDICINE | Facility: CLINIC | Age: 78
End: 2023-02-28
Payer: MEDICARE

## 2023-02-28 VITALS
SYSTOLIC BLOOD PRESSURE: 110 MMHG | WEIGHT: 200 LBS | DIASTOLIC BLOOD PRESSURE: 68 MMHG | OXYGEN SATURATION: 95 % | RESPIRATION RATE: 16 BRPM | HEIGHT: 69 IN | HEART RATE: 76 BPM | TEMPERATURE: 98 F | BODY MASS INDEX: 29.62 KG/M2

## 2023-02-28 DIAGNOSIS — K59.01 SLOW TRANSIT CONSTIPATION: Primary | ICD-10-CM

## 2023-02-28 DIAGNOSIS — R14.3 FLATULENCE: ICD-10-CM

## 2023-02-28 PROCEDURE — 74019 RADEX ABDOMEN 2 VIEWS: CPT | Mod: TC,PO

## 2023-02-28 PROCEDURE — 99214 OFFICE O/P EST MOD 30 MIN: CPT | Mod: S$PBB,AQ,, | Performed by: INTERNAL MEDICINE

## 2023-02-28 PROCEDURE — 99215 OFFICE O/P EST HI 40 MIN: CPT | Performed by: INTERNAL MEDICINE

## 2023-02-28 PROCEDURE — 99214 PR OFFICE/OUTPT VISIT, EST, LEVL IV, 30-39 MIN: ICD-10-PCS | Mod: S$PBB,AQ,, | Performed by: INTERNAL MEDICINE

## 2023-02-28 RX ORDER — METOCLOPRAMIDE 10 MG/1
TABLET ORAL
Qty: 40 TABLET | Refills: 0 | Status: SHIPPED | OUTPATIENT
Start: 2023-02-28 | End: 2023-04-12

## 2023-02-28 NOTE — PROGRESS NOTES
SUBJECTIVE:    Patient ID: Theo Chen Jr. is a 77 y.o. male.    Chief Complaint: GI Problem, Constipation, and Bloated    HPI    Patient says he stays bloated-passes a lot of gas-BM every other day-passes rocks first then BM-no pain except with strain-no nausea-no food intolerances-no back injury-some lower abd pain before BM-mag citrate clears him out    Patient has Diabetes-sometimes food hangs up in mid esophagus and he has to drink water before returning to eating-eating does not increase bloating-    Still has GB-never has had a colonoscopy-will not do it-wont do cologuard either    Office Visit on 01/05/2023   Component Date Value Ref Range Status    Hemoglobin A1C, POC 01/05/2023 7.2  % Final   Admission on 11/16/2022, Discharged on 11/16/2022   Component Date Value Ref Range Status    Urine Culture, Routine 11/16/2022 No growth   Final   Office Visit on 11/02/2022   Component Date Value Ref Range Status    Color, POC UA 11/02/2022 Yellow  Yellow, Straw, Colorless Final    Clarity, POC UA 11/02/2022 Clear  Clear Final    Glucose, POC UA 11/02/2022 500 (A)  Negative Final    Bilirubin, POC UA 11/02/2022 Negative  Negative Final    Ketones, POC UA 11/02/2022 Negative  Negative Final    Spec Grav POC UA 11/02/2022 1.025  1.005 - 1.030 Final    Blood, POC UA 11/02/2022 Negative  Negative Final    pH, POC UA 11/02/2022 5.5  5.0 - 8.0 Final    Protein, POC UA 11/02/2022 Negative  Negative Final    Urobilinogen, POC UA 11/02/2022 0.2  <=1.0 Final    Nitrite, POC UA 11/02/2022 Negative  Negative Final    WBC, POC UA 11/02/2022 Negative  Negative Final    POC Residual Urine Volume 11/02/2022 378 (A)  0 - 100 mL Final   Office Visit on 07/06/2022   Component Date Value Ref Range Status    Hemoglobin A1C, POC 07/06/2022 6.8  % Final       Past Medical History:   Diagnosis Date    COPD (chronic obstructive pulmonary disease)     Diabetes mellitus, type 2     Gastroenteritis     Seasonal allergic rhinitis due to  pollen     Shingles      Past Surgical History:   Procedure Laterality Date    ABDOMINAL AORTOGRAPHY N/A 9/9/2020    Procedure: Aortogram, Abdominal;  Surgeon: Carlitos Jaime MD;  Location: [facility] CATH/EP LAB;  Service: Cardiology;  Laterality: N/A;    APPENDECTOMY      CATHETERIZATION OF BOTH LEFT AND RIGHT HEART N/A 9/9/2020    Procedure: CATHETERIZATION, HEART, BOTH LEFT AND RIGHT;  Surgeon: Carlitos Jaime MD;  Location: [facility] CATH/EP LAB;  Service: Cardiology;  Laterality: N/A;    CYSTOSCOPY N/A 6/10/2020    Procedure: CYSTOSCOPY;  Surgeon: Hunter Guerrero Jr., MD;  Location: [facility];  Service: Urology;  Laterality: N/A;    CYSTOSCOPY N/A 11/16/2022    Procedure: CYSTOSCOPY;  Surgeon: Hunter Guerrero Jr., MD;  Location: [facility];  Service: Urology;  Laterality: N/A;    EYE SURGERY Left     TRANSRECTAL ULTRASOUND EXAMINATION N/A 6/10/2020    Procedure: ULTRASOUND, RECTAL APPROACH;  Surgeon: Hunter Guerrero Jr., MD;  Location: [facility];  Service: Urology;  Laterality: N/A;    TRANSRECTAL ULTRASOUND EXAMINATION N/A 11/16/2022    Procedure: ULTRASOUND, RECTAL APPROACH;  Surgeon: Hunter Guerrero Jr., MD;  Location: [facility];  Service: Urology;  Laterality: N/A;     Family History   Problem Relation Age of Onset    Hypertension Mother     Heart disease Father        Marital Status:   Alcohol History:  reports no history of alcohol use.  Tobacco History:  reports that he quit smoking about 18 years ago. He has a 90.00 pack-year smoking history. He has never used smokeless tobacco.  Drug History:  reports no history of drug use.    Review of patient's allergies indicates:  No Known Allergies    Current Outpatient Medications:     aspirin (ECOTRIN) 81 MG EC tablet, Take 1 tablet (81 mg total) by mouth 2 (two) times daily., Disp: , Rfl:     doxazosin (CARDURA) 2 MG tablet, TAKE ONE TABLET BY MOUTH EVERY EVENING., Disp: 90 tablet, Rfl: 1    finasteride (PROSCAR) 5 mg tablet, Take 1 tablet (5 mg total) by mouth every  evening., Disp: 90 tablet, Rfl: 3    flash glucose scanning reader (FREESTYLE GIANNI 14 DAY READER) Misc, 1 each by Misc.(Non-Drug; Combo Route) route once daily., Disp: 1 each, Rfl: 0    flash glucose sensor (FREESTYLE GIANNI 14 DAY SENSOR) Kit, 1 each by Misc.(Non-Drug; Combo Route) route every 14 (fourteen) days., Disp: 2 kit, Rfl: 2    glipiZIDE (GLUCOTROL) 10 MG tablet, TAKE ONE TABLET BY MOUTH TWICE DAILY, Disp: 180 tablet, Rfl: 1    hydrocortisone 1 % cream, Apply topically 2 (two) times daily., Disp: , Rfl:     lisinopriL 10 MG tablet, Take 1 tablet (10 mg total) by mouth once daily., Disp: 90 tablet, Rfl: 1    metFORMIN (GLUCOPHAGE) 1000 MG tablet, Take 1 tablet (1,000 mg total) by mouth 2 (two) times daily., Disp: 180 tablet, Rfl: 1    metoprolol tartrate (LOPRESSOR) 25 MG tablet, Take 0.5 tablets (12.5 mg total) by mouth 2 (two) times daily., Disp: 90 tablet, Rfl: 1    montelukast (SINGULAIR) 10 mg tablet, Take 1 tablet (10 mg total) by mouth once daily., Disp: 90 tablet, Rfl: 1    pravastatin (PRAVACHOL) 20 MG tablet, Take 1 tablet (20 mg total) by mouth every evening., Disp: 90 tablet, Rfl: 1    SITagliptin phosphate (JANUVIA) 100 MG Tab, Take 1 tablet (100 mg total) by mouth once daily., Disp: 90 tablet, Rfl: 1    tamsulosin (FLOMAX) 0.4 mg Cap, Take 2 capsules (0.8 mg total) by mouth every evening. DO NOT CRUSH OR CHEW; SWALLOW WHOLE., Disp: 180 capsule, Rfl: 3    clotrimazole-betamethasone 1-0.05% (LOTRISONE) cream, Apply topically 2 (two) times daily. (Patient not taking: Reported on 2/28/2023), Disp: 45 g, Rfl: 1    metoclopramide HCl (REGLAN) 10 MG tablet, One by mouth 30 min before eating, Disp: 40 tablet, Rfl: 0    triamcinolone acetonide 0.1% (KENALOG) 0.1 % cream, Apply topically 2 (two) times daily. (Patient not taking: Reported on 2/28/2023), Disp: 80 g, Rfl: 1    Review of Systems   Constitutional:  Negative for activity change, appetite change, chills, diaphoresis, fatigue, fever and  unexpected weight change.   HENT:  Negative for congestion, ear pain, hearing loss, nosebleeds, postnasal drip, sinus pressure, sinus pain, sneezing, sore throat and trouble swallowing.    Eyes:  Negative for photophobia, pain and visual disturbance.   Respiratory:  Positive for shortness of breath. Negative for apnea, cough, choking, chest tightness and wheezing.    Cardiovascular:  Negative for chest pain, palpitations and leg swelling.   Gastrointestinal:  Negative for abdominal distention, abdominal pain, blood in stool, constipation, diarrhea, nausea and vomiting.        HPI   Endocrine: Negative for cold intolerance, heat intolerance, polydipsia and polyuria.   Genitourinary:  Negative for difficulty urinating, dysuria, flank pain, frequency, hematuria and urgency.        Nocturia--Has to self cath   Musculoskeletal:  Negative for arthralgias, back pain, joint swelling, myalgias, neck pain and neck stiffness.   Skin:  Negative for pallor and rash.   Allergic/Immunologic: Negative for environmental allergies and food allergies.   Neurological:  Negative for dizziness, tremors, seizures, syncope, speech difficulty, weakness, light-headedness, numbness and headaches.   Hematological:  Does not bruise/bleed easily.   Psychiatric/Behavioral:  Negative for agitation, confusion, decreased concentration, sleep disturbance and suicidal ideas. The patient is not nervous/anxious.         Objective:      Vitals    Vitals - 1 value per visit 1/11/2023 1/11/2023 2/28/2023 2/28/2023 2/28/2023   SYSTOLIC - 110 - 98 110   DIASTOLIC - 70 - 58 68   Pulse - 46 - 76 -   Temp - - - 97.7 -   Resp - - - 16 -   SPO2 - - - 95 -   Weight (lb) - 204 - 200 -   Weight (kg) - 92.534 - 90.719 -   Height - 69 - 69 -   BMI (Calculated) - 30.1 - 29.5 -   VISIT REPORT - - - - -   Pain Score  0 - 0 - -       Physical Exam  Constitutional:       General: He is not in acute distress.     Appearance: He is not ill-appearing.   HENT:      Head:  Normocephalic and atraumatic.   Eyes:      Extraocular Movements: Extraocular movements intact.      Conjunctiva/sclera: Conjunctivae normal.      Pupils: Pupils are equal, round, and reactive to light.   Cardiovascular:      Rate and Rhythm: Normal rate and regular rhythm.      Pulses: Normal pulses.      Heart sounds: Normal heart sounds.   Pulmonary:      Effort: Pulmonary effort is normal.      Breath sounds: Normal breath sounds.   Abdominal:      General: Bowel sounds are normal. There is distension.      Palpations: Abdomen is soft.      Comments: Mild distention-Bowel sounds quiet-no abdominal tenderness-no organomegaly   Musculoskeletal:      Cervical back: Normal range of motion and neck supple.      Right lower leg: No edema.      Left lower leg: No edema.   Skin:     General: Skin is warm and dry.   Neurological:      Mental Status: He is alert.   Psychiatric:         Mood and Affect: Mood normal.         Thought Content: Thought content normal.         Assessment:       1. Slow transit constipation    2. Flatulence         Plan:       Slow transit constipation  -     TSH; Future; Expected date: 03/01/2023    Flatulence  -     X-Ray Abdomen Flat And Erect; Future; Expected date: 02/28/2023    Other orders  -     metoclopramide HCl (REGLAN) 10 MG tablet; One by mouth 30 min before eating  Dispense: 40 tablet; Refill: 0      Follow up in about 2 weeks (around 3/14/2023) for GI.      Will need to self cath 4 times a day with no 16 [place] I and O catheters no 120 with 3 refills  2/28/2023 Chata Christianson M.D.

## 2023-03-01 ENCOUNTER — PATIENT MESSAGE (OUTPATIENT)
Dept: FAMILY MEDICINE | Facility: CLINIC | Age: 78
End: 2023-03-01

## 2023-03-21 ENCOUNTER — OFFICE VISIT (OUTPATIENT)
Dept: FAMILY MEDICINE | Facility: CLINIC | Age: 78
End: 2023-03-21
Payer: MEDICARE

## 2023-03-21 VITALS
HEART RATE: 78 BPM | OXYGEN SATURATION: 96 % | RESPIRATION RATE: 14 BRPM | TEMPERATURE: 98 F | SYSTOLIC BLOOD PRESSURE: 128 MMHG | HEIGHT: 69 IN | WEIGHT: 204 LBS | BODY MASS INDEX: 30.21 KG/M2 | DIASTOLIC BLOOD PRESSURE: 76 MMHG

## 2023-03-21 DIAGNOSIS — K59.01 SLOW TRANSIT CONSTIPATION: Primary | ICD-10-CM

## 2023-03-21 DIAGNOSIS — L30.9 DERMATITIS: ICD-10-CM

## 2023-03-21 DIAGNOSIS — N35.814 OTHER STRICTURE OF ANTERIOR URETHRA IN MALE: ICD-10-CM

## 2023-03-21 DIAGNOSIS — R06.89 ABNORMAL BREATH SOUNDS: ICD-10-CM

## 2023-03-21 DIAGNOSIS — R33.9 INCOMPLETE BLADDER EMPTYING: ICD-10-CM

## 2023-03-21 PROCEDURE — 99214 PR OFFICE/OUTPT VISIT, EST, LEVL IV, 30-39 MIN: ICD-10-PCS | Mod: S$PBB,AQ,, | Performed by: INTERNAL MEDICINE

## 2023-03-21 PROCEDURE — 99214 OFFICE O/P EST MOD 30 MIN: CPT | Mod: S$PBB,AQ,, | Performed by: INTERNAL MEDICINE

## 2023-03-21 PROCEDURE — 99215 OFFICE O/P EST HI 40 MIN: CPT | Performed by: INTERNAL MEDICINE

## 2023-03-21 RX ORDER — TRIAMCINOLONE ACETONIDE 1 MG/G
CREAM TOPICAL 2 TIMES DAILY
Qty: 80 G | Refills: 1 | Status: SHIPPED | OUTPATIENT
Start: 2023-03-21 | End: 2023-07-11 | Stop reason: SDUPTHER

## 2023-03-21 NOTE — PROGRESS NOTES
SUBJECTIVE:    Patient ID: Theo Chen Jr. is a 77 y.o. male.    Chief Complaint: GI Problem and Follow-up (2 weeks)    GI Problem  Primary symptoms do not include fever, fatigue, abdominal pain, nausea, vomiting, diarrhea, dysuria, myalgias, arthralgias or rash.   The illness does not include chills, constipation or back pain.   Follow-up  Pertinent negatives include no abdominal pain, arthralgias, chest pain, chills, congestion, coughing, diaphoresis, fatigue, fever, headaches, joint swelling, myalgias, nausea, neck pain, numbness, rash, sore throat, vomiting or weakness.     Patient comes in for recheck constipation which is 50% better but he feels his bladder is responsible for pressing against the bowel-says he is retaining urine and sounds like he needs some self cath kits-bladder never gets empty-has to strain hard to move BM- calls for once yearly visits     Last  visit:    - LUTS and PVR have improved significantly since his cystoscopy with meatal dilation. Patient has been using a meatal dilator several times weekly to keep his meatus patent.   - Continue Flomax 0.8 mg and Finasteride 5 mg PO daily.   - Continue to use dilator at least once weekly.   - RTC in 1 year with symptom score and PVR.     Lab Visit on 02/28/2023   Component Date Value Ref Range Status    TSH 02/28/2023 1.390  0.340 - 5.600 uIU/mL Final   Office Visit on 01/05/2023   Component Date Value Ref Range Status    Hemoglobin A1C, POC 01/05/2023 7.2  % Final   Admission on 11/16/2022, Discharged on 11/16/2022   Component Date Value Ref Range Status    Urine Culture, Routine 11/16/2022 No growth   Final   Office Visit on 11/02/2022   Component Date Value Ref Range Status    Color, POC UA 11/02/2022 Yellow  Yellow, Straw, Colorless Final    Clarity, POC UA 11/02/2022 Clear  Clear Final    Glucose, POC UA 11/02/2022 500 (A)  Negative Final    Bilirubin, POC UA 11/02/2022 Negative  Negative Final    Ketones, POC UA 11/02/2022  Negative  Negative Final    Spec Grav POC UA 11/02/2022 1.025  1.005 - 1.030 Final    Blood, POC UA 11/02/2022 Negative  Negative Final    pH, POC UA 11/02/2022 5.5  5.0 - 8.0 Final    Protein, POC UA 11/02/2022 Negative  Negative Final    Urobilinogen, POC UA 11/02/2022 0.2  <=1.0 Final    Nitrite, POC UA 11/02/2022 Negative  Negative Final    WBC, POC UA 11/02/2022 Negative  Negative Final    POC Residual Urine Volume 11/02/2022 378 (A)  0 - 100 mL Final   Office Visit on 07/06/2022   Component Date Value Ref Range Status    Hemoglobin A1C, POC 07/06/2022 6.8  % Final       Past Medical History:   Diagnosis Date    COPD (chronic obstructive pulmonary disease)     Diabetes mellitus, type 2     Gastroenteritis     Seasonal allergic rhinitis due to pollen     Shingles      Past Surgical History:   Procedure Laterality Date    ABDOMINAL AORTOGRAPHY N/A 9/9/2020    Procedure: Aortogram, Abdominal;  Surgeon: Carlitos Jaime MD;  Location: [facility] CATH/EP LAB;  Service: Cardiology;  Laterality: N/A;    APPENDECTOMY      CATHETERIZATION OF BOTH LEFT AND RIGHT HEART N/A 9/9/2020    Procedure: CATHETERIZATION, HEART, BOTH LEFT AND RIGHT;  Surgeon: Carlitos Jaime MD;  Location: [facility] CATH/EP LAB;  Service: Cardiology;  Laterality: N/A;    CYSTOSCOPY N/A 6/10/2020    Procedure: CYSTOSCOPY;  Surgeon: Hunter Guerrero Jr., MD;  Location: [facility] OR;  Service: Urology;  Laterality: N/A;    CYSTOSCOPY N/A 11/16/2022    Procedure: CYSTOSCOPY;  Surgeon: Hunter Guerrero Jr., MD;  Location: [facility] OR;  Service: Urology;  Laterality: N/A;    EYE SURGERY Left     TRANSRECTAL ULTRASOUND EXAMINATION N/A 6/10/2020    Procedure: ULTRASOUND, RECTAL APPROACH;  Surgeon: Hunter Guerrero Jr., MD;  Location: [facility] OR;  Service: Urology;  Laterality: N/A;    TRANSRECTAL ULTRASOUND EXAMINATION N/A 11/16/2022    Procedure: ULTRASOUND, RECTAL APPROACH;  Surgeon: Hunter Guerrero Jr., MD;  Location: [facility] OR;  Service: Urology;  Laterality: N/A;     Family History    Problem Relation Age of Onset    Hypertension Mother     Heart disease Father        Marital Status:   Alcohol History:  reports no history of alcohol use.  Tobacco History:  reports that he quit smoking about 18 years ago. He has a 90.00 pack-year smoking history. He has never used smokeless tobacco.  Drug History:  reports no history of drug use.    Review of patient's allergies indicates:  No Known Allergies    Current Outpatient Medications:     aspirin (ECOTRIN) 81 MG EC tablet, Take 1 tablet (81 mg total) by mouth 2 (two) times daily., Disp: , Rfl:     clotrimazole-betamethasone 1-0.05% (LOTRISONE) cream, Apply topically 2 (two) times daily., Disp: 45 g, Rfl: 1    doxazosin (CARDURA) 2 MG tablet, TAKE ONE TABLET BY MOUTH EVERY EVENING., Disp: 90 tablet, Rfl: 1    finasteride (PROSCAR) 5 mg tablet, Take 1 tablet (5 mg total) by mouth every evening., Disp: 90 tablet, Rfl: 3    flash glucose scanning reader (FREESTYLE GIANNI 14 DAY READER) Misc, 1 each by Misc.(Non-Drug; Combo Route) route once daily., Disp: 1 each, Rfl: 0    flash glucose sensor (FREESTYLE GIANNI 14 DAY SENSOR) Kit, 1 each by Misc.(Non-Drug; Combo Route) route every 14 (fourteen) days., Disp: 2 kit, Rfl: 2    glipiZIDE (GLUCOTROL) 10 MG tablet, TAKE ONE TABLET BY MOUTH TWICE DAILY, Disp: 180 tablet, Rfl: 1    hydrocortisone 1 % cream, Apply topically 2 (two) times daily., Disp: , Rfl:     lisinopriL 10 MG tablet, Take 1 tablet (10 mg total) by mouth once daily., Disp: 90 tablet, Rfl: 1    metFORMIN (GLUCOPHAGE) 1000 MG tablet, Take 1 tablet (1,000 mg total) by mouth 2 (two) times daily., Disp: 180 tablet, Rfl: 1    metoclopramide HCl (REGLAN) 10 MG tablet, One by mouth 30 min before eating, Disp: 40 tablet, Rfl: 0    metoprolol tartrate (LOPRESSOR) 25 MG tablet, Take 0.5 tablets (12.5 mg total) by mouth 2 (two) times daily., Disp: 90 tablet, Rfl: 1    montelukast (SINGULAIR) 10 mg tablet, Take 1 tablet (10 mg total) by mouth once daily.,  Disp: 90 tablet, Rfl: 1    pravastatin (PRAVACHOL) 20 MG tablet, Take 1 tablet (20 mg total) by mouth every evening., Disp: 90 tablet, Rfl: 1    SITagliptin phosphate (JANUVIA) 100 MG Tab, Take 1 tablet (100 mg total) by mouth once daily., Disp: 90 tablet, Rfl: 1    tamsulosin (FLOMAX) 0.4 mg Cap, Take 2 capsules (0.8 mg total) by mouth every evening. DO NOT CRUSH OR CHEW; SWALLOW WHOLE., Disp: 180 capsule, Rfl: 3    triamcinolone acetonide 0.1% (KENALOG) 0.1 % cream, Apply topically 2 (two) times daily., Disp: 80 g, Rfl: 1    Review of Systems   Constitutional:  Negative for appetite change, chills, diaphoresis, fatigue, fever and unexpected weight change.   HENT:  Negative for congestion, ear pain, hearing loss, nosebleeds, postnasal drip, sinus pressure, sinus pain, sneezing, sore throat and trouble swallowing.    Eyes:  Negative for photophobia, pain and visual disturbance.   Respiratory:  Positive for shortness of breath. Negative for apnea, cough, choking, chest tightness and wheezing.         No cigs for 15 years   Cardiovascular:  Negative for chest pain, palpitations and leg swelling.   Gastrointestinal:  Negative for abdominal distention, abdominal pain, blood in stool, constipation, diarrhea, nausea and vomiting.   Endocrine: Negative for cold intolerance, heat intolerance, polydipsia and polyuria.   Genitourinary:  Negative for difficulty urinating, dysuria, flank pain, frequency, hematuria and urgency.   Musculoskeletal:  Negative for arthralgias, back pain, joint swelling, myalgias, neck pain and neck stiffness.   Skin:  Negative for pallor and rash.   Allergic/Immunologic: Negative for environmental allergies and food allergies.   Neurological:  Negative for dizziness, tremors, seizures, syncope, speech difficulty, weakness, light-headedness, numbness and headaches.   Hematological:  Does not bruise/bleed easily.   Psychiatric/Behavioral:  Positive for sleep disturbance (nocturia). Negative for  agitation, confusion, decreased concentration and suicidal ideas. The patient is not nervous/anxious.         Objective:      Vitals:    03/21/23 0824   BP: 128/76   Pulse: 78   Resp: 14   Temp: 97.5 °F (36.4 °C)       Physical Exam  Vitals and nursing note reviewed.   Constitutional:       General: He is not in acute distress.     Appearance: Normal appearance. He is not ill-appearing.   HENT:      Head: Normocephalic and atraumatic.   Eyes:      Extraocular Movements: Extraocular movements intact.      Pupils: Pupils are equal, round, and reactive to light.   Neck:      Vascular: No carotid bruit.      Comments: exotropia  Cardiovascular:      Rate and Rhythm: Normal rate and regular rhythm.      Pulses: Normal pulses.      Heart sounds: Normal heart sounds.   Pulmonary:      Effort: Pulmonary effort is normal.      Breath sounds: Normal breath sounds.   Abdominal:      General: There is distension.   Musculoskeletal:      Right lower leg: No edema.      Left lower leg: No edema.   Lymphadenopathy:      Cervical: No cervical adenopathy.   Skin:     Findings: No lesion or rash.   Neurological:      General: No focal deficit present.      Mental Status: He is alert and oriented to person, place, and time.   Psychiatric:         Mood and Affect: Mood normal.         Behavior: Behavior normal.         Assessment:       1. Slow transit constipation    2. Dermatitis    3. 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 Incomplete bladder emptying    5. Abnormal breath sounds           Plan:       Slow transit constipation  -     Cancel: X-Ray Chest PA And Lateral; Future; Expected date: 03/21/2023    Dermatitis  -     triamcinolone acetonide 0.1% (KENALOG) 0.1 % cream; Apply topically 2 (two) times daily.  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        3/21/2023 Chata Christianson M.D.

## 2023-03-22 ENCOUNTER — PATIENT MESSAGE (OUTPATIENT)
Dept: FAMILY MEDICINE | Facility: CLINIC | Age: 78
End: 2023-03-22

## 2023-04-04 ENCOUNTER — HOSPITAL ENCOUNTER (OUTPATIENT)
Dept: RADIOLOGY | Facility: HOSPITAL | Age: 78
Discharge: HOME | End: 2023-04-04
Attending: INTERNAL MEDICINE
Payer: MEDICARE

## 2023-04-04 DIAGNOSIS — R06.89 ABNORMAL BREATH SOUNDS: ICD-10-CM

## 2023-04-04 PROCEDURE — 71046 X-RAY EXAM CHEST 2 VIEWS: CPT | Mod: TC,PO

## 2023-04-12 RX ORDER — METOCLOPRAMIDE 10 MG/1
TABLET ORAL
Qty: 60 TABLET | Refills: 1 | Status: SHIPPED | OUTPATIENT
Start: 2023-04-12 | End: 2023-05-16

## 2023-05-16 RX ORDER — METOCLOPRAMIDE 10 MG/1
TABLET ORAL
Qty: 60 TABLET | Refills: 1 | Status: SHIPPED | OUTPATIENT
Start: 2023-05-16 | End: 2023-08-22

## 2023-06-13 DIAGNOSIS — N18.32 TYPE 2 DIABETES MELLITUS WITH STAGE 3B CHRONIC KIDNEY DISEASE, WITHOUT LONG-TERM CURRENT USE OF INSULIN: ICD-10-CM

## 2023-06-13 DIAGNOSIS — E11.22 TYPE 2 DIABETES MELLITUS WITH STAGE 3B CHRONIC KIDNEY DISEASE, WITHOUT LONG-TERM CURRENT USE OF INSULIN: ICD-10-CM

## 2023-06-13 RX ORDER — GLIPIZIDE 10 MG/1
TABLET ORAL
Qty: 180 TABLET | Refills: 0 | Status: SHIPPED | OUTPATIENT
Start: 2023-06-13 | End: 2023-10-09 | Stop reason: SDUPTHER

## 2023-06-13 NOTE — TELEPHONE ENCOUNTER
Patient last seen: 01/05/23 - by you                               03/21/23 - by Dr. Christianson  Scheduled to be seen: 7/11/23  Medication last filled: 12/8/22    Medication pended

## 2023-06-19 ENCOUNTER — PATIENT MESSAGE (OUTPATIENT)
Dept: FAMILY MEDICINE | Facility: CLINIC | Age: 78
End: 2023-06-19

## 2023-06-19 DIAGNOSIS — B37.2 YEAST DERMATITIS: Primary | ICD-10-CM

## 2023-06-19 DIAGNOSIS — E78.5 DYSLIPIDEMIA: ICD-10-CM

## 2023-06-19 DIAGNOSIS — E11.22 TYPE 2 DIABETES MELLITUS WITH STAGE 3B CHRONIC KIDNEY DISEASE, WITHOUT LONG-TERM CURRENT USE OF INSULIN: ICD-10-CM

## 2023-06-19 DIAGNOSIS — N18.32 TYPE 2 DIABETES MELLITUS WITH STAGE 3B CHRONIC KIDNEY DISEASE, WITHOUT LONG-TERM CURRENT USE OF INSULIN: ICD-10-CM

## 2023-06-19 DIAGNOSIS — I10 ESSENTIAL HYPERTENSION: ICD-10-CM

## 2023-06-19 RX ORDER — FLUCONAZOLE 150 MG/1
150 TABLET ORAL ONCE
COMMUNITY
End: 2023-06-19 | Stop reason: SDUPTHER

## 2023-06-20 RX ORDER — FLUCONAZOLE 150 MG/1
150 TABLET ORAL WEEKLY
Qty: 4 TABLET | Refills: 0 | Status: SHIPPED | OUTPATIENT
Start: 2023-06-20 | End: 2023-07-11 | Stop reason: SDUPTHER

## 2023-06-26 DIAGNOSIS — J43.9 PULMONARY EMPHYSEMA, UNSPECIFIED EMPHYSEMA TYPE: ICD-10-CM

## 2023-06-27 RX ORDER — MONTELUKAST SODIUM 10 MG/1
TABLET ORAL
Qty: 90 TABLET | Refills: 0 | Status: SHIPPED | OUTPATIENT
Start: 2023-06-27 | End: 2023-10-09 | Stop reason: SDUPTHER

## 2023-07-08 LAB
ALBUMIN SERPL-MCNC: 4.5 G/DL (ref 3.7–4.7)
ALBUMIN/CREAT UR: 35 MG/G CREAT (ref 0–29)
ALBUMIN/GLOB SERPL: 2.3 {RATIO} (ref 1.2–2.2)
ALP SERPL-CCNC: 91 IU/L (ref 44–121)
ALT SERPL-CCNC: 19 IU/L (ref 0–44)
AST SERPL-CCNC: 18 IU/L (ref 0–40)
BASOPHILS # BLD AUTO: 0.1 X10E3/UL (ref 0–0.2)
BASOPHILS NFR BLD AUTO: 1 %
BILIRUB SERPL-MCNC: 0.3 MG/DL (ref 0–1.2)
BUN SERPL-MCNC: 17 MG/DL (ref 8–27)
BUN/CREAT SERPL: 12 (ref 10–24)
CALCIUM SERPL-MCNC: 9.8 MG/DL (ref 8.6–10.2)
CHLORIDE SERPL-SCNC: 99 MMOL/L (ref 96–106)
CHOLEST SERPL-MCNC: 148 MG/DL (ref 100–199)
CO2 SERPL-SCNC: 24 MMOL/L (ref 20–29)
CREAT SERPL-MCNC: 1.42 MG/DL (ref 0.76–1.27)
CREAT UR-MCNC: 51.6 MG/DL
EOSINOPHIL # BLD AUTO: 0.5 X10E3/UL (ref 0–0.4)
EOSINOPHIL NFR BLD AUTO: 6 %
ERYTHROCYTE [DISTWIDTH] IN BLOOD BY AUTOMATED COUNT: 13.3 % (ref 11.6–15.4)
EST. GFR  (NO RACE VARIABLE): 50 ML/MIN/1.73
GLOBULIN SER CALC-MCNC: 2 G/DL (ref 1.5–4.5)
GLUCOSE SERPL-MCNC: 198 MG/DL (ref 70–99)
HBA1C MFR BLD: 7.5 % (ref 4.8–5.6)
HCT VFR BLD AUTO: 45.3 % (ref 37.5–51)
HDLC SERPL-MCNC: 46 MG/DL
HGB BLD-MCNC: 14.9 G/DL (ref 13–17.7)
IMM GRANULOCYTES # BLD AUTO: 0 X10E3/UL (ref 0–0.1)
IMM GRANULOCYTES NFR BLD AUTO: 0 %
LDLC SERPL CALC-MCNC: 81 MG/DL (ref 0–99)
LYMPHOCYTES # BLD AUTO: 1.1 X10E3/UL (ref 0.7–3.1)
LYMPHOCYTES NFR BLD AUTO: 13 %
MCH RBC QN AUTO: 29.9 PG (ref 26.6–33)
MCHC RBC AUTO-ENTMCNC: 32.9 G/DL (ref 31.5–35.7)
MCV RBC AUTO: 91 FL (ref 79–97)
MICROALBUMIN UR-MCNC: 17.9 UG/ML
MONOCYTES # BLD AUTO: 0.7 X10E3/UL (ref 0.1–0.9)
MONOCYTES NFR BLD AUTO: 8 %
NEUTROPHILS # BLD AUTO: 6.2 X10E3/UL (ref 1.4–7)
NEUTROPHILS NFR BLD AUTO: 72 %
PLATELET # BLD AUTO: 188 X10E3/UL (ref 150–450)
POTASSIUM SERPL-SCNC: 4.9 MMOL/L (ref 3.5–5.2)
PROT SERPL-MCNC: 6.5 G/DL (ref 6–8.5)
RBC # BLD AUTO: 4.98 X10E6/UL (ref 4.14–5.8)
SODIUM SERPL-SCNC: 137 MMOL/L (ref 134–144)
TRIGL SERPL-MCNC: 115 MG/DL (ref 0–149)
TSH SERPL DL<=0.005 MIU/L-ACNC: 1.89 UIU/ML (ref 0.45–4.5)
VLDLC SERPL CALC-MCNC: 21 MG/DL (ref 5–40)
WBC # BLD AUTO: 8.6 X10E3/UL (ref 3.4–10.8)

## 2023-07-11 ENCOUNTER — OFFICE VISIT (OUTPATIENT)
Dept: FAMILY MEDICINE | Facility: CLINIC | Age: 78
End: 2023-07-11
Payer: MEDICARE

## 2023-07-11 VITALS
HEART RATE: 81 BPM | HEIGHT: 69 IN | DIASTOLIC BLOOD PRESSURE: 66 MMHG | SYSTOLIC BLOOD PRESSURE: 126 MMHG | OXYGEN SATURATION: 95 % | WEIGHT: 204.13 LBS | BODY MASS INDEX: 30.23 KG/M2

## 2023-07-11 DIAGNOSIS — N18.32 TYPE 2 DIABETES MELLITUS WITH STAGE 3B CHRONIC KIDNEY DISEASE, WITHOUT LONG-TERM CURRENT USE OF INSULIN: Primary | ICD-10-CM

## 2023-07-11 DIAGNOSIS — E78.5 DYSLIPIDEMIA: ICD-10-CM

## 2023-07-11 DIAGNOSIS — L30.9 DERMATITIS: ICD-10-CM

## 2023-07-11 DIAGNOSIS — R35.1 NOCTURIA: ICD-10-CM

## 2023-07-11 DIAGNOSIS — E11.22 TYPE 2 DIABETES MELLITUS WITH STAGE 3B CHRONIC KIDNEY DISEASE, WITHOUT LONG-TERM CURRENT USE OF INSULIN: Primary | ICD-10-CM

## 2023-07-11 DIAGNOSIS — I10 ESSENTIAL HYPERTENSION: ICD-10-CM

## 2023-07-11 DIAGNOSIS — B37.0 ORAL THRUSH: ICD-10-CM

## 2023-07-11 PROCEDURE — 99214 OFFICE O/P EST MOD 30 MIN: CPT | Mod: S$PBB,,, | Performed by: NURSE PRACTITIONER

## 2023-07-11 PROCEDURE — 99214 PR OFFICE/OUTPT VISIT, EST, LEVL IV, 30-39 MIN: ICD-10-PCS | Mod: S$PBB,,, | Performed by: NURSE PRACTITIONER

## 2023-07-11 PROCEDURE — 99214 OFFICE O/P EST MOD 30 MIN: CPT | Performed by: NURSE PRACTITIONER

## 2023-07-11 RX ORDER — DOXAZOSIN 2 MG/1
2 TABLET ORAL NIGHTLY
Qty: 90 TABLET | Refills: 1 | Status: SHIPPED | OUTPATIENT
Start: 2023-07-11 | End: 2023-10-09 | Stop reason: SDUPTHER

## 2023-07-11 RX ORDER — LISINOPRIL 10 MG/1
10 TABLET ORAL DAILY
Qty: 90 TABLET | Refills: 1 | Status: SHIPPED | OUTPATIENT
Start: 2023-07-11 | End: 2023-10-09 | Stop reason: SDUPTHER

## 2023-07-11 RX ORDER — METFORMIN HYDROCHLORIDE 1000 MG/1
1000 TABLET ORAL 2 TIMES DAILY
Qty: 180 TABLET | Refills: 1 | Status: SHIPPED | OUTPATIENT
Start: 2023-07-11 | End: 2023-09-29 | Stop reason: SDUPTHER

## 2023-07-11 RX ORDER — TRIAMCINOLONE ACETONIDE 1 MG/G
CREAM TOPICAL 2 TIMES DAILY
Qty: 80 G | Refills: 1 | Status: SHIPPED | OUTPATIENT
Start: 2023-07-11 | End: 2023-10-09 | Stop reason: SDUPTHER

## 2023-07-11 RX ORDER — PRAVASTATIN SODIUM 20 MG/1
20 TABLET ORAL NIGHTLY
Qty: 90 TABLET | Refills: 1 | Status: SHIPPED | OUTPATIENT
Start: 2023-07-11 | End: 2023-10-09 | Stop reason: SDUPTHER

## 2023-07-11 RX ORDER — METOPROLOL TARTRATE 25 MG/1
12.5 TABLET, FILM COATED ORAL 2 TIMES DAILY
Qty: 90 TABLET | Refills: 1 | Status: SHIPPED | OUTPATIENT
Start: 2023-07-11 | End: 2023-10-09 | Stop reason: SDUPTHER

## 2023-07-11 RX ORDER — FLUCONAZOLE 150 MG/1
150 TABLET ORAL WEEKLY
Qty: 6 TABLET | Refills: 0 | Status: SHIPPED | OUTPATIENT
Start: 2023-07-11 | End: 2023-08-16

## 2023-07-12 NOTE — PROGRESS NOTES
SUBJECTIVE:      Patient ID: Theo Chen Jr. is a 78 y.o. male.    Chief Complaint: Diabetes (6 month f/u DM)    Presents for DM recheck & lab review - A1c 7.5 up from 7.2 in January, fasting glucose 190, creatinine 1.42, estimated GFR 50    Discussed outstanding health maintenance     Diabetes  He presents for his follow-up diabetic visit. He has type 2 diabetes mellitus. No MedicAlert identification noted. His disease course has been fluctuating. Pertinent negatives for hypoglycemia include no confusion, dizziness, headaches, nervousness/anxiousness or pallor. Pertinent negatives for diabetes include no chest pain, no fatigue, no polydipsia, no polyuria and no weakness. There are no hypoglycemic complications. Symptoms are stable. There are no diabetic complications. Risk factors for coronary artery disease include diabetes mellitus, dyslipidemia, family history, hypertension, male sex, sedentary lifestyle and obesity. Current diabetic treatment includes oral agent (triple therapy). He is compliant with treatment all of the time. His weight is fluctuating minimally. He is following a generally healthy diet. He has not had a previous visit with a dietitian. He never participates in exercise. An ACE inhibitor/angiotensin II receptor blocker is being taken. He does not see a podiatrist.Eye exam is current.   Hypertension  This is a chronic problem. The current episode started more than 1 year ago. The problem is controlled. Pertinent negatives include no chest pain, headaches, neck pain or shortness of breath. Risk factors for coronary artery disease include diabetes mellitus, dyslipidemia, family history, male gender, sedentary lifestyle and obesity. Past treatments include ACE inhibitors, alpha 1 blockers and beta blockers. The current treatment provides moderate improvement. Compliance problems include diet and exercise.  Hypertensive end-organ damage includes heart failure.   Hyperlipidemia  This is a  chronic problem. The current episode started more than 1 year ago. The problem is controlled. Recent lipid tests were reviewed and are normal. Exacerbating diseases include diabetes and obesity. Factors aggravating his hyperlipidemia include fatty foods and beta blockers. Pertinent negatives include no chest pain, myalgias or shortness of breath. Current antihyperlipidemic treatment includes statins. The current treatment provides significant improvement of lipids. Compliance problems include adherence to diet and adherence to exercise.  Risk factors for coronary artery disease include diabetes mellitus, dyslipidemia, male sex, hypertension, obesity and a sedentary lifestyle.     Past Surgical History:   Procedure Laterality Date    ABDOMINAL AORTOGRAPHY N/A 9/9/2020    Procedure: Aortogram, Abdominal;  Surgeon: Carlitos Jaime MD;  Location: [facility] CATH/EP LAB;  Service: Cardiology;  Laterality: N/A;    APPENDECTOMY      CATHETERIZATION OF BOTH LEFT AND RIGHT HEART N/A 9/9/2020    Procedure: CATHETERIZATION, HEART, BOTH LEFT AND RIGHT;  Surgeon: Carlitos Jaime MD;  Location: [facility] CATH/EP LAB;  Service: Cardiology;  Laterality: N/A;    CYSTOSCOPY N/A 6/10/2020    Procedure: CYSTOSCOPY;  Surgeon: Hunter Guerrero Jr., MD;  [facility];  Service: Urology;  Laterality: N/A;    CYSTOSCOPY N/A 11/16/2022    Procedure: CYSTOSCOPY;  Surgeon: Hunter Guerrero Jr., MD;  [facility];  Service: Urology;  Laterality: N/A;    EYE SURGERY Left     TRANSRECTAL ULTRASOUND EXAMINATION N/A 6/10/2020    Procedure: ULTRASOUND, RECTAL APPROACH;  Surgeon: Hunter Guerrero Jr., MD;  [facility];  Service: Urology;  Laterality: N/A;    TRANSRECTAL ULTRASOUND EXAMINATION N/A 11/16/2022    Procedure: ULTRASOUND, RECTAL APPROACH;  Surgeon: Hunter Guerrero Jr., MD;  [facility];  Service: Urology;  Laterality: N/A;     Family History   Problem Relation Age of Onset    Hypertension Mother     Heart disease Father       Social  History     Socioeconomic History    Marital status:    Occupational History    Occupation: retired    Tobacco Use    Smoking status: Former     Packs/day: 2.00     Years: 45.00     Pack years: 90.00     Types: Cigarettes     Quit date:      Years since quittin.5    Smokeless tobacco: Never   Substance and Sexual Activity    Alcohol use: No    Drug use: No     Current Outpatient Medications   Medication Sig Dispense Refill    aspirin (ECOTRIN) 81 MG EC tablet Take 1 tablet (81 mg total) by mouth 2 (two) times daily.      finasteride (PROSCAR) 5 mg tablet Take 1 tablet (5 mg total) by mouth every evening. 90 tablet 3    flash glucose scanning reader (FREESTYLE GIANNI 14 DAY READER) Misc 1 each by Misc.(Non-Drug; Combo Route) route once daily. 1 each 0    flash glucose sensor (FREESTYLE GIANNI 14 DAY SENSOR) Kit 1 each by Misc.(Non-Drug; Combo Route) route every 14 (fourteen) days. 2 kit 2    glipiZIDE (GLUCOTROL) 10 MG tablet Take one tablet by mouth twice daily 180 tablet 0    metoclopramide HCl (REGLAN) 10 MG tablet TAKE ONE TABLET BY MOUTH TWICE DAILY 60 tablet 1    montelukast (SINGULAIR) 10 mg tablet Take one tablet by mouth once daily 90 tablet 0    tamsulosin (FLOMAX) 0.4 mg Cap Take 2 capsules (0.8 mg total) by mouth every evening. DO NOT CRUSH OR CHEW; SWALLOW WHOLE. 180 capsule 3    doxazosin (CARDURA) 2 MG tablet Take 1 tablet (2 mg total) by mouth every evening. 90 tablet 1    fluconazole (DIFLUCAN) 150 MG Tab Take 1 tablet (150 mg total) by mouth once a week. for 6 doses 6 tablet 0    lisinopriL 10 MG tablet Take 1 tablet (10 mg total) by mouth once daily. 90 tablet 1    metFORMIN (GLUCOPHAGE) 1000 MG tablet Take 1 tablet (1,000 mg total) by mouth 2 (two) times daily. 180 tablet 1    metoprolol tartrate (LOPRESSOR) 25 MG tablet Take 0.5 tablets (12.5 mg total) by mouth 2 (two) times daily. 90 tablet 1    pravastatin (PRAVACHOL) 20 MG tablet Take 1 tablet (20 mg total) by mouth every  evening. 90 tablet 1    SITagliptin phosphate (JANUVIA) 100 MG Tab Take 1 tablet (100 mg total) by mouth once daily. 90 tablet 1    triamcinolone acetonide 0.1% (KENALOG) 0.1 % cream Apply topically 2 (two) times daily. 80 g 1     No current facility-administered medications for this visit.     Review of patient's allergies indicates:  No Known Allergies   Past Medical History:   Diagnosis Date    COPD (chronic obstructive pulmonary disease)     Diabetes mellitus, type 2     Gastroenteritis     Seasonal allergic rhinitis due to pollen     Shingles      Past Surgical History:   Procedure Laterality Date    ABDOMINAL AORTOGRAPHY N/A 9/9/2020    Procedure: Aortogram, Abdominal;  Surgeon: Carlitos Jaime MD;  Location: [facility] CATH/EP LAB;  Service: Cardiology;  Laterality: N/A;    APPENDECTOMY      CATHETERIZATION OF BOTH LEFT AND RIGHT HEART N/A 9/9/2020    Procedure: CATHETERIZATION, HEART, BOTH LEFT AND RIGHT;  Surgeon: Carlitos Jaime MD;  Location: [facility] CATH/EP LAB;  Service: Cardiology;  Laterality: N/A;    CYSTOSCOPY N/A 6/10/2020    Procedure: CYSTOSCOPY;  Surgeon: Hunter Guerrero Jr., MD;  Location: [facility] OR;  Service: Urology;  Laterality: N/A;    CYSTOSCOPY N/A 11/16/2022    Procedure: CYSTOSCOPY;  Surgeon: Hunter Guerrero Jr., MD;  Location: [facility] OR;  Service: Urology;  Laterality: N/A;    EYE SURGERY Left     TRANSRECTAL ULTRASOUND EXAMINATION N/A 6/10/2020    Procedure: ULTRASOUND, RECTAL APPROACH;  Surgeon: Hunter Guerrero Jr., MD;  Location: [facility] OR;  Service: Urology;  Laterality: N/A;    TRANSRECTAL ULTRASOUND EXAMINATION N/A 11/16/2022    Procedure: ULTRASOUND, RECTAL APPROACH;  Surgeon: Hunter Guerrero Jr., MD;  Location: [facility] OR;  Service: Urology;  Laterality: N/A;       Review of Systems   Constitutional:  Negative for activity change, appetite change, fatigue and fever.   HENT:  Negative for congestion, ear pain, hearing loss, postnasal drip, sinus pressure, sinus pain, sneezing and sore throat.        "  Thrush at times r/t Trelegy   Eyes:  Negative for photophobia and pain.        Blind in left eye   Respiratory:  Negative for cough, chest tightness, shortness of breath and wheezing.    Cardiovascular:  Negative for chest pain and leg swelling.   Gastrointestinal:  Negative for abdominal distention, abdominal pain, blood in stool, constipation, diarrhea, nausea and vomiting.   Endocrine: Negative for cold intolerance, heat intolerance, polydipsia and polyuria.   Genitourinary:  Negative for difficulty urinating, dysuria, flank pain, frequency, hematuria and urgency.        Nocturia   Musculoskeletal:  Negative for arthralgias, back pain, joint swelling, myalgias and neck pain.   Skin:  Positive for rash (intermittent BLE). Negative for pallor.   Allergic/Immunologic: Negative for environmental allergies and food allergies.   Neurological:  Negative for dizziness, weakness, light-headedness and headaches.   Hematological:  Does not bruise/bleed easily.   Psychiatric/Behavioral:  Negative for confusion, decreased concentration and sleep disturbance. The patient is not nervous/anxious.     OBJECTIVE:      Vitals:    07/11/23 0831   BP: 126/66   BP Location: Right arm   Patient Position: Sitting   BP Method: Large (Manual)   Pulse: 81   SpO2: 95%   Weight: 92.6 kg (204 lb 1.6 oz)   Height: 5' 9" (1.753 m)     Physical Exam  Vitals and nursing note reviewed.   Constitutional:       General: He is not in acute distress.     Appearance: Normal appearance. He is well-developed and well-groomed. He is obese.   HENT:      Head: Normocephalic and atraumatic.      Right Ear: Hearing normal.      Left Ear: Hearing normal.      Nose: Nose normal. No rhinorrhea.   Eyes:      General: Lids are normal.         Right eye: No discharge.         Left eye: No discharge.      Conjunctiva/sclera: Conjunctivae normal.      Right eye: Right conjunctiva is not injected.      Left eye: Left conjunctiva is not injected.      Pupils: Pupils " are equal, round, and reactive to light. Pupils are equal.      Right eye: Pupil is round and reactive.      Left eye: Pupil is round and reactive.      Comments: Exotropia left eye   Neck:      Thyroid: No thyromegaly.      Vascular: No JVD.      Trachea: Trachea normal. No tracheal deviation.   Cardiovascular:      Rate and Rhythm: Normal rate and regular rhythm.      Pulses:           Radial pulses are 2+ on the right side and 2+ on the left side.        Dorsalis pedis pulses are 2+ on the right side and 2+ on the left side.      Heart sounds: Normal heart sounds. No murmur heard.    No friction rub. No gallop.   Pulmonary:      Effort: Pulmonary effort is normal. No respiratory distress.      Breath sounds: Normal breath sounds. No stridor. No decreased breath sounds, wheezing, rhonchi or rales.   Abdominal:      General: Bowel sounds are normal. There is no distension.      Palpations: Abdomen is soft. Abdomen is not rigid.      Tenderness: There is no abdominal tenderness. There is no guarding.   Musculoskeletal:         General: Normal range of motion.      Cervical back: Normal range of motion and neck supple.   Lymphadenopathy:      Cervical: No cervical adenopathy.   Skin:     General: Skin is warm and dry.      Capillary Refill: Capillary refill takes less than 2 seconds.      Coloration: Skin is not pale.      Findings: Rash present. No lesion. Rash is macular (mahamed shins).   Neurological:      Mental Status: He is alert and oriented to person, place, and time.      GCS: GCS eye subscore is 4. GCS verbal subscore is 5. GCS motor subscore is 6.      Sensory: Sensation is intact.      Motor: Motor function is intact. No atrophy.      Coordination: Coordination normal.      Gait: Gait is intact. Gait normal.   Psychiatric:         Attention and Perception: Attention normal. He is attentive.         Mood and Affect: Mood and affect normal.         Speech: Speech normal.         Behavior: Behavior normal.          Thought Content: Thought content normal.         Cognition and Memory: Cognition normal.         Judgment: Judgment normal.      Assessment:       1. Type 2 diabetes mellitus with stage 3b chronic kidney disease, without long-term current use of insulin    2. Essential hypertension    3. Dyslipidemia    4. Nocturia    5. Oral thrush    6. Dermatitis        Plan:       Type 2 diabetes mellitus with stage 3b chronic kidney disease, without long-term current use of insulin  -     lisinopriL 10 MG tablet; Take 1 tablet (10 mg total) by mouth once daily.  Dispense: 90 tablet; Refill: 1  -     metFORMIN (GLUCOPHAGE) 1000 MG tablet; Take 1 tablet (1,000 mg total) by mouth 2 (two) times daily.  Dispense: 180 tablet; Refill: 1  -     SITagliptin phosphate (JANUVIA) 100 MG Tab; Take 1 tablet (100 mg total) by mouth once daily.  Dispense: 90 tablet; Refill: 1    Essential hypertension  -     doxazosin (CARDURA) 2 MG tablet; Take 1 tablet (2 mg total) by mouth every evening.  Dispense: 90 tablet; Refill: 1  -     lisinopriL 10 MG tablet; Take 1 tablet (10 mg total) by mouth once daily.  Dispense: 90 tablet; Refill: 1  -     metoprolol tartrate (LOPRESSOR) 25 MG tablet; Take 0.5 tablets (12.5 mg total) by mouth 2 (two) times daily.  Dispense: 90 tablet; Refill: 1    Dyslipidemia  -     pravastatin (PRAVACHOL) 20 MG tablet; Take 1 tablet (20 mg total) by mouth every evening.  Dispense: 90 tablet; Refill: 1    Nocturia  -     doxazosin (CARDURA) 2 MG tablet; Take 1 tablet (2 mg total) by mouth every evening.  Dispense: 90 tablet; Refill: 1    Oral thrush  -     fluconazole (DIFLUCAN) 150 MG Tab; Take 1 tablet (150 mg total) by mouth once a week. for 6 doses  Dispense: 6 tablet; Refill: 0    Dermatitis  -     triamcinolone acetonide 0.1% (KENALOG) 0.1 % cream; Apply topically 2 (two) times daily.  Dispense: 80 g; Refill: 1                Follow up in about 6 months (around 1/11/2024) for DM.      7/11/2023 Carmen Chamorro  APRN, FNP-C

## 2023-08-03 ENCOUNTER — PATIENT MESSAGE (OUTPATIENT)
Dept: FAMILY MEDICINE | Facility: CLINIC | Age: 78
End: 2023-08-03

## 2023-08-22 RX ORDER — METOCLOPRAMIDE 10 MG/1
TABLET ORAL
Qty: 60 TABLET | Refills: 0 | Status: SHIPPED | OUTPATIENT
Start: 2023-08-22 | End: 2023-10-09 | Stop reason: SDUPTHER

## 2023-09-20 ENCOUNTER — PATIENT MESSAGE (OUTPATIENT)
Dept: FAMILY MEDICINE | Facility: CLINIC | Age: 78
End: 2023-09-20

## 2023-09-21 DIAGNOSIS — R35.1 NOCTURIA: ICD-10-CM

## 2023-09-21 DIAGNOSIS — N18.32 TYPE 2 DIABETES MELLITUS WITH STAGE 3B CHRONIC KIDNEY DISEASE, WITHOUT LONG-TERM CURRENT USE OF INSULIN: ICD-10-CM

## 2023-09-21 DIAGNOSIS — E11.22 TYPE 2 DIABETES MELLITUS WITH STAGE 3B CHRONIC KIDNEY DISEASE, WITHOUT LONG-TERM CURRENT USE OF INSULIN: ICD-10-CM

## 2023-09-22 RX ORDER — METFORMIN HYDROCHLORIDE 1000 MG/1
1000 TABLET ORAL 2 TIMES DAILY
Qty: 180 TABLET | Refills: 1 | OUTPATIENT
Start: 2023-09-22

## 2023-09-22 RX ORDER — FINASTERIDE 5 MG/1
5 TABLET, FILM COATED ORAL NIGHTLY
Qty: 90 TABLET | Refills: 3 | OUTPATIENT
Start: 2023-09-22 | End: 2024-09-21

## 2023-09-29 ENCOUNTER — PATIENT MESSAGE (OUTPATIENT)
Dept: FAMILY MEDICINE | Facility: CLINIC | Age: 78
End: 2023-09-29

## 2023-09-29 DIAGNOSIS — E11.22 TYPE 2 DIABETES MELLITUS WITH STAGE 3B CHRONIC KIDNEY DISEASE, WITHOUT LONG-TERM CURRENT USE OF INSULIN: ICD-10-CM

## 2023-09-29 DIAGNOSIS — N18.32 TYPE 2 DIABETES MELLITUS WITH STAGE 3B CHRONIC KIDNEY DISEASE, WITHOUT LONG-TERM CURRENT USE OF INSULIN: ICD-10-CM

## 2023-10-02 RX ORDER — METFORMIN HYDROCHLORIDE 1000 MG/1
1000 TABLET ORAL 2 TIMES DAILY
Qty: 180 TABLET | Refills: 0 | Status: SHIPPED | OUTPATIENT
Start: 2023-10-02 | End: 2024-01-04

## 2023-10-03 ENCOUNTER — TELEPHONE (OUTPATIENT)
Dept: FAMILY MEDICINE | Facility: CLINIC | Age: 78
End: 2023-10-03

## 2023-10-03 NOTE — TELEPHONE ENCOUNTER
----- Message from Rachel Villar sent at 10/2/2023 10:13 AM CDT -----  Regarding: med refill  Patient is needing a refill on metFORMIN (GLUCOPHAGE) 1000 MG tablet sent to   [facility] on [address]. Please advise.    Thank you

## 2023-10-09 ENCOUNTER — PATIENT MESSAGE (OUTPATIENT)
Dept: FAMILY MEDICINE | Facility: CLINIC | Age: 78
End: 2023-10-09

## 2023-10-09 DIAGNOSIS — J43.9 PULMONARY EMPHYSEMA, UNSPECIFIED EMPHYSEMA TYPE: ICD-10-CM

## 2023-10-09 DIAGNOSIS — N18.32 TYPE 2 DIABETES MELLITUS WITH STAGE 3B CHRONIC KIDNEY DISEASE, WITHOUT LONG-TERM CURRENT USE OF INSULIN: ICD-10-CM

## 2023-10-09 DIAGNOSIS — I10 ESSENTIAL HYPERTENSION: ICD-10-CM

## 2023-10-09 DIAGNOSIS — E11.22 TYPE 2 DIABETES MELLITUS WITH STAGE 3B CHRONIC KIDNEY DISEASE, WITHOUT LONG-TERM CURRENT USE OF INSULIN: ICD-10-CM

## 2023-10-09 DIAGNOSIS — L30.9 DERMATITIS: ICD-10-CM

## 2023-10-09 DIAGNOSIS — R35.1 NOCTURIA: ICD-10-CM

## 2023-10-09 DIAGNOSIS — E78.5 DYSLIPIDEMIA: ICD-10-CM

## 2023-10-10 RX ORDER — DOXAZOSIN 2 MG/1
2 TABLET ORAL NIGHTLY
Qty: 90 TABLET | Refills: 0 | Status: SHIPPED | OUTPATIENT
Start: 2023-10-10 | End: 2024-02-19

## 2023-10-10 RX ORDER — PRAVASTATIN SODIUM 20 MG/1
20 TABLET ORAL NIGHTLY
Qty: 90 TABLET | Refills: 0 | Status: SHIPPED | OUTPATIENT
Start: 2023-10-10 | End: 2024-01-11 | Stop reason: SDUPTHER

## 2023-10-10 RX ORDER — LISINOPRIL 10 MG/1
10 TABLET ORAL DAILY
Qty: 90 TABLET | Refills: 0 | Status: SHIPPED | OUTPATIENT
Start: 2023-10-10 | End: 2024-01-11 | Stop reason: SDUPTHER

## 2023-10-10 RX ORDER — GLIPIZIDE 10 MG/1
10 TABLET ORAL 2 TIMES DAILY
Qty: 180 TABLET | Refills: 0 | Status: SHIPPED | OUTPATIENT
Start: 2023-10-10 | End: 2024-01-11 | Stop reason: SDUPTHER

## 2023-10-10 RX ORDER — METOCLOPRAMIDE 10 MG/1
10 TABLET ORAL 2 TIMES DAILY
Qty: 60 TABLET | Refills: 0 | Status: SHIPPED | OUTPATIENT
Start: 2023-10-10 | End: 2024-01-11

## 2023-10-10 RX ORDER — TRIAMCINOLONE ACETONIDE 1 MG/G
CREAM TOPICAL 2 TIMES DAILY
Qty: 80 G | Refills: 0 | Status: SHIPPED | OUTPATIENT
Start: 2023-10-10 | End: 2024-01-11

## 2023-10-10 RX ORDER — MONTELUKAST SODIUM 10 MG/1
10 TABLET ORAL DAILY
Qty: 90 TABLET | Refills: 0 | Status: SHIPPED | OUTPATIENT
Start: 2023-10-10 | End: 2024-01-08

## 2023-10-10 RX ORDER — METOPROLOL TARTRATE 25 MG/1
12.5 TABLET, FILM COATED ORAL 2 TIMES DAILY
Qty: 90 TABLET | Refills: 0 | Status: SHIPPED | OUTPATIENT
Start: 2023-10-10 | End: 2024-01-11 | Stop reason: SDUPTHER

## 2023-10-11 ENCOUNTER — PATIENT MESSAGE (OUTPATIENT)
Dept: FAMILY MEDICINE | Facility: CLINIC | Age: 78
End: 2023-10-11

## 2023-10-11 DIAGNOSIS — R35.1 NOCTURIA: ICD-10-CM

## 2023-10-12 RX ORDER — FINASTERIDE 5 MG/1
5 TABLET, FILM COATED ORAL NIGHTLY
Qty: 90 TABLET | Refills: 0 | Status: SHIPPED | OUTPATIENT
Start: 2023-10-12 | End: 2024-01-11 | Stop reason: SDUPTHER

## 2023-10-20 ENCOUNTER — HOSPITAL ENCOUNTER (OUTPATIENT)
Dept: RADIOLOGY | Facility: HOSPITAL | Age: 78
Discharge: HOME | End: 2023-10-20
Attending: NURSE PRACTITIONER
Payer: MEDICARE

## 2023-10-20 DIAGNOSIS — M79.651 PAIN IN RIGHT THIGH: Primary | ICD-10-CM

## 2023-10-20 DIAGNOSIS — M79.651 PAIN IN RIGHT THIGH: ICD-10-CM

## 2023-10-20 PROCEDURE — 93971 EXTREMITY STUDY: CPT | Mod: TC,RT

## 2023-11-07 ENCOUNTER — OFFICE VISIT (OUTPATIENT)
Dept: FAMILY MEDICINE | Facility: CLINIC | Age: 78
End: 2023-11-07
Payer: MEDICARE

## 2023-11-07 VITALS
TEMPERATURE: 98 F | RESPIRATION RATE: 14 BRPM | DIASTOLIC BLOOD PRESSURE: 66 MMHG | BODY MASS INDEX: 29.92 KG/M2 | WEIGHT: 202 LBS | OXYGEN SATURATION: 96 % | HEART RATE: 92 BPM | SYSTOLIC BLOOD PRESSURE: 106 MMHG | HEIGHT: 69 IN

## 2023-11-07 DIAGNOSIS — M79.651 RIGHT THIGH PAIN: ICD-10-CM

## 2023-11-07 DIAGNOSIS — M25.551 RIGHT HIP PAIN: Primary | ICD-10-CM

## 2023-11-07 PROCEDURE — 99214 OFFICE O/P EST MOD 30 MIN: CPT | Performed by: INTERNAL MEDICINE

## 2023-11-07 PROCEDURE — 99213 PR OFFICE/OUTPT VISIT, EST, LEVL III, 20-29 MIN: ICD-10-PCS | Mod: S$PBB,AQ,, | Performed by: INTERNAL MEDICINE

## 2023-11-07 PROCEDURE — 99213 OFFICE O/P EST LOW 20 MIN: CPT | Mod: S$PBB,AQ,, | Performed by: INTERNAL MEDICINE

## 2023-11-07 RX ORDER — PREDNISONE 20 MG/1
20 TABLET ORAL DAILY
Qty: 5 TABLET | Refills: 0 | Status: SHIPPED | OUTPATIENT
Start: 2023-11-07 | End: 2024-01-11

## 2023-11-07 RX ORDER — CYCLOBENZAPRINE HCL 5 MG
5 TABLET ORAL NIGHTLY PRN
COMMUNITY
Start: 2023-10-20 | End: 2024-01-11

## 2023-11-07 RX ORDER — MELOXICAM 15 MG/1
15 TABLET ORAL DAILY PRN
COMMUNITY
Start: 2023-10-20 | End: 2024-01-11

## 2023-11-08 ENCOUNTER — TELEPHONE (OUTPATIENT)
Dept: FAMILY MEDICINE | Facility: CLINIC | Age: 78
End: 2023-11-08

## 2023-11-09 ENCOUNTER — TELEPHONE (OUTPATIENT)
Dept: FAMILY MEDICINE | Facility: CLINIC | Age: 78
End: 2023-11-09

## 2023-11-09 ENCOUNTER — PATIENT MESSAGE (OUTPATIENT)
Dept: FAMILY MEDICINE | Facility: CLINIC | Age: 78
End: 2023-11-09

## 2023-11-09 NOTE — TELEPHONE ENCOUNTER
Blood sugar was 211 on 11-7-2023  Last night it was 237 took half metformin and half glipizide  This am it is 179 at 6am

## 2023-11-14 ENCOUNTER — OFFICE VISIT (OUTPATIENT)
Dept: PULMONOLOGY | Facility: CLINIC | Age: 78
End: 2023-11-14
Payer: MEDICARE

## 2023-11-14 VITALS
HEART RATE: 90 BPM | BODY MASS INDEX: 30.13 KG/M2 | SYSTOLIC BLOOD PRESSURE: 122 MMHG | DIASTOLIC BLOOD PRESSURE: 80 MMHG | OXYGEN SATURATION: 98 % | WEIGHT: 204 LBS

## 2023-11-14 DIAGNOSIS — R06.09 DYSPNEA ON EFFORT: ICD-10-CM

## 2023-11-14 DIAGNOSIS — J44.9 CHRONIC OBSTRUCTIVE PULMONARY DISEASE, UNSPECIFIED COPD TYPE: Primary | ICD-10-CM

## 2023-11-14 DIAGNOSIS — I27.29 OTHER SECONDARY PULMONARY HYPERTENSION: ICD-10-CM

## 2023-11-14 PROCEDURE — 99213 OFFICE O/P EST LOW 20 MIN: CPT | Mod: S$GLB,,, | Performed by: INTERNAL MEDICINE

## 2023-11-14 PROCEDURE — 99213 PR OFFICE/OUTPT VISIT, EST, LEVL III, 20-29 MIN: ICD-10-PCS | Mod: S$GLB,,, | Performed by: INTERNAL MEDICINE

## 2023-11-14 NOTE — PROGRESS NOTES
Office Visit Note *    Patient Name: Theo Chen Jr.  MRN: [id number]  : 1945      Reason for visit: COPD    HPI:     2020 - Referred here for evaluation of COPD.  Had spirometry done at Dr Donovan's office showing mild/moderate obstruction improved by dilators.  He started on TRELEGY about 3 weeks ago and has noted improvement in his symptoms.  Also has albuterol inhaler but really have used it since starting TRELEGY.  Had nasal procedure done at Dr Donovan's office and has noted improvement in his cough.  Sputum is usually clear to milky white but this has also improved with his recent treatments.  No h/o hemoptysis.  Had recent CT chest (see report below).  He still has some SOB but also better on present meds.  He is overweight and does not exercise regularly.  He has no h/o sleep apnea, denies any significant snoring, no reported history apneas.  Denies excessive somnolence but does have decreased activity after noon.    10/22/2020 - Here for follow up,  Pt is currently stable on present medications with no recent increases in their symptoms or use of rescue medications.  Since our last visit there have been no hospitalizations or ER visits for their respiratory issues and there does not seem to be anything to suggest unrecognized exacerbations.  I have reviewed the medical regimen and re-educated the pt on the role of rescue and controlling medications.  Inhaler technique and understanding seems adequate.  The patient reports no issues with any of there medications for their COPD.  Refills will be taken care of as needed.  All questions answered.  Does get occasional thrush with TRELEGY.  Patient has no known corona virus exposures and has been practicing social distancing.  We have discussed the virus and precautions and all questions have been answered.  Reviewed PFT with pt.    2021 - Here for follow, Pt is currently stable on present medications with no recent increases in their  symptoms or use of rescue medications.  Since our last visit there have been no hospitalizations or ER visits for their respiratory issues and there does not seem to be anything to suggest unrecognized exacerbations.  I have reviewed the medical regimen and re-educated the pt on the role of rescue and controlling medications.  Inhaler technique and understanding seems adequate.  The patient reports no issues with any of there medications for their COPD.  Refills will be taken care of as needed.  All questions answered.  Does have exertional dyspnea and we discussed diet and exercise.  Patient has no known corona virus exposures and has been practicing social distancing.  We have discussed the virus and precautions and all questions have been answered.    10/25/2021 - Here for follow up, Pt is currently stable on present medications with no recent increases in their symptoms or use of rescue medications.  Since our last visit there have been no hospitalizations or ER visits for their respiratory issues and there does not seem to be anything to suggest unrecognized exacerbations.  I have reviewed the medical regimen and re-educated the pt on the role of rescue and controlling medications.  Inhaler technique and understanding seems adequate.  The patient reports no issues with any of there medications for their COPD.  Refills will be taken care of as needed.  All questions answered.  He is not very active (we discussed this).  Patient has no known corona virus exposures and has been practicing social distancing.  We have discussed the virus and precautions and all questions have been answered.  Has had Covid vaccine and booster.    5/24/2022 - Here for follow, Pt is currently stable on present medications with no recent increases in their symptoms or use of rescue medications.  Since our last visit there have been no hospitalizations or ER visits for their respiratory issues and there does not seem to be anything to  suggest unrecognized exacerbations.  I have reviewed the medical regimen and re-educated the pt on the role of rescue and controlling medications.  Inhaler technique and understanding seems adequate.  The patient reports no issues with any of there medications for their COPD.  Refills will be taken care of as needed.  All questions answered.  Feels good.  Patient has no known corona virus exposures and has been practicing social distancing.  We have discussed the virus and precautions and all questions have been answered.  Has had Covid vaccine and booster.We discussed the second booster.    11/14/2022 - Here for follow up, Pt is currently stable on present medications with no recent increases in their symptoms or use of rescue medications.  Since our last visit there have been no hospitalizations or ER visits for their respiratory issues and there does not seem to be anything to suggest unrecognized exacerbations.  I have reviewed the medical regimen and re-educated the pt on the role of rescue and controlling medications.  Inhaler technique and understanding seems adequate.  The patient reports no issues with any of there medications for their COPD.  Refills will be taken care of as needed.  All questions answered.  He is to have prostate procedure this week.  Patient has no known corona virus exposures and has been practicing social distancing.  We have discussed the virus and precautions and all questions have been answered.  He denies any hospitalizations or new problems.    11/14/2023 - Here for follow up, Pt is currently stable on present medications with no recent increases in their symptoms or use of rescue medications.  Since our last visit there have been no hospitalizations or ER visits for their respiratory issues and there does not seem to be anything to suggest unrecognized exacerbations.  I have reviewed the medical regimen and re-educated the pt on the role of rescue and controlling medications.   Inhaler technique and understanding seems adequate.  The patient reports no issues with any of there medications for their COPD.  Refills will be taken care of as needed.  All questions answered.  Had mild COPD by prior PFT.  He quit smoking  15-20 years ago.  We discussed vaccines.  Has had a pretty good year, no hospitalization, 1 Urgent Care visit for arthritis issues.      COPD Flowsheet    GOLD A    Last PFT - 10/2020  FEV1- 85 % DLCO - 41 %     + ICS/LABA/LAMA    + prn ALBUTEROL    mMRC -  0 - SOB with strenuous exercise   - + 1 - SOB level ground, slight hill   -  2 - SOB walk slower or stop for breath level ground   -  3 - SOB at 100 yards or after few minutes   -  4 - SOB in house, dressing    Referral to PULMONARY REHABILITATION -  NO    Tested for alpha-1-antitrypsin - NO  Result - na    Past Medical History    Past Medical History:   Diagnosis Date    COPD (chronic obstructive pulmonary disease)     Diabetes mellitus, type 2     Gastroenteritis     Seasonal allergic rhinitis due to pollen     Shingles        Past Surgical History    Past Surgical History:   Procedure Laterality Date    ABDOMINAL AORTOGRAPHY N/A 9/9/2020    Procedure: Aortogram, Abdominal;  Surgeon: Carlitos Jaime MD;  Location: [facility] CATH/EP LAB;  Service: Cardiology;  Laterality: N/A;    APPENDECTOMY      CATHETERIZATION OF BOTH LEFT AND RIGHT HEART N/A 9/9/2020    Procedure: CATHETERIZATION, HEART, BOTH LEFT AND RIGHT;  Surgeon: Carlitos Jaime MD;  Location: [facility] CATH/EP LAB;  Service: Cardiology;  Laterality: N/A;    CYSTOSCOPY N/A 6/10/2020    Procedure: CYSTOSCOPY;  Surgeon: Hunter Guerrero Jr., MD;  Location: [facility] OR;  Service: Urology;  Laterality: N/A;    CYSTOSCOPY N/A 11/16/2022    Procedure: CYSTOSCOPY;  Surgeon: Hunter Guerrero Jr., MD;  Location: [facility] OR;  Service: Urology;  Laterality: N/A;    EYE SURGERY Left     TRANSRECTAL ULTRASOUND EXAMINATION N/A 6/10/2020    Procedure: ULTRASOUND, RECTAL APPROACH;  Surgeon: Hunter DOUGLAS  Cesar Melgar MD;  Location: [facility] OR;  Service: Urology;  Laterality: N/A;    TRANSRECTAL ULTRASOUND EXAMINATION N/A 11/16/2022    Procedure: ULTRASOUND, RECTAL APPROACH;  Surgeon: Hunter Guerrero Jr., MD;  Location: [facility] OR;  Service: Urology;  Laterality: N/A;       Medications      Current Outpatient Medications:     aspirin (ECOTRIN) 81 MG EC tablet, Take 1 tablet (81 mg total) by mouth 2 (two) times daily., Disp: , Rfl:     cyclobenzaprine (FLEXERIL) 5 MG tablet, Take 5 mg by mouth nightly as needed., Disp: , Rfl:     doxazosin (CARDURA) 2 MG tablet, Take 1 tablet (2 mg total) by mouth every evening., Disp: 90 tablet, Rfl: 0    finasteride (PROSCAR) 5 mg tablet, Take 1 tablet (5 mg total) by mouth every evening., Disp: 90 tablet, Rfl: 0    flash glucose scanning reader (FREESTYLE GIANNI 14 DAY READER) Misc, 1 each by Misc.(Non-Drug; Combo Route) route once daily., Disp: 1 each, Rfl: 0    flash glucose sensor (FREESTYLE GIANNI 14 DAY SENSOR) Kit, 1 each by Misc.(Non-Drug; Combo Route) route every 14 (fourteen) days., Disp: 2 kit, Rfl: 2    glipiZIDE (GLUCOTROL) 10 MG tablet, Take 1 tablet (10 mg total) by mouth 2 (two) times daily., Disp: 180 tablet, Rfl: 0    lisinopriL 10 MG tablet, Take 1 tablet (10 mg total) by mouth once daily., Disp: 90 tablet, Rfl: 0    meloxicam (MOBIC) 15 MG tablet, Take 15 mg by mouth daily as needed., Disp: , Rfl:     metFORMIN (GLUCOPHAGE) 1000 MG tablet, Take 1 tablet (1,000 mg total) by mouth 2 (two) times daily., Disp: 180 tablet, Rfl: 0    metoclopramide HCl (REGLAN) 10 MG tablet, Take 1 tablet (10 mg total) by mouth 2 (two) times daily., Disp: 60 tablet, Rfl: 0    metoprolol tartrate (LOPRESSOR) 25 MG tablet, Take 0.5 tablets (12.5 mg total) by mouth 2 (two) times daily., Disp: 90 tablet, Rfl: 0    montelukast (SINGULAIR) 10 mg tablet, Take 1 tablet (10 mg total) by mouth once daily., Disp: 90 tablet, Rfl: 0    pravastatin (PRAVACHOL) 20 MG tablet, Take 1 tablet (20 mg total) by mouth  every evening., Disp: 90 tablet, Rfl: 0    SITagliptin phosphate (JANUVIA) 100 MG Tab, Take 1 tablet (100 mg total) by mouth once daily., Disp: 90 tablet, Rfl: 0    tamsulosin (FLOMAX) 0.4 mg Cap, Take 2 capsules (0.8 mg total) by mouth every evening. DO NOT CRUSH OR CHEW; SWALLOW WHOLE., Disp: 180 capsule, Rfl: 3    triamcinolone acetonide 0.1% (KENALOG) 0.1 % cream, Apply topically 2 (two) times daily., Disp: 80 g, Rfl: 0    predniSONE (DELTASONE) 20 MG tablet, Take 1 tablet (20 mg total) by mouth once daily. 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   HENT:  Negative for congestion.    Eyes:  Negative for pain.   Respiratory:  Positive for cough and shortness of breath. Negative for hemoptysis, sputum production, wheezing and stridor.    Cardiovascular:  Negative for chest pain, palpitations, orthopnea, claudication, leg swelling and PND.   Gastrointestinal:  Negative for abdominal pain, blood in stool, constipation, diarrhea, heartburn, nausea and vomiting.   Genitourinary:  Negative for dysuria, frequency, hematuria and urgency.   Musculoskeletal:  Negative for falls and myalgias.    Neurological:  Negative for dizziness, tingling, tremors, sensory change, speech change, focal weakness, seizures, loss of consciousness, weakness and headaches.   Endo/Heme/Allergies:  Negative for environmental allergies.   Psychiatric/Behavioral:  Negative for depression, substance abuse and suicidal ideas. The patient is not nervous/anxious.        Physical Exam    Vitals:    11/14/23 0852   BP: 122/80   BP Location: Left arm   Patient Position: Sitting   BP Method: Medium (Manual)   Pulse: 90   SpO2: 98%   Weight: 92.5 kg (204 lb)       Physical Exam  Vitals and nursing note reviewed.   Constitutional:       General: He is not in acute distress.     Appearance: Normal appearance. He is well-developed. He is obese. He is not ill-appearing, toxic-appearing or diaphoretic.   HENT:      Head: Normocephalic and atraumatic.      Right Ear: External ear normal.      Left Ear: External ear normal.      Nose: Nose normal. No congestion or rhinorrhea.   Eyes:      General: No scleral icterus.        Right eye: No discharge.         Left eye: No discharge.      Extraocular Movements: Extraocular movements intact.      Conjunctiva/sclera: Conjunctivae normal.      Pupils: Pupils are equal, round, and reactive to light.   Neck:      Thyroid: No thyromegaly.      Vascular: No JVD.      Trachea: No tracheal deviation.   Cardiovascular:      Rate and Rhythm: Normal rate and regular rhythm.      Pulses: Normal pulses.      Heart sounds: Normal heart sounds. No murmur heard.     No friction rub. No gallop.   Pulmonary:      Effort: Pulmonary effort is normal. No respiratory distress.      Breath sounds: Normal breath sounds. No stridor. No wheezing, rhonchi or rales.   Chest:      Chest wall: No tenderness.   Abdominal:      General: Bowel sounds are normal. There is no distension.      Palpations: Abdomen is soft.      Tenderness: There is no abdominal tenderness. There is no guarding.   Musculoskeletal:         General: No  tenderness. Normal range of motion.      Cervical back: Normal range of motion and neck supple.      Right lower leg: No edema.      Left lower leg: No edema.   Lymphadenopathy:      Cervical: No cervical adenopathy.   Skin:     General: Skin is warm and dry.      Coloration: Skin is not jaundiced.      Findings: No bruising.   Neurological:      General: No focal deficit present.      Mental Status: He is alert and oriented to person, place, and time. Mental status is at baseline.      Cranial Nerves: No cranial nerve deficit.      Deep Tendon Reflexes: Reflexes are normal and symmetric.   Psychiatric:         Mood and Affect: Mood normal.         Behavior: Behavior normal.         Thought Content: Thought content normal.         Judgment: Judgment normal.         Labs    Lab Results   Component Value Date    WBC 8.6 07/07/2023    HGB 14.9 07/07/2023    HCT 45.3 07/07/2023     07/07/2023       Sodium   Date Value Ref Range Status   07/07/2023 137 134 - 144 mmol/L Final     Potassium   Date Value Ref Range Status   07/07/2023 4.9 3.5 - 5.2 mmol/L Final     Chloride   Date Value Ref Range Status   07/07/2023 99 96 - 106 mmol/L Final     CO2   Date Value Ref Range Status   07/07/2023 24 20 - 29 mmol/L Final     Glucose   Date Value Ref Range Status   07/07/2023 198 (H) 70 - 99 mg/dL Final     BUN   Date Value Ref Range Status   07/07/2023 17 8 - 27 mg/dL Final     Creatinine   Date Value Ref Range Status   07/07/2023 1.42 (H) 0.76 - 1.27 mg/dL Final     Calcium   Date Value Ref Range Status   07/07/2023 9.8 8.6 - 10.2 mg/dL Final     Total Protein   Date Value Ref Range Status   06/30/2021 6.9 6.0 - 8.4 g/dL Final     Albumin   Date Value Ref Range Status   07/07/2023 4.5 3.7 - 4.7 g/dL Final     Comment:                    **Effective July 10, 2023 Albumin reference interval**                   will be changing to:                              Age                  Male          Female                              0 -   7 days       3.6 - 4.9      3.6 - 4.9                             8 -  30 days       3.5 - 4.6      3.5 - 4.6                             1 -   6 months     3.7 - 4.8      3.7 - 4.8                      7 months -   2 years      4.0 - 5.0      4.0 - 5.0                             3 -   5 years      4.1 - 5.0      4.1 - 5.0                             6 -  12 years      4.2 - 5.0      4.2 - 5.0                            13 -  30 years      4.3 - 5.2      4.0 - 5.0                            31 -  50 years      4.1 - 5.1      3.9 - 4.9                            51 -  60 years      3.8 - 4.9      3.8 - 4.9                            61 -  70 years      3.9 - 4.9      3.9 - 4.9                            71 -  80 years      3.8 - 4.8      3.8 - 4.8                            81 -  89 years      3.7 - 4.7      3.7 - 4.7                            90 - 199 years      3.6 - 4.6      3.6 - 4.6     06/30/2021 4.1 3.5 - 5.2 g/dL Final     Total Bilirubin   Date Value Ref Range Status   07/07/2023 0.3 0.0 - 1.2 mg/dL Final     Alkaline Phosphatase   Date Value Ref Range Status   06/30/2021 51 (L) 55 - 135 U/L Final     AST   Date Value Ref Range Status   07/07/2023 18 0 - 40 IU/L Final     ALT   Date Value Ref Range Status   07/07/2023 19 0 - 44 IU/L Final     Anion Gap   Date Value Ref Range Status   06/30/2021 12 8 - 16 mmol/L Final       Xrays    CT chest (7/10/20)   1. 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 This flattens the  superior vena cava and exerts mass effect on the left atrium and  superior left pulmonary vein. Cardiology or cardiothoracic surgery  consultation is recommended.  2. There is calcification of the left main/descending coronary artery.  3. There is right worse than left panlobular emphysema. Consultation  with pulmonary medicine is advised this has not occurred previously.       Impression/Plan    Problem List Items Addressed This Visit          Pulmonary    COPD (chronic obstructive  pulmonary disease) - Primary     Clinically stable on no meds  RTC 1 year            Cardiac/Vascular    Dyspnea on effort     At his baseline         Other secondary pulmonary hypertension     Aware                   Raffaele Carrillo MD

## 2024-01-03 DIAGNOSIS — E11.22 TYPE 2 DIABETES MELLITUS WITH STAGE 3B CHRONIC KIDNEY DISEASE, WITHOUT LONG-TERM CURRENT USE OF INSULIN: ICD-10-CM

## 2024-01-03 DIAGNOSIS — N18.32 TYPE 2 DIABETES MELLITUS WITH STAGE 3B CHRONIC KIDNEY DISEASE, WITHOUT LONG-TERM CURRENT USE OF INSULIN: ICD-10-CM

## 2024-01-04 RX ORDER — METFORMIN HYDROCHLORIDE 1000 MG/1
1000 TABLET ORAL 2 TIMES DAILY
Qty: 180 TABLET | Refills: 0 | Status: SHIPPED | OUTPATIENT
Start: 2024-01-04 | End: 2024-01-11 | Stop reason: SDUPTHER

## 2024-01-11 ENCOUNTER — OFFICE VISIT (OUTPATIENT)
Dept: FAMILY MEDICINE | Facility: CLINIC | Age: 79
End: 2024-01-11
Payer: MEDICARE

## 2024-01-11 VITALS
HEIGHT: 69 IN | SYSTOLIC BLOOD PRESSURE: 124 MMHG | DIASTOLIC BLOOD PRESSURE: 66 MMHG | HEART RATE: 92 BPM | WEIGHT: 201.5 LBS | BODY MASS INDEX: 29.84 KG/M2 | OXYGEN SATURATION: 95 %

## 2024-01-11 DIAGNOSIS — E11.22 TYPE 2 DIABETES MELLITUS WITH STAGE 3B CHRONIC KIDNEY DISEASE, WITHOUT LONG-TERM CURRENT USE OF INSULIN: Primary | ICD-10-CM

## 2024-01-11 DIAGNOSIS — R35.1 NOCTURIA: ICD-10-CM

## 2024-01-11 DIAGNOSIS — N18.32 TYPE 2 DIABETES MELLITUS WITH STAGE 3B CHRONIC KIDNEY DISEASE, WITHOUT LONG-TERM CURRENT USE OF INSULIN: Primary | ICD-10-CM

## 2024-01-11 DIAGNOSIS — M62.81 MUSCLE WEAKNESS (GENERALIZED): ICD-10-CM

## 2024-01-11 DIAGNOSIS — I10 ESSENTIAL HYPERTENSION: ICD-10-CM

## 2024-01-11 DIAGNOSIS — R29.898 LOWER EXTREMITY DYSFUNCTION: ICD-10-CM

## 2024-01-11 DIAGNOSIS — Z00.00 ENCOUNTER FOR MEDICARE ANNUAL WELLNESS EXAM: ICD-10-CM

## 2024-01-11 DIAGNOSIS — E78.5 DYSLIPIDEMIA: ICD-10-CM

## 2024-01-11 LAB — HBA1C MFR BLD: 7.9 %

## 2024-01-11 PROCEDURE — 99215 OFFICE O/P EST HI 40 MIN: CPT | Mod: PBBFAC,PN | Performed by: NURSE PRACTITIONER

## 2024-01-11 PROCEDURE — 83036 HEMOGLOBIN GLYCOSYLATED A1C: CPT | Mod: PBBFAC,PN | Performed by: NURSE PRACTITIONER

## 2024-01-11 PROCEDURE — 99999PBSHW POCT HEMOGLOBIN A1C: Mod: PBBFAC,,,

## 2024-01-11 PROCEDURE — 99999 PR PBB SHADOW E&M-EST. PATIENT-LVL V: CPT | Mod: PBBFAC,,, | Performed by: NURSE PRACTITIONER

## 2024-01-11 PROCEDURE — 99214 OFFICE O/P EST MOD 30 MIN: CPT | Mod: S$PBB,,, | Performed by: NURSE PRACTITIONER

## 2024-01-11 RX ORDER — MONTELUKAST SODIUM 10 MG/1
10 TABLET ORAL NIGHTLY
COMMUNITY
End: 2024-01-30

## 2024-01-11 RX ORDER — PRAVASTATIN SODIUM 20 MG/1
20 TABLET ORAL NIGHTLY
Qty: 90 TABLET | Refills: 0 | Status: SHIPPED | OUTPATIENT
Start: 2024-01-11 | End: 2024-05-16

## 2024-01-11 RX ORDER — METOPROLOL TARTRATE 25 MG/1
12.5 TABLET, FILM COATED ORAL 2 TIMES DAILY
Qty: 90 TABLET | Refills: 0 | Status: SHIPPED | OUTPATIENT
Start: 2024-01-11 | End: 2024-02-29 | Stop reason: SDUPTHER

## 2024-01-11 RX ORDER — GLIPIZIDE 10 MG/1
15 TABLET ORAL 2 TIMES DAILY
Qty: 270 TABLET | Refills: 0 | Status: SHIPPED | OUTPATIENT
Start: 2024-01-11

## 2024-01-11 RX ORDER — METFORMIN HYDROCHLORIDE 1000 MG/1
1000 TABLET ORAL 2 TIMES DAILY
Qty: 180 TABLET | Refills: 0 | Status: SHIPPED | OUTPATIENT
Start: 2024-01-11

## 2024-01-11 RX ORDER — FINASTERIDE 5 MG/1
5 TABLET, FILM COATED ORAL NIGHTLY
Qty: 90 TABLET | Refills: 0 | Status: SHIPPED | OUTPATIENT
Start: 2024-01-11 | End: 2024-02-09 | Stop reason: SDUPTHER

## 2024-01-11 RX ORDER — LISINOPRIL 10 MG/1
10 TABLET ORAL DAILY
Qty: 90 TABLET | Refills: 0 | Status: SHIPPED | OUTPATIENT
Start: 2024-01-11 | End: 2024-05-16

## 2024-01-12 LAB
BASOPHILS # BLD AUTO: 0.1 X10E3/UL (ref 0–0.2)
BASOPHILS NFR BLD AUTO: 1 %
BUN SERPL-MCNC: 14 MG/DL (ref 8–27)
BUN/CREAT SERPL: 9 (ref 10–24)
CALCIUM SERPL-MCNC: 9.4 MG/DL (ref 8.6–10.2)
CHLORIDE SERPL-SCNC: 101 MMOL/L (ref 96–106)
CO2 SERPL-SCNC: 21 MMOL/L (ref 20–29)
CREAT SERPL-MCNC: 1.5 MG/DL (ref 0.76–1.27)
EOSINOPHIL # BLD AUTO: 0.5 X10E3/UL (ref 0–0.4)
EOSINOPHIL NFR BLD AUTO: 5 %
ERYTHROCYTE [DISTWIDTH] IN BLOOD BY AUTOMATED COUNT: 13.2 % (ref 11.6–15.4)
EST. GFR  (NO RACE VARIABLE): 47 ML/MIN/1.73
GLUCOSE SERPL-MCNC: 148 MG/DL (ref 70–99)
HCT VFR BLD AUTO: 46.9 % (ref 37.5–51)
HGB BLD-MCNC: 15.3 G/DL (ref 13–17.7)
IMM GRANULOCYTES # BLD AUTO: 0 X10E3/UL (ref 0–0.1)
IMM GRANULOCYTES NFR BLD AUTO: 0 %
LYMPHOCYTES # BLD AUTO: 1.3 X10E3/UL (ref 0.7–3.1)
LYMPHOCYTES NFR BLD AUTO: 13 %
MCH RBC QN AUTO: 29.4 PG (ref 26.6–33)
MCHC RBC AUTO-ENTMCNC: 32.6 G/DL (ref 31.5–35.7)
MCV RBC AUTO: 90 FL (ref 79–97)
MONOCYTES # BLD AUTO: 0.8 X10E3/UL (ref 0.1–0.9)
MONOCYTES NFR BLD AUTO: 8 %
NEUTROPHILS # BLD AUTO: 7.2 X10E3/UL (ref 1.4–7)
NEUTROPHILS NFR BLD AUTO: 73 %
PLATELET # BLD AUTO: 258 X10E3/UL (ref 150–450)
POTASSIUM SERPL-SCNC: 4.6 MMOL/L (ref 3.5–5.2)
RBC # BLD AUTO: 5.2 X10E6/UL (ref 4.14–5.8)
SODIUM SERPL-SCNC: 139 MMOL/L (ref 134–144)
WBC # BLD AUTO: 9.9 X10E3/UL (ref 3.4–10.8)

## 2024-01-16 NOTE — PROGRESS NOTES
"    SUBJECTIVE:      Patient ID: Theo Chen Jr. is a 78 y.o. male.    Chief Complaint: Diabetes (6 month f/u DM)    Presents for DM recheck & problem visit. Sister present at visit & contributing somewhat to history. Pt states he was sick before R[place]hawn (thinks he caught a cold from his sister) and was feeling a bit off balance intermittently when he stood from a seated or lying down position. He remembers walking to the bathroom with no issues on 12/24 aside from the "woozy feeling", but states on his way back to the living room his legs completely gave out and he fell in the ko. He was unable to feel his legs for what he thinks was several hours and lie on the floor until he regained feeling in his legs. He denies any injury to his body, and states he doesn't recall straining in the bathroom or having any issues toileting that day. He states his legs weren't tingling, it was as if they weren't there at all. After what felt like a long time, he was able to get himself up and to the living room. No residual weakness. Was able to speak without difficulty as he states he was hollering for his sister to come help him off the floor. No recent dietary changes, but he admits he hasn't been hydrating as well as he should. His sister states he drinks about 32 oz of water at most daily, but more often around 16 oz. He is a coffee drinker throughout the day. Both are concerned this fall may have been something neurological as it happened suddenly and without warning. Pt describes the sensation as the lower half of his body disappearing altogether.    Today's A1c 7.9 slightly up from 7.5 in July.    Discussed outstanding health maintenance     Diabetes  He presents for his follow-up diabetic visit. He has type 2 diabetes mellitus. No MedicAlert identification noted. His disease course has been fluctuating. Pertinent negatives for hypoglycemia include no confusion, dizziness, headaches, nervousness/anxiousness or " pallor. Associated symptoms include weakness. Pertinent negatives for diabetes include no chest pain, no fatigue, no polydipsia and no polyuria. There are no hypoglycemic complications. Symptoms are stable. There are no diabetic complications. Risk factors for coronary artery disease include diabetes mellitus, dyslipidemia, family history, hypertension, male sex, sedentary lifestyle and obesity. Current diabetic treatment includes oral agent (triple therapy). He is compliant with treatment all of the time. His weight is fluctuating minimally. He is following a generally healthy diet. When asked about meal planning, he reported none. He has not had a previous visit with a dietitian. He never participates in exercise. An ACE inhibitor/angiotensin II receptor blocker is being taken. He does not see a podiatrist.Eye exam is current.   Hypertension  This is a chronic problem. The current episode started more than 1 year ago. The problem has been gradually improving since onset. The problem is controlled. Pertinent negatives include no chest pain, headaches, neck pain or shortness of breath. Risk factors for coronary artery disease include diabetes mellitus, dyslipidemia, family history, male gender, sedentary lifestyle and obesity. Past treatments include ACE inhibitors, alpha 1 blockers and beta blockers. The current treatment provides moderate improvement. Compliance problems include diet and exercise.  Hypertensive end-organ damage includes heart failure.   Hyperlipidemia  This is a chronic problem. The current episode started more than 1 year ago. Exacerbating diseases include diabetes and obesity. Factors aggravating his hyperlipidemia include fatty foods and beta blockers. Pertinent negatives include no chest pain, myalgias or shortness of breath. Current antihyperlipidemic treatment includes statins. Compliance problems include adherence to diet and adherence to exercise.  Risk factors for coronary artery disease  include diabetes mellitus, dyslipidemia, male sex, hypertension, obesity and a sedentary lifestyle.   Fall  The accident occurred More than 1 week ago. The fall occurred while walking. He fell from a height of 1 to 2 ft. He landed on [place]. There was no blood loss. The patient is experiencing no pain. Pertinent negatives include no abdominal pain, fever, headaches, hematuria, nausea or vomiting. He has tried rest for the symptoms. The treatment provided significant relief.       Past Surgical History:   Procedure Laterality Date    ABDOMINAL AORTOGRAPHY N/A 9/9/2020    Procedure: Aortogram, Abdominal;  Surgeon: Carlitos Jaime MD;  Location: [facility] CATH/EP LAB;  Service: Cardiology;  Laterality: N/A;    APPENDECTOMY      CATHETERIZATION OF BOTH LEFT AND RIGHT HEART N/A 9/9/2020    Procedure: CATHETERIZATION, HEART, BOTH LEFT AND RIGHT;  Surgeon: Carlitos Jaime MD;  Location: [facility] CATH/EP LAB;  Service: Cardiology;  Laterality: N/A;    CYSTOSCOPY N/A 6/10/2020    Procedure: CYSTOSCOPY;  Surgeon: Hunter Guerrero Jr., MD;  Location: [facility] OR;  Service: Urology;  Laterality: N/A;    CYSTOSCOPY N/A 11/16/2022    Procedure: CYSTOSCOPY;  Surgeon: Hunter Guerrero Jr., MD;  Location: [facility] OR;  Service: Urology;  Laterality: N/A;    EYE SURGERY Left     TRANSRECTAL ULTRASOUND EXAMINATION N/A 6/10/2020    Procedure: ULTRASOUND, RECTAL APPROACH;  Surgeon: Hunter Guerrero Jr., MD;  Location: [facility] OR;  Service: Urology;  Laterality: N/A;    TRANSRECTAL ULTRASOUND EXAMINATION N/A 11/16/2022    Procedure: ULTRASOUND, RECTAL APPROACH;  Surgeon: Hunter Guerrero Jr., MD;  Location: [facility] OR;  Service: Urology;  Laterality: N/A;     Family History   Problem Relation Age of Onset    Hypertension Mother     Heart disease Father       Social History     Socioeconomic History    Marital status:    Occupational History    Occupation: retired    Tobacco Use    Smoking status: Former     Current packs/day: 0.00     Average packs/day: 2.0  packs/day for 45.0 years (90.0 ttl pk-yrs)     Types: Cigarettes     Start date:      Quit date:      Years since quittin.0    Smokeless tobacco: Never   Substance and Sexual Activity    Alcohol use: No    Drug use: No     Current Outpatient Medications   Medication Sig Dispense Refill    aspirin (ECOTRIN) 81 MG EC tablet Take 1 tablet (81 mg total) by mouth 2 (two) times daily.      doxazosin (CARDURA) 2 MG tablet Take 1 tablet (2 mg total) by mouth every evening. 90 tablet 0    flash glucose scanning reader (FREESTYLE GIANNI 14 DAY READER) Misc 1 each by Misc.(Non-Drug; Combo Route) route once daily. 1 each 0    flash glucose sensor (FREESTYLE GIANNI 14 DAY SENSOR) Kit 1 each by Misc.(Non-Drug; Combo Route) route every 14 (fourteen) days. 2 kit 2    montelukast (SINGULAIR) 10 mg tablet Take 10 mg by mouth every evening.      tamsulosin (FLOMAX) 0.4 mg Cap Take 2 capsules (0.8 mg total) by mouth every evening. DO NOT CRUSH OR CHEW; SWALLOW WHOLE. 180 capsule 3    finasteride (PROSCAR) 5 mg tablet Take 1 tablet (5 mg total) by mouth every evening. 90 tablet 0    glipiZIDE (GLUCOTROL) 10 MG tablet Take 1.5 tablets (15 mg total) by mouth 2 (two) times daily. 270 tablet 0    lisinopriL 10 MG tablet Take 1 tablet (10 mg total) by mouth once daily. 90 tablet 0    metFORMIN (GLUCOPHAGE) 1000 MG tablet Take 1 tablet (1,000 mg total) by mouth 2 (two) times daily. 180 tablet 0    metoprolol tartrate (LOPRESSOR) 25 MG tablet Take 0.5 tablets (12.5 mg total) by mouth 2 (two) times daily. 90 tablet 0    pravastatin (PRAVACHOL) 20 MG tablet Take 1 tablet (20 mg total) by mouth every evening. 90 tablet 0    SITagliptin phosphate (JANUVIA) 100 MG Tab Take 1 tablet (100 mg total) by mouth once daily. 90 tablet 0     No current facility-administered medications for this visit.     Review of patient's allergies indicates:  No Known Allergies   Past Medical History:   Diagnosis Date    COPD (chronic obstructive pulmonary  disease)     Diabetes mellitus, type 2     Gastroenteritis     Seasonal allergic rhinitis due to pollen     Shingles      Past Surgical History:   Procedure Laterality Date    ABDOMINAL AORTOGRAPHY N/A 9/9/2020    Procedure: Aortogram, Abdominal;  Surgeon: Carlitos Jaime MD;  Location: [facility] CATH/EP LAB;  Service: Cardiology;  Laterality: N/A;    APPENDECTOMY      CATHETERIZATION OF BOTH LEFT AND RIGHT HEART N/A 9/9/2020    Procedure: CATHETERIZATION, HEART, BOTH LEFT AND RIGHT;  Surgeon: Carlitos Jaime MD;  Location: [facility] CATH/EP LAB;  Service: Cardiology;  Laterality: N/A;    CYSTOSCOPY N/A 6/10/2020    Procedure: CYSTOSCOPY;  Surgeon: Hunter Guerrero Jr., MD;  Location: [facility];  Service: Urology;  Laterality: N/A;    CYSTOSCOPY N/A 11/16/2022    Procedure: CYSTOSCOPY;  Surgeon: Hunter Guerrero Jr., MD;  Location: [facility];  Service: Urology;  Laterality: N/A;    EYE SURGERY Left     TRANSRECTAL ULTRASOUND EXAMINATION N/A 6/10/2020    Procedure: ULTRASOUND, RECTAL APPROACH;  Surgeon: Hunter Guerrero Jr., MD;  Location: [facility];  Service: Urology;  Laterality: N/A;    TRANSRECTAL ULTRASOUND EXAMINATION N/A 11/16/2022    Procedure: ULTRASOUND, RECTAL APPROACH;  Surgeon: Hunter Guerrero Jr., MD;  Location: [facility];  Service: Urology;  Laterality: N/A;       Review of Systems   Constitutional:  Negative for activity change, appetite change, fatigue and fever.   HENT:  Negative for congestion, ear pain, hearing loss, postnasal drip, sinus pressure, sinus pain, sneezing and sore throat.         Thrush at times r/t Trelegy   Eyes:  Negative for photophobia and pain.        Blind in left eye   Respiratory:  Negative for cough, chest tightness, shortness of breath and wheezing.    Cardiovascular:  Negative for chest pain and leg swelling.   Gastrointestinal:  Negative for abdominal distention, abdominal pain, blood in stool, constipation, diarrhea, nausea and vomiting.   Endocrine: Negative for cold intolerance, heat  "intolerance, polydipsia and polyuria.   Genitourinary:  Negative for difficulty urinating, dysuria, flank pain, frequency, hematuria and urgency.        Nocturia   Musculoskeletal:  Negative for arthralgias, back pain, joint swelling, myalgias and neck pain.   Skin:  Positive for rash (intermittent BLE). Negative for pallor.   Allergic/Immunologic: Negative for environmental allergies and food allergies.   Neurological:  Positive for weakness. Negative for dizziness, light-headedness and headaches.   Hematological:  Does not bruise/bleed easily.   Psychiatric/Behavioral:  Negative for confusion, decreased concentration and sleep disturbance. The patient is not nervous/anxious.       OBJECTIVE:      Vitals:    01/11/24 0840   BP: 124/66   BP Location: Right arm   Patient Position: Sitting   BP Method: Large (Manual)   Pulse: 92   SpO2: 95%   Weight: 91.4 kg (201 lb 8 oz)   Height: 5' 9" (1.753 m)     Physical Exam  Vitals and nursing note reviewed.   Constitutional:       General: He is not in acute distress.     Appearance: Normal appearance. He is well-developed, well-groomed and overweight.   HENT:      Head: Normocephalic and atraumatic.      Right Ear: Hearing normal.      Left Ear: Hearing normal.      Nose: Nose normal. No rhinorrhea.   Eyes:      General: Lids are normal.         Right eye: No discharge.         Left eye: No discharge.      Conjunctiva/sclera: Conjunctivae normal.      Right eye: Right conjunctiva is not injected.      Left eye: Left conjunctiva is not injected.      Pupils: Pupils are equal, round, and reactive to light. Pupils are equal.      Right eye: Pupil is round and reactive.      Left eye: Pupil is round and reactive.      Comments: Exotropia left eye   Neck:      Thyroid: No thyromegaly.      Vascular: No JVD.      Trachea: Trachea normal. No tracheal deviation.   Cardiovascular:      Rate and Rhythm: Normal rate and regular rhythm.      Pulses:           Radial pulses are 2+ on the " right side and 2+ on the left side.        Dorsalis pedis pulses are 2+ on the right side and 2+ on the left side.      Heart sounds: Normal heart sounds. No murmur heard.     No friction rub. No gallop.   Pulmonary:      Effort: Pulmonary effort is normal. No respiratory distress.      Breath sounds: Normal breath sounds. No stridor. No decreased breath sounds, wheezing, rhonchi or rales.   Abdominal:      General: Bowel sounds are normal. There is no distension.      Palpations: Abdomen is soft. Abdomen is not rigid.      Tenderness: There is no abdominal tenderness. There is no guarding.   Musculoskeletal:         General: Normal range of motion.      Cervical back: Normal range of motion and neck supple.   Lymphadenopathy:      Cervical: No cervical adenopathy.   Skin:     General: Skin is warm and dry.      Capillary Refill: Capillary refill takes less than 2 seconds.      Coloration: Skin is not pale.      Findings: No lesion or rash.   Neurological:      Mental Status: He is alert and oriented to person, place, and time.      GCS: GCS eye subscore is 4. GCS verbal subscore is 5. GCS motor subscore is 6.      Sensory: Sensation is intact.      Motor: Motor function is intact. No atrophy.      Coordination: Coordination normal.      Gait: Gait is intact. Gait normal.   Psychiatric:         Attention and Perception: Attention normal. He is attentive.         Mood and Affect: Mood and affect normal.         Speech: Speech normal.         Behavior: Behavior normal.         Thought Content: Thought content normal.         Cognition and Memory: Cognition normal.         Judgment: Judgment normal.        Assessment:       1. Type 2 diabetes mellitus with stage 3b chronic kidney disease, without long-term current use of insulin    2. Essential hypertension    3. Dyslipidemia    4. Nocturia    5. Lower extremity dysfunction    6. Muscle weakness (generalized)        Plan:       Type 2 diabetes mellitus with stage 3b  chronic kidney disease, without long-term current use of insulin  -     POCT HEMOGLOBIN A1C  -     SITagliptin phosphate (JANUVIA) 100 MG Tab; Take 1 tablet (100 mg total) by mouth once daily.  Dispense: 90 tablet; Refill: 0  -     metFORMIN (GLUCOPHAGE) 1000 MG tablet; Take 1 tablet (1,000 mg total) by mouth 2 (two) times daily.  Dispense: 180 tablet; Refill: 0  -     lisinopriL 10 MG tablet; Take 1 tablet (10 mg total) by mouth once daily.  Dispense: 90 tablet; Refill: 0  -     glipiZIDE (GLUCOTROL) 10 MG tablet; Take 1.5 tablets (15 mg total) by mouth 2 (two) times daily.  Dispense: 270 tablet; Refill: 0    Essential hypertension  -     metoprolol tartrate (LOPRESSOR) 25 MG tablet; Take 0.5 tablets (12.5 mg total) by mouth 2 (two) times daily.  Dispense: 90 tablet; Refill: 0  -     lisinopriL 10 MG tablet; Take 1 tablet (10 mg total) by mouth once daily.  Dispense: 90 tablet; Refill: 0    Dyslipidemia  -     pravastatin (PRAVACHOL) 20 MG tablet; Take 1 tablet (20 mg total) by mouth every evening.  Dispense: 90 tablet; Refill: 0    Nocturia  -     finasteride (PROSCAR) 5 mg tablet; Take 1 tablet (5 mg total) by mouth every evening.  Dispense: 90 tablet; Refill: 0    Lower extremity dysfunction  -     Ambulatory referral/consult to Neurology; Future; Expected date: 01/18/2024  -     MRI Brain W WO Contrast; Future; Expected date: 01/11/2024    Muscle weakness (generalized)  -     MRI Brain W WO Contrast; Future; Expected date: 01/11/2024  -     BASIC METABOLIC PANEL; Future; Expected date: 01/11/2024  -     CBC Auto Differential; Future; Expected date: 01/11/2024                Follow up in about 3 months (around 4/11/2024) for DM.      1/16/2024 Carmen Chamorro, RONNIE, FNP-C

## 2024-01-30 RX ORDER — MONTELUKAST SODIUM 10 MG/1
10 TABLET ORAL
Qty: 90 TABLET | Refills: 0 | Status: SHIPPED | OUTPATIENT
Start: 2024-01-30 | End: 2024-05-16

## 2024-02-05 ENCOUNTER — PATIENT MESSAGE (OUTPATIENT)
Dept: FAMILY MEDICINE | Facility: CLINIC | Age: 79
End: 2024-02-05
Payer: MEDICARE

## 2024-02-05 DIAGNOSIS — R35.1 NOCTURIA: ICD-10-CM

## 2024-02-09 ENCOUNTER — OFFICE VISIT (OUTPATIENT)
Dept: UROLOGY | Facility: CLINIC | Age: 79
End: 2024-02-09
Payer: MEDICARE

## 2024-02-09 VITALS — HEIGHT: 69 IN | BODY MASS INDEX: 29.77 KG/M2 | WEIGHT: 201 LBS

## 2024-02-09 DIAGNOSIS — R35.1 NOCTURIA: ICD-10-CM

## 2024-02-09 DIAGNOSIS — R39.9 LOWER URINARY TRACT SYMPTOMS (LUTS): Primary | ICD-10-CM

## 2024-02-09 DIAGNOSIS — N48.1 BALANITIS: ICD-10-CM

## 2024-02-09 DIAGNOSIS — N35.911 STRICTURE OF URETHRAL MEATUS IN MALE, UNSPECIFIED STRICTURE TYPE: ICD-10-CM

## 2024-02-09 LAB — POC RESIDUAL URINE VOLUME: 130 ML (ref 0–100)

## 2024-02-09 PROCEDURE — G2211 COMPLEX E/M VISIT ADD ON: HCPCS | Mod: S$PBB,,, | Performed by: UROLOGY

## 2024-02-09 PROCEDURE — 99999 PR PBB SHADOW E&M-EST. PATIENT-LVL III: CPT | Mod: PBBFAC,,, | Performed by: UROLOGY

## 2024-02-09 PROCEDURE — 51798 US URINE CAPACITY MEASURE: CPT | Mod: PBBFAC,PO | Performed by: UROLOGY

## 2024-02-09 PROCEDURE — 99214 OFFICE O/P EST MOD 30 MIN: CPT | Mod: S$PBB,,, | Performed by: UROLOGY

## 2024-02-09 PROCEDURE — 99213 OFFICE O/P EST LOW 20 MIN: CPT | Mod: PBBFAC,PO,25 | Performed by: UROLOGY

## 2024-02-09 PROCEDURE — 99999PBSHW POCT BLADDER SCAN: Mod: PBBFAC,,,

## 2024-02-09 RX ORDER — FINASTERIDE 5 MG/1
5 TABLET, FILM COATED ORAL NIGHTLY
Qty: 90 TABLET | Refills: 3 | Status: SHIPPED | OUTPATIENT
Start: 2024-02-09 | End: 2025-02-08

## 2024-02-09 RX ORDER — CLOTRIMAZOLE AND BETAMETHASONE DIPROPIONATE 10; .64 MG/G; MG/G
CREAM TOPICAL 2 TIMES DAILY
Qty: 45 G | Refills: 1 | Status: SHIPPED | OUTPATIENT
Start: 2024-02-09 | End: 2024-08-07

## 2024-02-09 RX ORDER — TAMSULOSIN HYDROCHLORIDE 0.4 MG/1
0.8 CAPSULE ORAL NIGHTLY
Qty: 180 CAPSULE | Refills: 3 | OUTPATIENT
Start: 2024-02-09 | End: 2025-02-08

## 2024-02-09 RX ORDER — TAMSULOSIN HYDROCHLORIDE 0.4 MG/1
0.8 CAPSULE ORAL NIGHTLY
Qty: 180 CAPSULE | Refills: 3 | Status: SHIPPED | OUTPATIENT
Start: 2024-02-09 | End: 2024-02-29 | Stop reason: SDUPTHER

## 2024-02-09 NOTE — PROGRESS NOTES
[facility] Established Patient/H&P:    Theo Chen Jr. is a 78 y.o. male who presents for follow up for lower urinary tract symptoms.     Patient complains of nocturia x 4, frequency and incomplete emptying for approximately 1 year refractory to Flomax 0.8 MG PO daily and Finasteride 5 mg.      He is extremely bothered by his symptoms and does not notice any relief with medications.      Of note, he has required a pacheco catheter in the past for urinary retention.         Interval History     11/2/22: He underwent cystoscopy and TRUS on 6/10/20 which revealed a 30 cc prostate with coaptation of bilateral lobes, large bladder volume. Plan was for possible Rezum, however patient was lost to follow up due to the COVID pandemic.      Presents now complaining of severe lower urinary tract symptoms - incomplete emptying, frequency, urgency, intermittency, weak stream and nocturia x 4. Drinks only water and tea. He remains on Flomax 0.8 mg and Finasteride 5 mg PO daily. Bothered by his symptoms. Urine dipstick only with glucose today.      1/11/23: He underwent cystoscopy, meatal dilation and transrectal ultrasound on 11/16/22 which revealed meatal stenosis requiring dilation with a urethral dilator. Prostate 52 cc with trilobar hypertrophy, moderate trabeculations and scattered diverticula. It was felt that his symptoms were likely secondary to his balanitis complicated by meatal stenosis. Prescribed Lotrisone and given a meatal dilator to use daily. States his lower urinary tract symptoms have improved significantly since his visit. No longer with weak stream or nocturia. PVR improved to 133 mL from 378 mL prior.     2/9/24: Here today for follow up. States he continues to calibrate his meatus at least once daily. Reports mild to moderate lower urinary tract symptoms that remain stable - weak stream, nocturia x 3 and intermittency. Of note, patient recently had a fall that caused significant damage  to his back.      Patient denies any fever, chills, dysuria, urinary tract infection, recent  trauma or history of  malignancy.         PSA  0.32  6/30/21  0.7                   5/7/19     IPSS    QoL  30        5          11/2/22     PVR  130 mL 2/9/24  133 mL     1/11/23  378 mL    11/2/22  360 mL        6/16/20     Urine culture  No growth        11/2/22    Past Medical History:   Diagnosis Date    COPD (chronic obstructive pulmonary disease)     Diabetes mellitus, type 2     Gastroenteritis     Seasonal allergic rhinitis due to pollen     Shingles        Past Surgical History:   Procedure Laterality Date    ABDOMINAL AORTOGRAPHY N/A 9/9/2020    Procedure: Aortogram, Abdominal;  Surgeon: Carlitos Jaime MD;  Location: [facility] CATH/EP LAB;  Service: Cardiology;  Laterality: N/A;    APPENDECTOMY      CATHETERIZATION OF BOTH LEFT AND RIGHT HEART N/A 9/9/2020    Procedure: CATHETERIZATION, HEART, BOTH LEFT AND RIGHT;  Surgeon: Carlitos Jaime MD;  Location: [facility] CATH/EP LAB;  Service: Cardiology;  Laterality: N/A;    CYSTOSCOPY N/A 6/10/2020    Procedure: CYSTOSCOPY;  Surgeon: Hunter Guerrero Jr., MD;  Location: [facility] OR;  Service: Urology;  Laterality: N/A;    CYSTOSCOPY N/A 11/16/2022    Procedure: CYSTOSCOPY;  Surgeon: Hunter Guerrero Jr., MD;  Location: [facility] OR;  Service: Urology;  Laterality: N/A;    EYE SURGERY Left     TRANSRECTAL ULTRASOUND EXAMINATION N/A 6/10/2020    Procedure: ULTRASOUND, RECTAL APPROACH;  Surgeon: Hunter Guerrero Jr., MD;  Location: [facility] OR;  Service: Urology;  Laterality: N/A;    TRANSRECTAL ULTRASOUND EXAMINATION N/A 11/16/2022    Procedure: ULTRASOUND, RECTAL APPROACH;  Surgeon: Hunter Guerrero Jr., MD;  Location: [facility] OR;  Service: Urology;  Laterality: N/A;       Review of patient's allergies indicates:  No Known Allergies    Medications Reviewed: see MAR    FOCUSED PHYSICAL EXAM:    There were no vitals filed for this visit.  Body mass index is 29.68 kg/m². Weight: 91.2 kg (201 lb)  "Height: 5' 9" (175.3 cm)       General: Alert, cooperative, no distress, appears stated age  Abdomen: Soft, non-tender, no CVA tenderness, non-distended      LABS:    Recent Results (from the past 336 hour(s))   POCT Bladder Scan    Collection Time: 02/09/24  8:50 AM   Result Value Ref Range    POC Residual Urine Volume 130 (A) 0 - 100 mL         Assessment/Diagnosis:    1. Lower urinary tract symptoms (LUTS)  POCT Bladder Scan    finasteride (PROSCAR) 5 mg tablet    tamsulosin (FLOMAX) 0.4 mg Cap      2. Stricture of urethral meatus in male, unspecified stricture type        3. Balanitis  clotrimazole-betamethasone 1-0.05% (LOTRISONE) cream      4. Nocturia  finasteride (PROSCAR) 5 mg tablet    tamsulosin (FLOMAX) 0.4 mg Cap          Plans:    - I spent 30 minutes of the day of this encounter preparing for, treating and managing this patient. Visit today is associated with current or anticipated ongoing medical care related to this patient's single serious condition/complex condition. Extensive discussion with patient regarding the etiology and management of his lower urinary tract symptoms. Explained that LUTS are multifactorial and can be secondary to an enlarged prostate, PO intake of bladder irritants, overactive bladder, constipation, malignancy, trauma, infection, stones or medications.   - LUTS and PVR have improved significantly since his cystoscopy with meatal dilation. Patient has been using a meatal dilator several times weekly to keep his meatus patent.   - Continue Flomax 0.8 mg and Finasteride 5 mg PO daily. Refills ordered.   - Continue to use dilator at least once weekly.   - RTC in 1 year with symptom score and PVR.     "

## 2024-02-16 DIAGNOSIS — I10 ESSENTIAL HYPERTENSION: ICD-10-CM

## 2024-02-16 DIAGNOSIS — R35.1 NOCTURIA: ICD-10-CM

## 2024-02-19 RX ORDER — DOXAZOSIN 2 MG/1
2 TABLET ORAL NIGHTLY
Qty: 90 TABLET | Refills: 0 | Status: SHIPPED | OUTPATIENT
Start: 2024-02-19

## 2024-02-25 NOTE — PROGRESS NOTES
"  SUBJECTIVE:    Patient ID: Theo Chen Jr. is a 78 y.o. male.    Chief Complaint: right hip pain     HPI    Patient complaining of pain " number 10 hurt" sometimes right lateral hip or upper lateral right thigh-this is intermittent-this time two weeks-no hx of injections in the area-sometimes he has problems sleeping due to same-sometimes excedrine taken early make make it quit-the pain may last an hour-and the more he walks on it the more it hurts    Diabetes-rarely checks his glucoses-160 to 85-he describes no issues with the blood sugar-his A1C was 7.9 in January yet his insurance wont cover good agents -so we can add actos 15 mg and increase to 45 slowly-he will see his PCP in April    Hypertension-his pressure is low today-never has any energy but this is nothing new( meds will need review if this is a trend)        Last 3 sets of Vitals        1/11/2024     8:40 AM 2/9/2024     8:49 AM 2/29/2024     8:28 AM   Vitals - 1 value per visit   SYSTOLIC 124  98   DIASTOLIC 66  60   Pulse 92  80   Resp   14   SPO2 95 %  96 %   Weight (lb) 201.5 201 202.5   Weight (kg) 91.4 91.173 91.853   Height 5' 9" (1.753 m) 5' 9" (1.753 m) 5' 9" (1.753 m)   BMI (Calculated) 29.7 29.7 29.9   Pain Score Zero Zero           Office Visit on 02/09/2024   Component Date Value Ref Range Status    POC Residual Urine Volume 02/09/2024 130 (A)  0 - 100 mL Final   Office Visit on 01/11/2024   Component Date Value Ref Range Status    Hemoglobin A1C, POC 01/11/2024 7.9  % Final    Glucose 01/11/2024 148 (H)  70 - 99 mg/dL Final    BUN 01/11/2024 14  8 - 27 mg/dL Final    Creatinine 01/11/2024 1.50 (H)  0.76 - 1.27 mg/dL Final    eGFR 01/11/2024 47 (L)  >59 mL/min/1.73 Final    BUN/Creatinine Ratio 01/11/2024 9 (L)  10 - 24 Final    Sodium 01/11/2024 139  134 - 144 mmol/L Final    Potassium 01/11/2024 4.6  3.5 - 5.2 mmol/L Final    Chloride 01/11/2024 101  96 - 106 mmol/L Final    CO2 01/11/2024 21  20 - 29 mmol/L Final    Calcium " 01/11/2024 9.4  8.6 - 10.2 mg/dL Final    WBC 01/11/2024 9.9  3.4 - 10.8 x10E3/uL Final    RBC 01/11/2024 5.20  4.14 - 5.80 x10E6/uL Final    Hemoglobin 01/11/2024 15.3  13.0 - 17.7 g/dL Final    Hematocrit 01/11/2024 46.9  37.5 - 51.0 % Final    MCV 01/11/2024 90  79 - 97 fL Final    MCH 01/11/2024 29.4  26.6 - 33.0 pg Final    MCHC 01/11/2024 32.6  31.5 - 35.7 g/dL Final    RDW 01/11/2024 13.2  11.6 - 15.4 % Final    Platelets 01/11/2024 258  150 - 450 x10E3/uL Final    Neutrophils 01/11/2024 73  Not Estab. % Final    Lymphs 01/11/2024 13  Not Estab. % Final    Monocytes 01/11/2024 8  Not Estab. % Final    Eos 01/11/2024 5  Not Estab. % Final    Basos 01/11/2024 1  Not Estab. % Final    Neutrophils (Absolute) 01/11/2024 7.2 (H)  1.4 - 7.0 x10E3/uL Final    Lymphs (Absolute) 01/11/2024 1.3  0.7 - 3.1 x10E3/uL Final    Monocytes(Absolute) 01/11/2024 0.8  0.1 - 0.9 x10E3/uL Final    Eos (Absolute) 01/11/2024 0.5 (H)  0.0 - 0.4 x10E3/uL Final    Baso (Absolute) 01/11/2024 0.1  0.0 - 0.2 x10E3/uL Final    Immature Granulocytes 01/11/2024 0  Not Estab. % Final    Immature Grans (Abs) 01/11/2024 0.0  0.0 - 0.1 x10E3/uL Final   Refill on 06/19/2023   Component Date Value Ref Range Status    WBC 07/07/2023 8.6  3.4 - 10.8 x10E3/uL Final    RBC 07/07/2023 4.98  4.14 - 5.80 x10E6/uL Final    Hemoglobin 07/07/2023 14.9  13.0 - 17.7 g/dL Final    Hematocrit 07/07/2023 45.3  37.5 - 51.0 % Final    MCV 07/07/2023 91  79 - 97 fL Final    MCH 07/07/2023 29.9  26.6 - 33.0 pg Final    MCHC 07/07/2023 32.9  31.5 - 35.7 g/dL Final    RDW 07/07/2023 13.3  11.6 - 15.4 % Final    Platelets 07/07/2023 188  150 - 450 x10E3/uL Final    Neutrophils 07/07/2023 72  Not Estab. % Final    Lymphs 07/07/2023 13  Not Estab. % Final    Monocytes 07/07/2023 8  Not Estab. % Final    Eos 07/07/2023 6  Not Estab. % Final    Basos 07/07/2023 1  Not Estab. % Final    Neutrophils (Absolute) 07/07/2023 6.2  1.4 - 7.0 x10E3/uL Final    Lymphs (Absolute)  07/07/2023 1.1  0.7 - 3.1 x10E3/uL Final    Monocytes(Absolute) 07/07/2023 0.7  0.1 - 0.9 x10E3/uL Final    Eos (Absolute) 07/07/2023 0.5 (H)  0.0 - 0.4 x10E3/uL Final    Baso (Absolute) 07/07/2023 0.1  0.0 - 0.2 x10E3/uL Final    Immature Granulocytes 07/07/2023 0  Not Estab. % Final    Immature Grans (Abs) 07/07/2023 0.0  0.0 - 0.1 x10E3/uL Final    Glucose 07/07/2023 198 (H)  70 - 99 mg/dL Final    BUN 07/07/2023 17  8 - 27 mg/dL Final    Creatinine 07/07/2023 1.42 (H)  0.76 - 1.27 mg/dL Final    eGFR 07/07/2023 50 (L)  >59 mL/min/1.73 Final    BUN/Creatinine Ratio 07/07/2023 12  10 - 24 Final    Sodium 07/07/2023 137  134 - 144 mmol/L Final    Potassium 07/07/2023 4.9  3.5 - 5.2 mmol/L Final    Chloride 07/07/2023 99  96 - 106 mmol/L Final    CO2 07/07/2023 24  20 - 29 mmol/L Final    Calcium 07/07/2023 9.8  8.6 - 10.2 mg/dL Final    Protein, Total 07/07/2023 6.5  6.0 - 8.5 g/dL Final    Albumin 07/07/2023 4.5  3.7 - 4.7 g/dL Final    Globulin, Total 07/07/2023 2.0  1.5 - 4.5 g/dL Final    Albumin/Globulin Ratio 07/07/2023 2.3 (H)  1.2 - 2.2 Final    Total Bilirubin 07/07/2023 0.3  0.0 - 1.2 mg/dL Final    Alkaline Phosphatase 07/07/2023 91  44 - 121 IU/L Final    AST 07/07/2023 18  0 - 40 IU/L Final    ALT 07/07/2023 19  0 - 44 IU/L Final    Cholesterol 07/07/2023 148  100 - 199 mg/dL Final    Triglycerides 07/07/2023 115  0 - 149 mg/dL Final    HDL 07/07/2023 46  >39 mg/dL Final    VLDL Cholesterol Yung 07/07/2023 21  5 - 40 mg/dL Final    LDL Calculated 07/07/2023 81  0 - 99 mg/dL Final    TSH 07/07/2023 1.890  0.450 - 4.500 uIU/mL Final    Creatinine, Urine 07/07/2023 51.6  Not Estab. mg/dL Final    Microalb, Ur 07/07/2023 17.9  Not Estab. ug/mL Final    Microalb/Crt. Ratio 07/07/2023 35 (H)  0 - 29 mg/g creat Final    Hemoglobin A1c 07/07/2023 7.5 (H)  4.8 - 5.6 % Final         Past Medical History:   Diagnosis Date    COPD (chronic obstructive pulmonary disease)     Diabetes mellitus, type 2      Gastroenteritis     Seasonal allergic rhinitis due to pollen     Shingles      Past Surgical History:   Procedure Laterality Date    ABDOMINAL AORTOGRAPHY N/A 9/9/2020    Procedure: Aortogram, Abdominal;  Surgeon: Carlitos Jaime MD;  Location: [facility] CATH/EP LAB;  Service: Cardiology;  Laterality: N/A;    APPENDECTOMY      CATHETERIZATION OF BOTH LEFT AND RIGHT HEART N/A 9/9/2020    Procedure: CATHETERIZATION, HEART, BOTH LEFT AND RIGHT;  Surgeon: Carlitos Jaime MD;  Location: [facility] CATH/EP LAB;  Service: Cardiology;  Laterality: N/A;    CYSTOSCOPY N/A 6/10/2020    Procedure: CYSTOSCOPY;  Surgeon: Hunter Guerrero Jr., MD;  Location: [facility] OR;  Service: Urology;  Laterality: N/A;    CYSTOSCOPY N/A 11/16/2022    Procedure: CYSTOSCOPY;  Surgeon: Hunter Guerrero Jr., MD;  Location: [facility];  Service: Urology;  Laterality: N/A;    EYE SURGERY Left     TRANSRECTAL ULTRASOUND EXAMINATION N/A 6/10/2020    Procedure: ULTRASOUND, RECTAL APPROACH;  Surgeon: Hunter Guerrero Jr., MD;  Location: [facility] OR;  Service: Urology;  Laterality: N/A;    TRANSRECTAL ULTRASOUND EXAMINATION N/A 11/16/2022    Procedure: ULTRASOUND, RECTAL APPROACH;  Surgeon: Hunter Guerrero Jr., MD;  Location: [facility];  Service: Urology;  Laterality: N/A;     Family History   Problem Relation Age of Onset    Hypertension Mother     Heart disease Father        Marital Status:   Alcohol History:  reports no history of alcohol use.  Tobacco History:  reports that he quit smoking about 19 years ago. His smoking use included cigarettes. He started smoking about 64 years ago. He has a 90.0 pack-year smoking history. He has never used smokeless tobacco.  Drug History:  reports no history of drug use.    Review of patient's allergies indicates:  No Known Allergies    Current Outpatient Medications:     aspirin (ECOTRIN) 81 MG EC tablet, Take 1 tablet (81 mg total) by mouth 2 (two) times daily., Disp: , Rfl:     clotrimazole-betamethasone 1-0.05% (LOTRISONE) cream,  Apply topically 2 (two) times daily., Disp: 45 g, Rfl: 1    finasteride (PROSCAR) 5 mg tablet, Take 1 tablet (5 mg total) by mouth every evening., Disp: 90 tablet, Rfl: 3    flash glucose scanning reader (FREESTYLE GIANNI 14 DAY READER) Misc, 1 each by Misc.(Non-Drug; Combo Route) route once daily., Disp: 1 each, Rfl: 0    flash glucose sensor (FREESTYLE GIANNI 14 DAY SENSOR) Kit, 1 each by Misc.(Non-Drug; Combo Route) route every 14 (fourteen) days., Disp: 2 kit, Rfl: 2    glipiZIDE (GLUCOTROL) 10 MG tablet, Take 1.5 tablets (15 mg total) by mouth 2 (two) times daily., Disp: 270 tablet, Rfl: 0    lisinopriL 10 MG tablet, Take 1 tablet (10 mg total) by mouth once daily., Disp: 90 tablet, Rfl: 0    metFORMIN (GLUCOPHAGE) 1000 MG tablet, Take 1 tablet (1,000 mg total) by mouth 2 (two) times daily., Disp: 180 tablet, Rfl: 0    montelukast (SINGULAIR) 10 mg tablet, Take 1 tablet by mouth once daily, Disp: 90 tablet, Rfl: 0    pravastatin (PRAVACHOL) 20 MG tablet, Take 1 tablet (20 mg total) by mouth every evening., Disp: 90 tablet, Rfl: 0    SITagliptin phosphate (JANUVIA) 100 MG Tab, Take 1 tablet (100 mg total) by mouth once daily., Disp: 90 tablet, Rfl: 0    doxazosin (CARDURA) 2 MG tablet, Take 1 tablet by mouth in the evening, Disp: 90 tablet, Rfl: 0    metoprolol tartrate (LOPRESSOR) 25 MG tablet, Take 0.5 tablets (12.5 mg total) by mouth 2 (two) times daily., Disp: 90 tablet, Rfl: 0    pioglitazone (ACTOS) 15 MG tablet, Take 1 tablet (15 mg total) by mouth once daily., Disp: 90 tablet, Rfl: 3    predniSONE (DELTASONE) 20 MG tablet, Take 1 tablet (20 mg total) by mouth once daily., Disp: 3 tablet, Rfl: 0    tamsulosin (FLOMAX) 0.4 mg Cap, Take 2 capsules (0.8 mg total) by mouth every evening. DO NOT CRUSH OR CHEW; SWALLOW WHOLE., Disp: 180 capsule, Rfl: 3    traMADoL (ULTRAM) 50 mg tablet, One at bedtime for discomfort to aid with sleeplessness due to pain, Disp: 20 tablet, Rfl: 0    Review of Systems    Constitutional:  Negative for appetite change, chills, diaphoresis, fatigue, fever and unexpected weight change.   HENT:  Negative for congestion, ear pain, hearing loss, nosebleeds, postnasal drip, sinus pressure, sinus pain, sneezing, sore throat and trouble swallowing.    Eyes:  Positive for visual disturbance. Negative for photophobia and pain.   Respiratory:  Positive for shortness of breath. Negative for apnea, cough, choking, chest tightness and wheezing.    Cardiovascular:  Negative for chest pain, palpitations and leg swelling.   Gastrointestinal:  Positive for constipation. Negative for abdominal distention, abdominal pain, blood in stool, diarrhea, nausea and vomiting.   Endocrine: Negative for cold intolerance, heat intolerance, polydipsia and polyuria.   Genitourinary:  Negative for difficulty urinating, dysuria, flank pain, frequency, hematuria and urgency.        Variable stream-needs prostate exam-he has to do self cath device daily-sees    Musculoskeletal:  Positive for arthralgias (HPI). Negative for back pain, joint swelling, myalgias, neck pain and neck stiffness.   Skin:  Negative for pallor and rash.   Allergic/Immunologic: Negative for environmental allergies and food allergies.   Neurological:  Negative for dizziness, tremors, seizures, syncope, speech difficulty, weakness, light-headedness, numbness and headaches.   Hematological:  Does not bruise/bleed easily.   Psychiatric/Behavioral:  Positive for sleep disturbance (nocturia). Negative for agitation, confusion, decreased concentration and suicidal ideas. The patient is not nervous/anxious.           Objective:          7/11/2023     8:31 AM 11/7/2023     1:17 PM 11/14/2023     8:52 AM 1/11/2024     8:40 AM 2/9/2024     8:49 AM 2/29/2024     8:28 AM   Vitals - 1 value per visit   SYSTOLIC 126 106 122 124  98   DIASTOLIC 66 66 80 66  60   Pulse 81 92 90 92  80   Temp  97.6 °F (36.4 °C)       Resp  14    14   SPO2 95 % 96 % 98 % 95 %  96  "%   Weight (lb) 204.1 202 204 201.5 201 202.5   Weight (kg) 92.579 91.627 92.534 91.4 91.173 91.853   Height 5' 9" (1.753 m) 5' 9" (1.753 m)  5' 9" (1.753 m) 5' 9" (1.753 m) 5' 9" (1.753 m)   BMI (Calculated) 30.1 29.8  29.7 29.7 29.9   Pain Score Zero Zero Zero Zero Zero    (    Physical Exam  Vitals and nursing note reviewed.   Constitutional:       Appearance: Normal appearance.   HENT:      Head: Normocephalic and atraumatic.      Nose: Nose normal.      Mouth/Throat:      Mouth: Mucous membranes are moist.   Eyes:      Comments: Ptosis left eye lid   Neck:      Vascular: No carotid bruit.   Cardiovascular:      Rate and Rhythm: Normal rate and regular rhythm.      Pulses: Normal pulses.      Heart sounds: Normal heart sounds. No murmur heard.  Pulmonary:      Effort: Pulmonary effort is normal.      Breath sounds: No wheezing.      Comments: Decreased breath sounds  Abdominal:      General: Bowel sounds are normal. There is no distension.      Palpations: Abdomen is soft. There is no mass.      Tenderness: There is no abdominal tenderness.      Hernia: No hernia is present.   Musculoskeletal:         General: Tenderness (right trochanteric bursal tenderness) present.      Right lower leg: No edema.      Left lower leg: No edema.   Lymphadenopathy:      Cervical: No cervical adenopathy.   Skin:     General: Skin is warm and dry.      Coloration: Skin is not jaundiced or pale.      Findings: No bruising or erythema.   Neurological:      Mental Status: He is alert.      Gait: Gait abnormal.   Psychiatric:         Mood and Affect: Mood normal.         Thought Content: Thought content normal.         Judgment: Judgment normal.           Assessment:       1. Greater trochanteric bursitis of right hip    2. Type 2 diabetes mellitus with stage 3 chronic kidney disease, without long-term current use of insulin, unspecified whether stage 3a or 3b CKD    3. Essential hypertension    4. Lower urinary tract symptoms (LUTS)  "   5. Nocturia    6. Dyspnea on effort    7. Panlobular emphysema         Plan:       Greater trochanteric bursitis of right hip  -     predniSONE (DELTASONE) 20 MG tablet; Take 1 tablet (20 mg total) by mouth once daily.  Dispense: 3 tablet; Refill: 0  -     Ambulatory referral/consult to Orthopedics; Future; Expected date: 03/07/2024  -     traMADoL (ULTRAM) 50 mg tablet; One at bedtime for discomfort to aid with sleeplessness due to pain  Dispense: 20 tablet; Refill: 0    Type 2 diabetes mellitus with stage 3 chronic kidney disease, without long-term current use of insulin, unspecified whether stage 3a or 3b CKD  -     Cancel: Hemoglobin A1C; Future; Expected date: 02/29/2024  -     Cancel: Basic Metabolic Panel; Future; Expected date: 02/29/2024  -     Cancel: Microalbumin/Creatinine Ratio, Urine; Future; Expected date: 02/29/2024  -     Cancel: Lipid Panel; Future; Expected date: 02/29/2024  -     pioglitazone (ACTOS) 15 MG tablet; Take 1 tablet (15 mg total) by mouth once daily.  Dispense: 90 tablet; Refill: 3  -     Lipid Panel; Future; Expected date: 02/29/2024  -     Microalbumin/Creatinine Ratio, Urine; Future; Expected date: 02/29/2024  -     Basic Metabolic Panel; Future; Expected date: 02/29/2024  -     Hemoglobin A1C; Future; Expected date: 02/29/2024    Essential hypertension  -     metoprolol tartrate (LOPRESSOR) 25 MG tablet; Take 0.5 tablets (12.5 mg total) by mouth 2 (two) times daily.  Dispense: 90 tablet; Refill: 0    Lower urinary tract symptoms (LUTS)  -     tamsulosin (FLOMAX) 0.4 mg Cap; Take 2 capsules (0.8 mg total) by mouth every evening. DO NOT CRUSH OR CHEW; SWALLOW WHOLE.  Dispense: 180 capsule; Refill: 3    Nocturia  -     tamsulosin (FLOMAX) 0.4 mg Cap; Take 2 capsules (0.8 mg total) by mouth every evening. DO NOT CRUSH OR CHEW; SWALLOW WHOLE.  Dispense: 180 capsule; Refill: 3    Dyspnea on effort    Panlobular emphysema      No follow-ups on file.        2/29/2024 Chata Christianson  M.D.

## 2024-02-29 ENCOUNTER — OFFICE VISIT (OUTPATIENT)
Dept: FAMILY MEDICINE | Facility: CLINIC | Age: 79
End: 2024-02-29
Payer: MEDICARE

## 2024-02-29 VITALS
HEART RATE: 80 BPM | DIASTOLIC BLOOD PRESSURE: 60 MMHG | HEIGHT: 69 IN | WEIGHT: 202.5 LBS | RESPIRATION RATE: 14 BRPM | OXYGEN SATURATION: 96 % | BODY MASS INDEX: 29.99 KG/M2 | SYSTOLIC BLOOD PRESSURE: 98 MMHG

## 2024-02-29 DIAGNOSIS — I10 ESSENTIAL HYPERTENSION: ICD-10-CM

## 2024-02-29 DIAGNOSIS — E11.22 TYPE 2 DIABETES MELLITUS WITH STAGE 3 CHRONIC KIDNEY DISEASE, WITHOUT LONG-TERM CURRENT USE OF INSULIN, UNSPECIFIED WHETHER STAGE 3A OR 3B CKD: ICD-10-CM

## 2024-02-29 DIAGNOSIS — R06.09 DYSPNEA ON EFFORT: ICD-10-CM

## 2024-02-29 DIAGNOSIS — N18.30 TYPE 2 DIABETES MELLITUS WITH STAGE 3 CHRONIC KIDNEY DISEASE, WITHOUT LONG-TERM CURRENT USE OF INSULIN, UNSPECIFIED WHETHER STAGE 3A OR 3B CKD: ICD-10-CM

## 2024-02-29 DIAGNOSIS — R35.1 NOCTURIA: ICD-10-CM

## 2024-02-29 DIAGNOSIS — J43.1 PANLOBULAR EMPHYSEMA: ICD-10-CM

## 2024-02-29 DIAGNOSIS — R39.9 LOWER URINARY TRACT SYMPTOMS (LUTS): ICD-10-CM

## 2024-02-29 DIAGNOSIS — M70.61 GREATER TROCHANTERIC BURSITIS OF RIGHT HIP: Primary | ICD-10-CM

## 2024-02-29 PROCEDURE — 99999 PR PBB SHADOW E&M-EST. PATIENT-LVL V: CPT | Mod: PBBFAC,,, | Performed by: INTERNAL MEDICINE

## 2024-02-29 PROCEDURE — 99215 OFFICE O/P EST HI 40 MIN: CPT | Mod: PBBFAC,PN | Performed by: INTERNAL MEDICINE

## 2024-02-29 PROCEDURE — 99214 OFFICE O/P EST MOD 30 MIN: CPT | Mod: S$PBB,AQ,, | Performed by: INTERNAL MEDICINE

## 2024-02-29 RX ORDER — TAMSULOSIN HYDROCHLORIDE 0.4 MG/1
0.8 CAPSULE ORAL NIGHTLY
Qty: 180 CAPSULE | Refills: 3 | Status: SHIPPED | OUTPATIENT
Start: 2024-02-29 | End: 2025-02-28

## 2024-02-29 RX ORDER — METOPROLOL TARTRATE 25 MG/1
12.5 TABLET, FILM COATED ORAL 2 TIMES DAILY
Qty: 90 TABLET | Refills: 0 | Status: SHIPPED | OUTPATIENT
Start: 2024-02-29

## 2024-02-29 RX ORDER — TRAMADOL HYDROCHLORIDE 50 MG/1
TABLET ORAL
Qty: 20 TABLET | Refills: 0 | Status: SHIPPED | OUTPATIENT
Start: 2024-02-29

## 2024-02-29 RX ORDER — PREDNISONE 20 MG/1
20 TABLET ORAL DAILY
Qty: 3 TABLET | Refills: 0 | Status: SHIPPED | OUTPATIENT
Start: 2024-02-29

## 2024-02-29 RX ORDER — PIOGLITAZONEHYDROCHLORIDE 15 MG/1
15 TABLET ORAL DAILY
Qty: 90 TABLET | Refills: 3 | Status: SHIPPED | OUTPATIENT
Start: 2024-02-29 | End: 2025-02-28

## 2024-02-29 NOTE — PATIENT INSTRUCTIONS
Due to low BP-monitor it and leave off the doxazocin at bedtime until BP comes up to at least systolic of 130    Take tramadol just at bedtime to help with sleep due to pain

## 2024-03-04 ENCOUNTER — OFFICE VISIT (OUTPATIENT)
Dept: ORTHOPEDICS | Facility: CLINIC | Age: 79
End: 2024-03-04
Payer: MEDICARE

## 2024-03-04 VITALS — HEIGHT: 69 IN | BODY MASS INDEX: 29.92 KG/M2 | WEIGHT: 202 LBS

## 2024-03-04 DIAGNOSIS — M70.61 GREATER TROCHANTERIC BURSITIS OF RIGHT HIP: ICD-10-CM

## 2024-03-04 PROCEDURE — 99203 OFFICE O/P NEW LOW 30 MIN: CPT | Mod: 25,S$GLB,, | Performed by: PHYSICIAN ASSISTANT

## 2024-03-04 PROCEDURE — 20610 DRAIN/INJ JOINT/BURSA W/O US: CPT | Mod: RT,S$GLB,, | Performed by: PHYSICIAN ASSISTANT

## 2024-03-04 RX ORDER — TRIAMCINOLONE ACETONIDE 40 MG/ML
40 INJECTION, SUSPENSION INTRA-ARTICULAR; INTRAMUSCULAR
Status: DISCONTINUED | OUTPATIENT
Start: 2024-03-04 | End: 2024-03-04 | Stop reason: HOSPADM

## 2024-03-04 RX ADMIN — TRIAMCINOLONE ACETONIDE 40 MG: 40 INJECTION, SUSPENSION INTRA-ARTICULAR; INTRAMUSCULAR at 08:03

## 2024-03-04 NOTE — PROGRESS NOTES
[facility]  [address]  [place] [address]  Phone: [phone]   [phone]    Patient's PCP: Carmen Chamorro APRN,FNP-C  Referring Provider: Dr. Chata Christianson    Subjective:      Chief Complaint:   Chief Complaint   Patient presents with    Right Hip - Pain     New patient presents for  right hip pain lasting for 4-6 months. Notes intermittent pain at ranges from 0-9 usually about around a 4-9/10. States he is unable to stand to stand for prolonged periods of time       Past Medical History:   Diagnosis Date    COPD (chronic obstructive pulmonary disease)     Diabetes mellitus, type 2     Gastroenteritis     Seasonal allergic rhinitis due to pollen     Shingles        Past Surgical History:   Procedure Laterality Date    ABDOMINAL AORTOGRAPHY N/A 9/9/2020    Procedure: Aortogram, Abdominal;  Surgeon: Carlitos Jaime MD;  Location: [facility] CATH/EP LAB;  Service: Cardiology;  Laterality: N/A;    APPENDECTOMY      CATHETERIZATION OF BOTH LEFT AND RIGHT HEART N/A 9/9/2020    Procedure: CATHETERIZATION, HEART, BOTH LEFT AND RIGHT;  Surgeon: Carlitos Jaime MD;  Location: [facility] CATH/EP LAB;  Service: Cardiology;  Laterality: N/A;    CYSTOSCOPY N/A 6/10/2020    Procedure: CYSTOSCOPY;  Surgeon: Hunter Guerrero Jr., MD;  Location: [facility] OR;  Service: Urology;  Laterality: N/A;    CYSTOSCOPY N/A 11/16/2022    Procedure: CYSTOSCOPY;  Surgeon: Hunter Guerrero Jr., MD;  Location: Replaced by [facility] OR;  Service: Urology;  Laterality: N/A;    EYE SURGERY Left     TRANSRECTAL ULTRASOUND EXAMINATION N/A 6/10/2020    Procedure: ULTRASOUND, RECTAL APPROACH;  Surgeon: Hunter Guerrero Jr., MD;  Location: Replaced by [facility] OR;  Service: Urology;  Laterality: N/A;    TRANSRECTAL ULTRASOUND EXAMINATION N/A 11/16/2022    Procedure: ULTRASOUND, RECTAL APPROACH;  Surgeon: Hunter Guerrero Jr., MD;  Location: [facility];  Service: Urology;  Laterality: N/A;       Current Outpatient Medications   Medication Sig    aspirin (ECOTRIN) 81 MG EC  tablet Take 1 tablet (81 mg total) by mouth 2 (two) times daily.    clotrimazole-betamethasone 1-0.05% (LOTRISONE) cream Apply topically 2 (two) times daily.    doxazosin (CARDURA) 2 MG tablet Take 1 tablet by mouth in the evening    finasteride (PROSCAR) 5 mg tablet Take 1 tablet (5 mg total) by mouth every evening.    flash glucose scanning reader (FREESTYLE GIANNI 14 DAY READER) Misc 1 each by Misc.(Non-Drug; Combo Route) route once daily.    flash glucose sensor (FREESTYLE GIANNI 14 DAY SENSOR) Kit 1 each by Misc.(Non-Drug; Combo Route) route every 14 (fourteen) days.    glipiZIDE (GLUCOTROL) 10 MG tablet Take 1.5 tablets (15 mg total) by mouth 2 (two) times daily.    lisinopriL 10 MG tablet Take 1 tablet (10 mg total) by mouth once daily.    metFORMIN (GLUCOPHAGE) 1000 MG tablet Take 1 tablet (1,000 mg total) by mouth 2 (two) times daily.    metoprolol tartrate (LOPRESSOR) 25 MG tablet Take 0.5 tablets (12.5 mg total) by mouth 2 (two) times daily.    montelukast (SINGULAIR) 10 mg tablet Take 1 tablet by mouth once daily    pioglitazone (ACTOS) 15 MG tablet Take 1 tablet (15 mg total) by mouth once daily.    pravastatin (PRAVACHOL) 20 MG tablet Take 1 tablet (20 mg total) by mouth every evening.    predniSONE (DELTASONE) 20 MG tablet Take 1 tablet (20 mg total) by mouth once daily.    SITagliptin phosphate (JANUVIA) 100 MG Tab Take 1 tablet (100 mg total) by mouth once daily.    tamsulosin (FLOMAX) 0.4 mg Cap Take 2 capsules (0.8 mg total) by mouth every evening. DO NOT CRUSH OR CHEW; SWALLOW WHOLE.    traMADoL (ULTRAM) 50 mg tablet One at bedtime for discomfort to aid with sleeplessness due to pain     No current facility-administered medications for this visit.       Review of patient's allergies indicates:  No Known Allergies    Family History   Problem Relation Age of Onset    Hypertension Mother     Heart disease Father        Social History     Socioeconomic History    Marital status:    Occupational  History    Occupation: retired    Tobacco Use    Smoking status: Former     Current packs/day: 0.00     Average packs/day: 2.0 packs/day for 45.0 years (90.0 ttl pk-yrs)     Types: Cigarettes     Start date:      Quit date:      Years since quittin.1    Smokeless tobacco: Never   Substance and Sexual Activity    Alcohol use: No    Drug use: No       History of present illness:  Mr. Venegas presents to the office today as a new patient with a chief complaint of right hip pain that has been going on for 4-6 months.  He does not recollect specific injury or trauma.  He localizes the pain to aspect of the right hip.    Review of Systems:    Constitutional: Negative for chills, fever and weight loss.   HENT: Negative for congestion.    Eyes: Negative for discharge and redness.   Respiratory: Negative for cough and shortness of breath.    Cardiovascular: Negative for chest pain.   Gastrointestinal: Negative for nausea and vomiting.   Musculoskeletal: See HPI.   Skin: Negative for rash.   Neurological: Negative for headaches.   Endo/Heme/Allergies: Does not bruise/bleed easily.   Psychiatric/Behavioral: The patient is not nervous/anxious.    All other systems reviewed and are negative.       Objective:      Physical Examination:    Vital Signs:  There were no vitals filed for this visit.    Body mass index is 29.83 kg/m².    This a well-developed, well nourished patient in no acute distress.  They are alert and oriented and cooperative to examination.     Right hip exam: Skin to the right hip is clean dry and intact.  There is no erythema or ecchymosis.  There are no signs or symptoms of infection.  He is neurovascularly intact throughout the right lower extremity.  Right calf is soft and nontender.  He can weightbear as tolerated right lower extremity but does have an antalgic gait.  Well-preserved internal/external rotation of the right hip.  He is tender to palpation over the right greater trochanter.  He  localizes this as his origin discomfort and area maximal tenderness.    Pertinent New Results:        XRAY Report / Interpretation:   Two views taken of the right hip today:  An AP pelvis and frog-leg view.  They reveal no acute fractures or dislocations.  Visualized soft tissues appear unremarkable.  I can appreciate some degenerative discs in the lower lumbar spine however.      Assessment:       1. Greater trochanteric bursitis of right hip      Plan:     Greater trochanteric bursitis of right hip  -     Ambulatory referral/consult to Orthopedics  -     Cancel: X-Ray Hip 1 View Right (with Pelvis when performed)  -     X-Ray Hip 2 or 3 views Right (with Pelvis when performed)  -     Large Joint Aspiration/Injection: R greater trochanteric bursa        Follow up if symptoms worsen or fail to improve.    I injected his right hip greater trochanteric bursa today with 40 mg of Kenalog and lidocaine.  Tolerated this well.  I did explain to him that this can be quite belligerent condition and require multiple injections to resolve.  He will follow up with us if he has recurrence of symptoms at any point in the future.        Ernst Kaur, EMILIANOS, PA-C    This note was created using [employer] voice recognition software that occasionally misinterprets words or phrases.

## 2024-03-04 NOTE — PROCEDURES
Large Joint Aspiration/Injection: R greater trochanteric bursa    Date/Time: 3/4/2024 8:30 AM    Performed by: Ernst Kaur PA-C  Authorized by: Ernst Kaur PA-C    Consent Done?:  Yes (Verbal)  Indications:  Pain  Site marked: the procedure site was marked    Timeout: prior to procedure the correct patient, procedure, and site was verified    Prep: patient was prepped and draped in usual sterile fashion      Local anesthesia used?: Yes    Local anesthetic:  Lidocaine 1% without epinephrine    Details:  Needle Size:  25 G  Ultrasonic Guidance for needle placement?: No    Location:  Hip  Site:  R greater trochanteric bursa  Medications:  40 mg triamcinolone acetonide 40 mg/mL  Patient tolerance:  Patient tolerated the procedure well with no immediate complications

## 2024-03-28 ENCOUNTER — OFFICE VISIT (OUTPATIENT)
Dept: ORTHOPEDICS | Facility: CLINIC | Age: 79
End: 2024-03-28
Payer: MEDICARE

## 2024-03-28 VITALS — WEIGHT: 201.94 LBS | BODY MASS INDEX: 29.91 KG/M2 | HEIGHT: 69 IN

## 2024-03-28 DIAGNOSIS — M70.61 GREATER TROCHANTERIC BURSITIS OF RIGHT HIP: Primary | ICD-10-CM

## 2024-03-28 PROCEDURE — 20610 DRAIN/INJ JOINT/BURSA W/O US: CPT | Mod: RT,S$GLB,, | Performed by: PHYSICIAN ASSISTANT

## 2024-03-28 PROCEDURE — 99213 OFFICE O/P EST LOW 20 MIN: CPT | Mod: 25,S$GLB,, | Performed by: PHYSICIAN ASSISTANT

## 2024-03-28 RX ORDER — TRIAMCINOLONE ACETONIDE 40 MG/ML
40 INJECTION, SUSPENSION INTRA-ARTICULAR; INTRAMUSCULAR
Status: DISCONTINUED | OUTPATIENT
Start: 2024-03-28 | End: 2024-03-28 | Stop reason: HOSPADM

## 2024-03-28 RX ADMIN — TRIAMCINOLONE ACETONIDE 40 MG: 40 INJECTION, SUSPENSION INTRA-ARTICULAR; INTRAMUSCULAR at 07:03

## 2024-03-28 NOTE — PROCEDURES
Large Joint Aspiration/Injection: R greater trochanteric bursa    Date/Time: 3/28/2024 7:45 AM    Performed by: Ernst Kaur PA-C  Authorized by: Ernst Kaur PA-C    Consent Done?:  Yes (Verbal)  Indications:  Pain  Site marked: the procedure site was marked    Timeout: prior to procedure the correct patient, procedure, and site was verified    Prep: patient was prepped and draped in usual sterile fashion      Local anesthesia used?: Yes    Local anesthetic:  Lidocaine 1% without epinephrine    Details:  Needle Size:  25 G  Ultrasonic Guidance for needle placement?: No    Location:  Hip  Site:  R greater trochanteric bursa  Medications:  40 mg triamcinolone acetonide 40 mg/mL  Patient tolerance:  Patient tolerated the procedure well with no immediate complications

## 2024-03-28 NOTE — PROGRESS NOTES
[facility]  [address] [facility] [address]  [place] [address]  Phone: [phone]   [phone]    Patient's PCP: Carmen Chamorro APRN,FNP-C  Referring Provider: No ref. provider found    Subjective:      Chief Complaint:   Chief Complaint   Patient presents with    Right Hip - Pain     F/u right hip pain GTB injection 3.4.24. States had great relief up until the end of last week with pain and discomfort increased at night keep him from sleeping       Past Medical History:   Diagnosis Date    COPD (chronic obstructive pulmonary disease)     Diabetes mellitus, type 2     Gastroenteritis     Seasonal allergic rhinitis due to pollen     Shingles        Past Surgical History:   Procedure Laterality Date    ABDOMINAL AORTOGRAPHY N/A 9/9/2020    Procedure: Aortogram, Abdominal;  Surgeon: Carlitos Jaime MD;  Location: [facility] CATH/EP LAB;  Service: Cardiology;  Laterality: N/A;    APPENDECTOMY      CATHETERIZATION OF BOTH LEFT AND RIGHT HEART N/A 9/9/2020    Procedure: CATHETERIZATION, HEART, BOTH LEFT AND RIGHT;  Surgeon: Carlitos Jaime MD;  Location: [facility] CATH/EP LAB;  Service: Cardiology;  Laterality: N/A;    CYSTOSCOPY N/A 6/10/2020    Procedure: CYSTOSCOPY;  Surgeon: Hunter Guerrero Jr., MD;  Location: [facility] OR;  Service: Urology;  Laterality: N/A;    CYSTOSCOPY N/A 11/16/2022    Procedure: CYSTOSCOPY;  Surgeon: Hunter Guerrero Jr., MD;  Location: [facility] OR;  Service: Urology;  Laterality: N/A;    EYE SURGERY Left     TRANSRECTAL ULTRASOUND EXAMINATION N/A 6/10/2020    Procedure: ULTRASOUND, RECTAL APPROACH;  Surgeon: Hunter Guerrero Jr., MD;  Location: [facility] OR;  Service: Urology;  Laterality: N/A;    TRANSRECTAL ULTRASOUND EXAMINATION N/A 11/16/2022    Procedure: ULTRASOUND, RECTAL APPROACH;  Surgeon: Hunter Guerrero Jr., MD;  Location: [facility] OR;  Service: Urology;  Laterality: N/A;       Current Outpatient Medications   Medication Sig    aspirin (ECOTRIN) 81 MG EC tablet Take 1 tablet (81 mg total) by  mouth 2 (two) times daily.    clotrimazole-betamethasone 1-0.05% (LOTRISONE) cream Apply topically 2 (two) times daily.    doxazosin (CARDURA) 2 MG tablet Take 1 tablet by mouth in the evening    finasteride (PROSCAR) 5 mg tablet Take 1 tablet (5 mg total) by mouth every evening.    flash glucose scanning reader (FREESTYLE GIANNI 14 DAY READER) Misc 1 each by Misc.(Non-Drug; Combo Route) route once daily.    flash glucose sensor (FREESTYLE GIANNI 14 DAY SENSOR) Kit 1 each by Misc.(Non-Drug; Combo Route) route every 14 (fourteen) days.    glipiZIDE (GLUCOTROL) 10 MG tablet Take 1.5 tablets (15 mg total) by mouth 2 (two) times daily.    lisinopriL 10 MG tablet Take 1 tablet (10 mg total) by mouth once daily.    metFORMIN (GLUCOPHAGE) 1000 MG tablet Take 1 tablet (1,000 mg total) by mouth 2 (two) times daily.    metoprolol tartrate (LOPRESSOR) 25 MG tablet Take 0.5 tablets (12.5 mg total) by mouth 2 (two) times daily.    montelukast (SINGULAIR) 10 mg tablet Take 1 tablet by mouth once daily    pioglitazone (ACTOS) 15 MG tablet Take 1 tablet (15 mg total) by mouth once daily.    pravastatin (PRAVACHOL) 20 MG tablet Take 1 tablet (20 mg total) by mouth every evening.    predniSONE (DELTASONE) 20 MG tablet Take 1 tablet (20 mg total) by mouth once daily.    SITagliptin phosphate (JANUVIA) 100 MG Tab Take 1 tablet (100 mg total) by mouth once daily.    tamsulosin (FLOMAX) 0.4 mg Cap Take 2 capsules (0.8 mg total) by mouth every evening. DO NOT CRUSH OR CHEW; SWALLOW WHOLE.    traMADoL (ULTRAM) 50 mg tablet One at bedtime for discomfort to aid with sleeplessness due to pain     No current facility-administered medications for this visit.       Review of patient's allergies indicates:  No Known Allergies    Family History   Problem Relation Age of Onset    Hypertension Mother     Heart disease Father        Social History     Socioeconomic History    Marital status:    Occupational History    Occupation: retired     Tobacco Use    Smoking status: Former     Current packs/day: 0.00     Average packs/day: 2.0 packs/day for 45.0 years (90.0 ttl pk-yrs)     Types: Cigarettes     Start date:      Quit date:      Years since quittin.2    Smokeless tobacco: Never   Substance and Sexual Activity    Alcohol use: No    Drug use: No       History of present illness:  Mr. Venegas comes in today for follow-up for his right hip.  He was last seen just over 3 weeks ago and had a injection for greater trochanteric bursitis.  It provided great relief of his symptoms.  Unfortunately the pain has returned here over the past couple days.  He denies any new injury or trauma.  He is interested in repeat injection today.    Review of Systems:    Constitutional: Negative for chills, fever and weight loss.   HENT: Negative for congestion.    Eyes: Negative for discharge and redness.   Respiratory: Negative for cough and shortness of breath.    Cardiovascular: Negative for chest pain.   Gastrointestinal: Negative for nausea and vomiting.   Musculoskeletal: See HPI.   Skin: Negative for rash.   Neurological: Negative for headaches.   Endo/Heme/Allergies: Does not bruise/bleed easily.   Psychiatric/Behavioral: The patient is not nervous/anxious.    All other systems reviewed and are negative.       Objective:      Physical Examination:    Vital Signs:  There were no vitals filed for this visit.    Body mass index is 29.82 kg/m².    This a well-developed, well nourished patient in no acute distress.  They are alert and oriented and cooperative to examination.     Right hip exam:  His right hip exam is similar to where he was on his initial visit with us just short of a month ago. Skin to the right hip is clean dry and intact.  There is no erythema or ecchymosis.  There are no signs or symptoms of infection.  He is neurovascularly intact throughout the right lower extremity.  Right calf is soft and nontender.  He can weightbear as tolerated  right lower extremity but does have an antalgic gait.  Well-preserved internal/external rotation of the right hip.  He is tender to palpation over the right greater trochanter.  He localizes this as his origin discomfort and area maximal tenderness.    Pertinent New Results:        XRAY Report / Interpretation:   No new radiographs taken on today's clinic visit.      Assessment:       1. Greater trochanteric bursitis of right hip      Plan:     Greater trochanteric bursitis of right hip  -     Large Joint Aspiration/Injection: R greater trochanteric bursa  -     Ambulatory referral/consult to Physical/Occupational Therapy; Future; Expected date: 04/04/2024        Follow up for 6 weeks Right Hip inj 3.28.24 & PT status.    At his request, I injected his right hip greater trochanteric bursa today with 40 mg of Kenalog and lidocaine.  He tolerated this well.  We will add formal physical therapy to the treatment regimen to try to keep this at [place].  Lab follow-up with me in 6 weeks to see how he responds to today's injection and physical therapy.        Ernst Kaur, EMILIANOS, PA-C    This note was created using [employer] voice recognition software that occasionally misinterprets words or phrases.

## 2024-05-07 DIAGNOSIS — I10 ESSENTIAL HYPERTENSION: ICD-10-CM

## 2024-05-07 DIAGNOSIS — E11.22 TYPE 2 DIABETES MELLITUS WITH STAGE 3B CHRONIC KIDNEY DISEASE, WITHOUT LONG-TERM CURRENT USE OF INSULIN: ICD-10-CM

## 2024-05-07 DIAGNOSIS — N18.32 TYPE 2 DIABETES MELLITUS WITH STAGE 3B CHRONIC KIDNEY DISEASE, WITHOUT LONG-TERM CURRENT USE OF INSULIN: ICD-10-CM

## 2024-05-07 DIAGNOSIS — E78.5 DYSLIPIDEMIA: ICD-10-CM

## 2024-05-07 NOTE — TELEPHONE ENCOUNTER
Patient last seen: 02/29/24 - by Dr. Christianson  Scheduled to be seen: Nothing scheduled.   Appointment Cancelled: 04/11/24 - norah/power outage  Medication last filled: 01/11/24    Medication pended

## 2024-05-10 ENCOUNTER — OFFICE VISIT (OUTPATIENT)
Dept: ORTHOPEDICS | Facility: CLINIC | Age: 79
End: 2024-05-10
Payer: MEDICARE

## 2024-05-10 VITALS — WEIGHT: 201.94 LBS | BODY MASS INDEX: 29.91 KG/M2 | HEIGHT: 69 IN

## 2024-05-10 DIAGNOSIS — M70.61 GREATER TROCHANTERIC BURSITIS OF RIGHT HIP: Primary | ICD-10-CM

## 2024-05-10 PROCEDURE — 99214 OFFICE O/P EST MOD 30 MIN: CPT | Mod: S$GLB,,, | Performed by: PHYSICIAN ASSISTANT

## 2024-05-10 NOTE — PROGRESS NOTES
[facility]  [address]  [place] [address]  Phone: [phone]   [phone]    Patient's PCP: Carmen Chamorro APRN,FNP-C  Referring Provider: No ref. provider found    Subjective:      Chief Complaint:   Chief Complaint   Patient presents with    Right Hip - Pain     Patient is here for a f/up on Right hip GTB injec. 3.27.24, states his pain is resolved and doing good.        Past Medical History:   Diagnosis Date    COPD (chronic obstructive pulmonary disease)     Diabetes mellitus, type 2     Gastroenteritis     Seasonal allergic rhinitis due to pollen     Shingles        Past Surgical History:   Procedure Laterality Date    ABDOMINAL AORTOGRAPHY N/A 9/9/2020    Procedure: Aortogram, Abdominal;  Surgeon: Carlitos Jaime MD;  Location: [facility] CATH/EP LAB;  Service: Cardiology;  Laterality: N/A;    APPENDECTOMY      CATHETERIZATION OF BOTH LEFT AND RIGHT HEART N/A 9/9/2020    Procedure: CATHETERIZATION, HEART, BOTH LEFT AND RIGHT;  Surgeon: Carlitos Jaime MD;  Location: [facility] CATH/EP LAB;  Service: Cardiology;  Laterality: N/A;    CYSTOSCOPY N/A 6/10/2020    Procedure: CYSTOSCOPY;  Surgeon: Hunter Guerrero Jr., MD;  Location: [facility] OR;  Service: Urology;  Laterality: N/A;    CYSTOSCOPY N/A 11/16/2022    Procedure: CYSTOSCOPY;  Surgeon: Hunter Guerrero Jr., MD;  Location: [facility] OR;  Service: Urology;  Laterality: N/A;    EYE SURGERY Left     TRANSRECTAL ULTRASOUND EXAMINATION N/A 6/10/2020    Procedure: ULTRASOUND, RECTAL APPROACH;  Surgeon: Hunter Guerrero Jr., MD;  Location: [facility] OR;  Service: Urology;  Laterality: N/A;    TRANSRECTAL ULTRASOUND EXAMINATION N/A 11/16/2022    Procedure: ULTRASOUND, RECTAL APPROACH;  Surgeon: Hunter Guerrero Jr., MD;  Location: [facility] OR;  Service: Urology;  Laterality: N/A;       Current Outpatient Medications   Medication Sig    aspirin (ECOTRIN) 81 MG EC tablet Take 1 tablet (81 mg total) by mouth 2 (two) times daily.    clotrimazole-betamethasone  1-0.05% (LOTRISONE) cream Apply topically 2 (two) times daily.    doxazosin (CARDURA) 2 MG tablet Take 1 tablet by mouth in the evening    finasteride (PROSCAR) 5 mg tablet Take 1 tablet (5 mg total) by mouth every evening.    flash glucose scanning reader (FREESTYLE GIANNI 14 DAY READER) Misc 1 each by Misc.(Non-Drug; Combo Route) route once daily.    flash glucose sensor (FREESTYLE GIANNI 14 DAY SENSOR) Kit 1 each by Misc.(Non-Drug; Combo Route) route every 14 (fourteen) days.    glipiZIDE (GLUCOTROL) 10 MG tablet Take 1.5 tablets (15 mg total) by mouth 2 (two) times daily.    lisinopriL 10 MG tablet Take 1 tablet (10 mg total) by mouth once daily.    metFORMIN (GLUCOPHAGE) 1000 MG tablet Take 1 tablet (1,000 mg total) by mouth 2 (two) times daily.    metoprolol tartrate (LOPRESSOR) 25 MG tablet Take 0.5 tablets (12.5 mg total) by mouth 2 (two) times daily.    montelukast (SINGULAIR) 10 mg tablet Take 1 tablet by mouth once daily    pioglitazone (ACTOS) 15 MG tablet Take 1 tablet (15 mg total) by mouth once daily.    pravastatin (PRAVACHOL) 20 MG tablet Take 1 tablet (20 mg total) by mouth every evening.    predniSONE (DELTASONE) 20 MG tablet Take 1 tablet (20 mg total) by mouth once daily.    SITagliptin phosphate (JANUVIA) 100 MG Tab Take 1 tablet (100 mg total) by mouth once daily.    tamsulosin (FLOMAX) 0.4 mg Cap Take 2 capsules (0.8 mg total) by mouth every evening. DO NOT CRUSH OR CHEW; SWALLOW WHOLE.    traMADoL (ULTRAM) 50 mg tablet One at bedtime for discomfort to aid with sleeplessness due to pain     No current facility-administered medications for this visit.       Review of patient's allergies indicates:  No Known Allergies    Family History   Problem Relation Name Age of Onset    Hypertension Mother      Heart disease Father         Social History     Socioeconomic History    Marital status:    Occupational History    Occupation: retired    Tobacco Use    Smoking status: Former     Current  packs/day: 0.00     Average packs/day: 2.0 packs/day for 45.0 years (90.0 ttl pk-yrs)     Types: Cigarettes     Start date:      Quit date:      Years since quittin.3    Smokeless tobacco: Never   Substance and Sexual Activity    Alcohol use: No    Drug use: No       Prior to meeting with the patient I reviewed the medical chart in [facility]. This included reviewing the previous progress notes from our office, review of the patient's last appointment with their primary care provider, review of any visits to the emergency room, and review of any pain management appointments or procedures.    History of present illness: 78 y.o. male, returns to clinic today to follow-up on his right hip.  We treated him for trochanteric bursitis during 2 visits in March.  We ordered physical therapy but he has not attend any physical therapy because his pain resolved.  He has no complaints today.      Review of Systems:    Constitutional: Negative for chills, fever and weight loss.   HENT: Negative for congestion.    Eyes: Negative for discharge and redness.   Respiratory: Negative for cough and shortness of breath.    Cardiovascular: Negative for chest pain.   Gastrointestinal: Negative for nausea and vomiting.   Musculoskeletal: See HPI.   Skin: Negative for rash.   Neurological: Negative for headaches.   Endo/Heme/Allergies: Does not bruise/bleed easily.   Psychiatric/Behavioral: The patient is not nervous/anxious.    All other systems reviewed and are negative.       Objective:      Physical Examination:    Vital Signs:  There were no vitals filed for this visit.    Body mass index is 29.82 kg/m².    This a well-developed, well nourished patient in no acute distress.  They are alert and oriented and cooperative to examination.     Examination of the right hip, patient has no pain, normal range of motion.  No pain in the groin.  Normal range motion in the knee ankle and foot..      Pertinent New Results:       XRAY Report /  Interpretation:   .          Assessment:       1. Greater trochanteric bursitis of right hip      Plan:     Greater trochanteric bursitis of right hip        Follow up if symptoms worsen or fail to improve.    Trochanteric bursitis, responded to a repeat steroid injection at his visit about 6 weeks ago.  He is doing well, he has no pain.  He will follow up with us on an as-needed basis.      [unfilled]  ARJUN Lozano PA-C    This note was created using [employer] voice recognition software that occasionally misinterprets words or phrases.

## 2024-05-16 RX ORDER — LISINOPRIL 10 MG/1
10 TABLET ORAL
Qty: 90 TABLET | Refills: 0 | Status: SHIPPED | OUTPATIENT
Start: 2024-05-16

## 2024-05-16 RX ORDER — PRAVASTATIN SODIUM 20 MG/1
20 TABLET ORAL NIGHTLY
Qty: 90 TABLET | Refills: 0 | Status: SHIPPED | OUTPATIENT
Start: 2024-05-16

## 2024-05-16 RX ORDER — SITAGLIPTIN 100 MG/1
100 TABLET, FILM COATED ORAL
Qty: 90 TABLET | Refills: 0 | Status: SHIPPED | OUTPATIENT
Start: 2024-05-16

## 2024-05-16 RX ORDER — MONTELUKAST SODIUM 10 MG/1
10 TABLET ORAL
Qty: 90 TABLET | Refills: 0 | Status: SHIPPED | OUTPATIENT
Start: 2024-05-16

## 2024-11-19 ENCOUNTER — OFFICE VISIT (OUTPATIENT)
Dept: PULMONOLOGY | Facility: CLINIC | Age: 79
End: 2024-11-19
Payer: MEDICARE

## 2024-11-19 VITALS
SYSTOLIC BLOOD PRESSURE: 110 MMHG | WEIGHT: 204.31 LBS | HEART RATE: 87 BPM | BODY MASS INDEX: 29.25 KG/M2 | HEIGHT: 70 IN | OXYGEN SATURATION: 97 % | DIASTOLIC BLOOD PRESSURE: 68 MMHG

## 2024-11-19 DIAGNOSIS — Z78.9 NON-SMOKER: ICD-10-CM

## 2024-11-19 DIAGNOSIS — R93.89 ABNORMAL CT OF THE CHEST: ICD-10-CM

## 2024-11-19 DIAGNOSIS — J44.9 CHRONIC OBSTRUCTIVE PULMONARY DISEASE, UNSPECIFIED COPD TYPE: Primary | ICD-10-CM

## 2024-11-19 PROCEDURE — 99213 OFFICE O/P EST LOW 20 MIN: CPT | Mod: S$GLB,,, | Performed by: INTERNAL MEDICINE

## 2024-11-19 NOTE — PROGRESS NOTES
Office Visit Note *    Patient Name: Theo Chen Jr.  MRN: [id number]  : 1945      Reason for visit: COPD    HPI:     2020 - Referred here for evaluation of COPD.  Had spirometry done at Dr Donovan's office showing mild/moderate obstruction improved by dilators.  He started on TRELEGY about 3 weeks ago and has noted improvement in his symptoms.  Also has albuterol inhaler but really have used it since starting TRELEGY.  Had nasal procedure done at Dr Donovan's office and has noted improvement in his cough.  Sputum is usually clear to milky white but this has also improved with his recent treatments.  No h/o hemoptysis.  Had recent CT chest (see report below).  He still has some SOB but also better on present meds.  He is overweight and does not exercise regularly.  He has no h/o sleep apnea, denies any significant snoring, no reported history apneas.  Denies excessive somnolence but does have decreased activity after noon.    10/22/2020 - Here for follow up,  Pt is currently stable on present medications with no recent increases in their symptoms or use of rescue medications.  Since our last visit there have been no hospitalizations or ER visits for their respiratory issues and there does not seem to be anything to suggest unrecognized exacerbations.  I have reviewed the medical regimen and re-educated the pt on the role of rescue and controlling medications.  Inhaler technique and understanding seems adequate.  The patient reports no issues with any of there medications for their COPD.  Refills will be taken care of as needed.  All questions answered.  Does get occasional thrush with TRELEGY.  Patient has no known corona virus exposures and has been practicing social distancing.  We have discussed the virus and precautions and all questions have been answered.  Reviewed PFT with pt.    2021 - Here for follow, Pt is currently stable on present medications with no recent increases in their  symptoms or use of rescue medications.  Since our last visit there have been no hospitalizations or ER visits for their respiratory issues and there does not seem to be anything to suggest unrecognized exacerbations.  I have reviewed the medical regimen and re-educated the pt on the role of rescue and controlling medications.  Inhaler technique and understanding seems adequate.  The patient reports no issues with any of there medications for their COPD.  Refills will be taken care of as needed.  All questions answered.  Does have exertional dyspnea and we discussed diet and exercise.  Patient has no known corona virus exposures and has been practicing social distancing.  We have discussed the virus and precautions and all questions have been answered.    10/25/2021 - Here for follow up, Pt is currently stable on present medications with no recent increases in their symptoms or use of rescue medications.  Since our last visit there have been no hospitalizations or ER visits for their respiratory issues and there does not seem to be anything to suggest unrecognized exacerbations.  I have reviewed the medical regimen and re-educated the pt on the role of rescue and controlling medications.  Inhaler technique and understanding seems adequate.  The patient reports no issues with any of there medications for their COPD.  Refills will be taken care of as needed.  All questions answered.  He is not very active (we discussed this).  Patient has no known corona virus exposures and has been practicing social distancing.  We have discussed the virus and precautions and all questions have been answered.  Has had Covid vaccine and booster.    5/24/2022 - Here for follow, Pt is currently stable on present medications with no recent increases in their symptoms or use of rescue medications.  Since our last visit there have been no hospitalizations or ER visits for their respiratory issues and there does not seem to be anything to  suggest unrecognized exacerbations.  I have reviewed the medical regimen and re-educated the pt on the role of rescue and controlling medications.  Inhaler technique and understanding seems adequate.  The patient reports no issues with any of there medications for their COPD.  Refills will be taken care of as needed.  All questions answered.  Feels good.  Patient has no known corona virus exposures and has been practicing social distancing.  We have discussed the virus and precautions and all questions have been answered.  Has had Covid vaccine and booster.We discussed the second booster.    11/14/2022 - Here for follow up, Pt is currently stable on present medications with no recent increases in their symptoms or use of rescue medications.  Since our last visit there have been no hospitalizations or ER visits for their respiratory issues and there does not seem to be anything to suggest unrecognized exacerbations.  I have reviewed the medical regimen and re-educated the pt on the role of rescue and controlling medications.  Inhaler technique and understanding seems adequate.  The patient reports no issues with any of there medications for their COPD.  Refills will be taken care of as needed.  All questions answered.  He is to have prostate procedure this week.  Patient has no known corona virus exposures and has been practicing social distancing.  We have discussed the virus and precautions and all questions have been answered.  He denies any hospitalizations or new problems.    11/14/2023 - Here for follow up, Pt is currently stable on present medications with no recent increases in their symptoms or use of rescue medications.  Since our last visit there have been no hospitalizations or ER visits for their respiratory issues and there does not seem to be anything to suggest unrecognized exacerbations.  I have reviewed the medical regimen and re-educated the pt on the role of rescue and controlling medications.   Inhaler technique and understanding seems adequate.  The patient reports no issues with any of there medications for their COPD.  Refills will be taken care of as needed.  All questions answered.  Had mild COPD by prior PFT.  He quit smoking  15-20 years ago.  We discussed vaccines.  Has had a pretty good year, no hospitalization, 1 Urgent Care visit for arthritis issues.    11/19/2024 - Here for follow up visit.  Patient is currently stable on present medications with no recent increases in their symptoms or use of rescue medications.  Since our last visit there have been no hospitalizations or ER visits for their respiratory issues and there does not seem to be anything to suggest unrecognized exacerbations.  I have reviewed the medical regimen and re-educated the pt on the role of rescue and controlling medications.  Inhaler technique and understanding seems adequate.  The patient reports no issues with any of there medications for their COPD.  Refills will be taken care of as needed.  All questions answered.  We have discussed potential future therapies or options as appropriate.   Have discussed vaccines with patient including but not limited to the influenza vaccine, pneumonia vaccine, RSV vaccine, Shingles vaccine, tetanus vaccine and Covid vaccine.  We discussed the current recommendations and the patient's current status.  I have recommended vaccines as appropriate for the individual patient.  All questions have been answered.  He has had some issues with shingles.  He reports no ER or hospital stays          COPD Flowsheet    GOLD A    Last PFT - 10/2020  FEV1- 85 % DLCO - 41 %     + ICS/LABA/LAMA    + prn ALBUTEROL    mMRC -  0 - SOB with strenuous exercise   - + 1 - SOB level ground, slight hill   -  2 - SOB walk slower or stop for breath level ground   -  3 - SOB at 100 yards or after few minutes   -  4 - SOB in house, dressing    Referral to PULMONARY REHABILITATION -  NO    Tested for  alpha-1-antitrypsin - NO  Result - na    Past Medical History    Past Medical History:   Diagnosis Date    COPD (chronic obstructive pulmonary disease)     Diabetes mellitus, type 2     Gastroenteritis     Seasonal allergic rhinitis due to pollen     Shingles        Past Surgical History    Past Surgical History:   Procedure Laterality Date    ABDOMINAL AORTOGRAPHY N/A 9/9/2020    Procedure: Aortogram, Abdominal;  Surgeon: Carlitos Jaime MD;  Location: [facility] CATH/EP LAB;  Service: Cardiology;  Laterality: N/A;    APPENDECTOMY      CATHETERIZATION OF BOTH LEFT AND RIGHT HEART N/A 9/9/2020    Procedure: CATHETERIZATION, HEART, BOTH LEFT AND RIGHT;  Surgeon: Carlitos Jaime MD;  Location: [facility] CATH/EP LAB;  Service: Cardiology;  Laterality: N/A;    CYSTOSCOPY N/A 6/10/2020    Procedure: CYSTOSCOPY;  Surgeon: Hunter Guerrero Jr., MD;  Location: [facility] OR;  Service: Urology;  Laterality: N/A;    CYSTOSCOPY N/A 11/16/2022    Procedure: CYSTOSCOPY;  Surgeon: Hunter Guerrero Jr., MD;  Location: [facility] OR;  Service: Urology;  Laterality: N/A;    EYE SURGERY Left     TRANSRECTAL ULTRASOUND EXAMINATION N/A 6/10/2020    Procedure: ULTRASOUND, RECTAL APPROACH;  Surgeon: Hunter Guerrero Jr., MD;  Location: [facility] OR;  Service: Urology;  Laterality: N/A;    TRANSRECTAL ULTRASOUND EXAMINATION N/A 11/16/2022    Procedure: ULTRASOUND, RECTAL APPROACH;  Surgeon: Hunter Guerrero Jr., MD;  Location: [facility];  Service: Urology;  Laterality: N/A;       Medications      Current Outpatient Medications:     aspirin (ECOTRIN) 81 MG EC tablet, Take 1 tablet (81 mg total) by mouth 2 (two) times daily., Disp: , Rfl:     doxazosin (CARDURA) 2 MG tablet, Take 1 tablet by mouth in the evening, Disp: 90 tablet, Rfl: 0    finasteride (PROSCAR) 5 mg tablet, Take 1 tablet (5 mg total) by mouth every evening., Disp: 90 tablet, Rfl: 3    glipiZIDE (GLUCOTROL) 10 MG tablet, Take 1.5 tablets (15 mg total) by mouth 2 (two) times daily., Disp: 270 tablet, Rfl: 0     JANUVIA 100 mg Tab, Take 1 tablet by mouth once daily, Disp: 90 tablet, Rfl: 0    lisinopriL 10 MG tablet, Take 1 tablet by mouth once daily, Disp: 90 tablet, Rfl: 0    metFORMIN (GLUCOPHAGE) 1000 MG tablet, Take 1 tablet (1,000 mg total) by mouth 2 (two) times daily., Disp: 180 tablet, Rfl: 0    metoprolol tartrate (LOPRESSOR) 25 MG tablet, Take 0.5 tablets (12.5 mg total) by mouth 2 (two) times daily., Disp: 90 tablet, Rfl: 0    montelukast (SINGULAIR) 10 mg tablet, Take 1 tablet by mouth once daily, Disp: 90 tablet, Rfl: 0    pravastatin (PRAVACHOL) 20 MG tablet, TAKE 1 TABLET BY MOUTH ONCE DAILY IN THE EVENING, Disp: 90 tablet, Rfl: 0    tamsulosin (FLOMAX) 0.4 mg Cap, Take 2 capsules (0.8 mg total) by mouth every evening. DO NOT CRUSH OR CHEW; SWALLOW WHOLE., Disp: 180 capsule, Rfl: 3    flash glucose scanning reader (FREESTYLE GIANNI 14 DAY READER) Misc, 1 each by Misc.(Non-Drug; Combo Route) route once daily., Disp: 1 each, Rfl: 0    flash glucose sensor (FREESTYLE GIANNI 14 DAY SENSOR) Kit, 1 each by Misc.(Non-Drug; Combo Route) route every 14 (fourteen) days., Disp: 2 kit, Rfl: 2    pioglitazone (ACTOS) 15 MG tablet, Take 1 tablet (15 mg total) by mouth once daily., Disp: 90 tablet, Rfl: 3    predniSONE (DELTASONE) 20 MG tablet, Take 1 tablet (20 mg total) by mouth once daily., Disp: 3 tablet, Rfl: 0    traMADoL (ULTRAM) 50 mg tablet, One at bedtime for discomfort to aid with sleeplessness due to pain (Patient not taking: Reported on 2024), Disp: 20 tablet, Rfl: 0    Allergies    Review of patient's allergies indicates:  No Known Allergies    SocHx    Social History     Tobacco Use   Smoking Status Former    Current packs/day: 0.00    Average packs/day: 2.0 packs/day for 45.0 years (90.0 ttl pk-yrs)    Types: Cigarettes    Start date: 1960    Quit date:     Years since quittin.8   Smokeless Tobacco Never       Social History     Substance and Sexual Activity   Alcohol Use No       Drug Use -  "no  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   HENT:  Negative for congestion.    Eyes:  Negative for pain.   Respiratory:  Positive for cough and shortness of breath. Negative for hemoptysis, sputum production, wheezing and stridor.    Cardiovascular:  Negative for chest pain, palpitations, orthopnea, claudication, leg swelling and PND.   Gastrointestinal:  Negative for abdominal pain, blood in stool, constipation, diarrhea, heartburn, nausea and vomiting.   Genitourinary:  Negative for dysuria, frequency, hematuria and urgency.   Musculoskeletal:  Negative for falls and myalgias.   Neurological:  Negative for dizziness, tingling, tremors, sensory change, speech change, focal weakness, seizures, loss of consciousness, weakness and headaches.   Endo/Heme/Allergies:  Negative for environmental allergies.   Psychiatric/Behavioral:  Negative for depression, substance abuse and suicidal ideas. The patient is not nervous/anxious.        Physical Exam    Vitals:    11/19/24 0857   BP: 110/68   BP Location: Left arm   Patient Position: Sitting   Pulse: 87   SpO2: 97%   Weight: 92.7 kg (204 lb 4.8 oz)   Height: 5' 10" (1.778 m)       Physical Exam  Vitals and nursing note reviewed.   Constitutional:       General: He is not in acute distress.     Appearance: Normal appearance. He is well-developed. He is obese. He is not ill-appearing, toxic-appearing or diaphoretic.   HENT:      Head: Normocephalic and atraumatic.      Right Ear: External ear normal.      Left Ear: External ear normal.      Nose: Nose normal. No congestion or rhinorrhea.   Eyes:      General: No scleral icterus.        Right eye: No " discharge.         Left eye: No discharge.      Extraocular Movements: Extraocular movements intact.      Conjunctiva/sclera: Conjunctivae normal.      Pupils: Pupils are equal, round, and reactive to light.   Neck:      Thyroid: No thyromegaly.      Vascular: No JVD.      Trachea: No tracheal deviation.   Cardiovascular:      Rate and Rhythm: Normal rate and regular rhythm.      Pulses: Normal pulses.      Heart sounds: Normal heart sounds. No murmur heard.     No friction rub. No gallop.   Pulmonary:      Effort: Pulmonary effort is normal. No respiratory distress.      Breath sounds: Normal breath sounds. No stridor. No wheezing, rhonchi or rales.   Chest:      Chest wall: No tenderness.   Abdominal:      General: Bowel sounds are normal. There is no distension.      Palpations: Abdomen is soft.      Tenderness: There is no abdominal tenderness. There is no guarding.   Musculoskeletal:         General: No tenderness. Normal range of motion.      Cervical back: Normal range of motion and neck supple.      Right lower leg: No edema.      Left lower leg: No edema.   Lymphadenopathy:      Cervical: No cervical adenopathy.   Skin:     General: Skin is warm and dry.      Coloration: Skin is not jaundiced.      Findings: No bruising.   Neurological:      General: No focal deficit present.      Mental Status: He is alert and oriented to person, place, and time. Mental status is at baseline.      Cranial Nerves: No cranial nerve deficit.      Deep Tendon Reflexes: Reflexes are normal and symmetric.   Psychiatric:         Mood and Affect: Mood normal.         Behavior: Behavior normal.         Thought Content: Thought content normal.         Judgment: Judgment normal.         Labs    Lab Results   Component Value Date    WBC 9.9 01/11/2024    HGB 15.3 01/11/2024    HCT 46.9 01/11/2024     01/11/2024       Sodium   Date Value Ref Range Status   01/11/2024 139 134 - 144 mmol/L Final     Potassium   Date Value Ref  Range Status   01/11/2024 4.6 3.5 - 5.2 mmol/L Final     Chloride   Date Value Ref Range Status   01/11/2024 101 96 - 106 mmol/L Final     CO2   Date Value Ref Range Status   01/11/2024 21 20 - 29 mmol/L Final     Glucose   Date Value Ref Range Status   01/11/2024 148 (H) 70 - 99 mg/dL Final     BUN   Date Value Ref Range Status   01/11/2024 14 8 - 27 mg/dL Final     Creatinine   Date Value Ref Range Status   01/11/2024 1.50 (H) 0.76 - 1.27 mg/dL Final     Calcium   Date Value Ref Range Status   01/11/2024 9.4 8.6 - 10.2 mg/dL Final     Total Protein   Date Value Ref Range Status   06/30/2021 6.9 6.0 - 8.4 g/dL Final     Albumin   Date Value Ref Range Status   07/07/2023 4.5 3.7 - 4.7 g/dL Final     Comment:                    **Effective July 10, 2023 Albumin reference interval**                   will be changing to:                              Age                  Male          Female                             0 -   7 days       3.6 - 4.9      3.6 - 4.9                             8 -  30 days       3.5 - 4.6      3.5 - 4.6                             1 -   6 months     3.7 - 4.8      3.7 - 4.8                      7 months -   2 years      4.0 - 5.0      4.0 - 5.0                             3 -   5 years      4.1 - 5.0      4.1 - 5.0                             6 -  12 years      4.2 - 5.0      4.2 - 5.0                            13 -  30 years      4.3 - 5.2      4.0 - 5.0                            31 -  50 years      4.1 - 5.1      3.9 - 4.9                            51 -  60 years      3.8 - 4.9      3.8 - 4.9                            61 -  70 years      3.9 - 4.9      3.9 - 4.9                            71 -  80 years      3.8 - 4.8      3.8 - 4.8                            81 -  89 years      3.7 - 4.7      3.7 - 4.7                            90 - 199 years      3.6 - 4.6      3.6 - 4.6     06/30/2021 4.1 3.5 - 5.2 g/dL Final     Total Bilirubin   Date Value Ref Range Status   07/07/2023 0.3 0.0  - 1.2 mg/dL Final     Alkaline Phosphatase   Date Value Ref Range Status   06/30/2021 51 (L) 55 - 135 U/L Final     AST   Date Value Ref Range Status   07/07/2023 18 0 - 40 IU/L Final     ALT   Date Value Ref Range Status   07/07/2023 19 0 - 44 IU/L Final     Anion Gap   Date Value Ref Range Status   06/30/2021 12 8 - 16 mmol/L Final       Xrays    CT chest (7/10/20)   1. 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 This flattens the  superior vena cava and exerts mass effect on the left atrium and  superior left pulmonary vein. Cardiology or cardiothoracic surgery  consultation is recommended.  2. There is calcification of the left main/descending coronary artery.  3. There is right worse than left panlobular emphysema. Consultation  with pulmonary medicine is advised this has not occurred previously.       Impression/Plan    Problem List Items Addressed This Visit          Pulmonary    COPD (chronic obstructive pulmonary disease) - Primary     Mild COPD by prior PFT  On no meds  RTC 1 year            Other    Non-smoker     Quit about 2005         Abnormal CT of the chest     Aware of prior findings  No new for follow up CT at this time                    Raffaele Carrillo MD

## 2024-12-07 NOTE — PROGRESS NOTES
"  SUBJECTIVE:    Patient ID: Theo Chen Jr. is a 78 y.o. male.    Chief Complaint: Hip Pain (And top of thigh, more of an ache . Hurts more lying down than sitting up)    Hip Pain   Pertinent negatives include no numbness.       Patient complains of two weeks of discomfort in the right lateral hip or right anterior thigh and sometimes he feels both at the same time, coming from the lateral hip-He went to a medical clinic who gave him an US to rule out DVT which was neg and gave him flexeril and meloxicam which were " useless" the pain is worse in the supine position and his sleep is interrupted due to same-he adds that he sleeps in a recliner most of the time-he denies pain in the low back, although he has some chronic low back pain but this is not related to his knowledge or sensation-pain is limited to the thigh and does not go past the mid right thigh anterior aspect.    Refill on 06/19/2023   Component Date Value Ref Range Status    WBC 07/07/2023 8.6  3.4 - 10.8 x10E3/uL Final    RBC 07/07/2023 4.98  4.14 - 5.80 x10E6/uL Final    Hemoglobin 07/07/2023 14.9  13.0 - 17.7 g/dL Final    Hematocrit 07/07/2023 45.3  37.5 - 51.0 % Final    MCV 07/07/2023 91  79 - 97 fL Final    MCH 07/07/2023 29.9  26.6 - 33.0 pg Final    MCHC 07/07/2023 32.9  31.5 - 35.7 g/dL Final    RDW 07/07/2023 13.3  11.6 - 15.4 % Final    Platelets 07/07/2023 188  150 - 450 x10E3/uL Final    Neutrophils 07/07/2023 72  Not Estab. % Final    Lymphs 07/07/2023 13  Not Estab. % Final    Monocytes 07/07/2023 8  Not Estab. % Final    Eos 07/07/2023 6  Not Estab. % Final    Basos 07/07/2023 1  Not Estab. % Final    Neutrophils (Absolute) 07/07/2023 6.2  1.4 - 7.0 x10E3/uL Final    Lymphs (Absolute) 07/07/2023 1.1  0.7 - 3.1 x10E3/uL Final    Monocytes(Absolute) 07/07/2023 0.7  0.1 - 0.9 x10E3/uL Final    Eos (Absolute) 07/07/2023 0.5 (H)  0.0 - 0.4 x10E3/uL Final    Baso (Absolute) 07/07/2023 0.1  0.0 - 0.2 x10E3/uL Final    Immature Granulocytes " 07/07/2023 0  Not Estab. % Final    Immature Grans (Abs) 07/07/2023 0.0  0.0 - 0.1 x10E3/uL Final    Glucose 07/07/2023 198 (H)  70 - 99 mg/dL Final    BUN 07/07/2023 17  8 - 27 mg/dL Final    Creatinine 07/07/2023 1.42 (H)  0.76 - 1.27 mg/dL Final    eGFR 07/07/2023 50 (L)  >59 mL/min/1.73 Final    BUN/Creatinine Ratio 07/07/2023 12  10 - 24 Final    Sodium 07/07/2023 137  134 - 144 mmol/L Final    Potassium 07/07/2023 4.9  3.5 - 5.2 mmol/L Final    Chloride 07/07/2023 99  96 - 106 mmol/L Final    CO2 07/07/2023 24  20 - 29 mmol/L Final    Calcium 07/07/2023 9.8  8.6 - 10.2 mg/dL Final    Protein, Total 07/07/2023 6.5  6.0 - 8.5 g/dL Final    Albumin 07/07/2023 4.5  3.7 - 4.7 g/dL Final    Globulin, Total 07/07/2023 2.0  1.5 - 4.5 g/dL Final    Albumin/Globulin Ratio 07/07/2023 2.3 (H)  1.2 - 2.2 Final    Total Bilirubin 07/07/2023 0.3  0.0 - 1.2 mg/dL Final    Alkaline Phosphatase 07/07/2023 91  44 - 121 IU/L Final    AST 07/07/2023 18  0 - 40 IU/L Final    ALT 07/07/2023 19  0 - 44 IU/L Final    Cholesterol 07/07/2023 148  100 - 199 mg/dL Final    Triglycerides 07/07/2023 115  0 - 149 mg/dL Final    HDL 07/07/2023 46  >39 mg/dL Final    VLDL Cholesterol Yung 07/07/2023 21  5 - 40 mg/dL Final    LDL Calculated 07/07/2023 81  0 - 99 mg/dL Final    TSH 07/07/2023 1.890  0.450 - 4.500 uIU/mL Final    Creatinine, Urine 07/07/2023 51.6  Not Estab. mg/dL Final    Microalb, Ur 07/07/2023 17.9  Not Estab. ug/mL Final    Microalb/Crt. Ratio 07/07/2023 35 (H)  0 - 29 mg/g creat Final    Hemoglobin A1c 07/07/2023 7.5 (H)  4.8 - 5.6 % Final   Lab Visit on 02/28/2023   Component Date Value Ref Range Status    TSH 02/28/2023 1.390  0.340 - 5.600 uIU/mL Final   Office Visit on 01/05/2023   Component Date Value Ref Range Status    Hemoglobin A1C, POC 01/05/2023 7.2  % Final   Admission on 11/16/2022, Discharged on 11/16/2022   Component Date Value Ref Range Status    Urine Culture, Routine 11/16/2022 No growth   Final       Past  Medical History:   Diagnosis Date    COPD (chronic obstructive pulmonary disease)     Diabetes mellitus, type 2     Gastroenteritis     Seasonal allergic rhinitis due to pollen     Shingles      Past Surgical History:   Procedure Laterality Date    ABDOMINAL AORTOGRAPHY N/A 9/9/2020    Procedure: Aortogram, Abdominal;  Surgeon: Carlitos Jaime MD;  Location: [facility] CATH/EP LAB;  Service: Cardiology;  Laterality: N/A;    APPENDECTOMY      CATHETERIZATION OF BOTH LEFT AND RIGHT HEART N/A 9/9/2020    Procedure: CATHETERIZATION, HEART, BOTH LEFT AND RIGHT;  Surgeon: Carlitos Jaime MD;  Location: [facility] CATH/EP LAB;  Service: Cardiology;  Laterality: N/A;    CYSTOSCOPY N/A 6/10/2020    Procedure: CYSTOSCOPY;  Surgeon: Hunter Guerrero Jr., MD;  Location: [facility] OR;  Service: Urology;  Laterality: N/A;    CYSTOSCOPY N/A 11/16/2022    Procedure: CYSTOSCOPY;  Surgeon: Hunter Guerrero Jr., MD;  Location: [facility];  Service: Urology;  Laterality: N/A;    EYE SURGERY Left     TRANSRECTAL ULTRASOUND EXAMINATION N/A 6/10/2020    Procedure: ULTRASOUND, RECTAL APPROACH;  Surgeon: Hunter Guerrero Jr., MD;  Location: [facility] of [facility] OR;  Service: Urology;  Laterality: N/A;    TRANSRECTAL ULTRASOUND EXAMINATION N/A 11/16/2022    Procedure: ULTRASOUND, RECTAL APPROACH;  Surgeon: Hunter Guerrero Jr., MD;  Location: [facility];  Service: Urology;  Laterality: N/A;     Family History   Problem Relation Age of Onset    Hypertension Mother     Heart disease Father        Marital Status:   Alcohol History:  reports no history of alcohol use.  Tobacco History:  reports that he quit smoking about 18 years ago. His smoking use included cigarettes. He started smoking about 63 years ago. He has a 90.0 pack-year smoking history. He has never used smokeless tobacco.  Drug History:  reports no history of drug use.    Review of patient's allergies indicates:  No Known Allergies    Current Outpatient Medications:     aspirin (ECOTRIN) 81 MG EC tablet, Take 1 tablet  (81 mg total) by mouth 2 (two) times daily., Disp: , Rfl:     cyclobenzaprine (FLEXERIL) 5 MG tablet, Take 5 mg by mouth nightly as needed., Disp: , Rfl:     doxazosin (CARDURA) 2 MG tablet, Take 1 tablet (2 mg total) by mouth every evening., Disp: 90 tablet, Rfl: 0    finasteride (PROSCAR) 5 mg tablet, Take 1 tablet (5 mg total) by mouth every evening., Disp: 90 tablet, Rfl: 0    flash glucose scanning reader (FREESTYLE GIANNI 14 DAY READER) Misc, 1 each by Misc.(Non-Drug; Combo Route) route once daily., Disp: 1 each, Rfl: 0    flash glucose sensor (FREESTYLE GIANNI 14 DAY SENSOR) Kit, 1 each by Misc.(Non-Drug; Combo Route) route every 14 (fourteen) days., Disp: 2 kit, Rfl: 2    glipiZIDE (GLUCOTROL) 10 MG tablet, Take 1 tablet (10 mg total) by mouth 2 (two) times daily., Disp: 180 tablet, Rfl: 0    lisinopriL 10 MG tablet, Take 1 tablet (10 mg total) by mouth once daily., Disp: 90 tablet, Rfl: 0    meloxicam (MOBIC) 15 MG tablet, Take 15 mg by mouth daily as needed., Disp: , Rfl:     metFORMIN (GLUCOPHAGE) 1000 MG tablet, Take 1 tablet (1,000 mg total) by mouth 2 (two) times daily., Disp: 180 tablet, Rfl: 0    metoclopramide HCl (REGLAN) 10 MG tablet, Take 1 tablet (10 mg total) by mouth 2 (two) times daily., Disp: 60 tablet, Rfl: 0    metoprolol tartrate (LOPRESSOR) 25 MG tablet, Take 0.5 tablets (12.5 mg total) by mouth 2 (two) times daily., Disp: 90 tablet, Rfl: 0    montelukast (SINGULAIR) 10 mg tablet, Take 1 tablet (10 mg total) by mouth once daily., Disp: 90 tablet, Rfl: 0    pravastatin (PRAVACHOL) 20 MG tablet, Take 1 tablet (20 mg total) by mouth every evening., Disp: 90 tablet, Rfl: 0    SITagliptin phosphate (JANUVIA) 100 MG Tab, Take 1 tablet (100 mg total) by mouth once daily., Disp: 90 tablet, Rfl: 0    tamsulosin (FLOMAX) 0.4 mg Cap, Take 2 capsules (0.8 mg total) by mouth every evening. DO NOT CRUSH OR CHEW; SWALLOW WHOLE., Disp: 180 capsule, Rfl: 3    triamcinolone acetonide 0.1% (KENALOG) 0.1 %  "cream, Apply topically 2 (two) times daily., Disp: 80 g, Rfl: 0    Review of Systems   Constitutional:  Negative for chills, fatigue, fever and unexpected weight change.   HENT:  Negative for congestion, ear pain, hearing loss, sinus pain and sore throat.    Eyes:  Negative for pain and visual disturbance.   Respiratory:  Negative for cough, shortness of breath and wheezing.    Cardiovascular:  Negative for chest pain, palpitations and leg swelling.   Gastrointestinal:  Negative for abdominal pain, blood in stool, constipation, diarrhea, nausea and vomiting.   Endocrine: Negative for cold intolerance and heat intolerance.   Genitourinary:  Negative for difficulty urinating, dysuria, frequency, testicular pain and urgency.   Musculoskeletal:  Negative for back pain, joint swelling and neck pain.        HPI   Skin:  Negative for pallor and rash.   Neurological:  Negative for dizziness, tremors, weakness, numbness and headaches.   Hematological:  Does not bruise/bleed easily.   Psychiatric/Behavioral:  Negative for agitation, sleep disturbance and suicidal ideas.           Objective:          1/5/2023     8:37 AM 1/11/2023    10:04 AM 2/28/2023    10:52 AM 3/21/2023     8:24 AM 7/11/2023     8:31 AM 11/7/2023     1:17 PM   Vitals - 1 value per visit   SYSTOLIC 126 110 98 128 126 106   DIASTOLIC 82 70 58 76 66 66   Pulse 66 46 76 78 81 92   Temp   97.7 °F (36.5 °C) 97.5 °F (36.4 °C)  97.6 °F (36.4 °C)   Resp   16 14  14   SPO2 97 %  95 % 96 % 95 % 96 %   Weight (lb) 204.8 204 200 204 204.1 202   Weight (kg) 92.897 92.534 90.719 92.534 92.579 91.627   Height 5' 9" (1.753 m) 5' 9" (1.753 m) 5' 9" (1.753 m) 5' 9" (1.753 m) 5' 9" (1.753 m) 5' 9" (1.753 m)   BMI (Calculated) 30.2 30.1 29.5 30.1 30.1 29.8   Pain Score Zero Zero Zero Zero Zero Zero   (    Physical Exam  Constitutional:       Appearance: He is obese.   HENT:      Nose: Nose normal.      Mouth/Throat:      Mouth: Mucous membranes are moist.   Cardiovascular:     "  Rate and Rhythm: Regular rhythm.   Pulmonary:      Effort: Pulmonary effort is normal.      Breath sounds: Normal breath sounds.   Musculoskeletal:         General: No swelling, tenderness or deformity.   Neurological:      General: No focal deficit present.      Mental Status: He is alert and oriented to person, place, and time.           Assessment:       1. Right hip pain         Plan:       Right hip pain        -     pt was warned prednisone may elevate glucose-if pain continues, we will send to ortho      No follow-ups on file.        11/7/2023 Chata Christianson M.D.       37

## 2025-01-13 DIAGNOSIS — Z00.00 ENCOUNTER FOR MEDICARE ANNUAL WELLNESS EXAM: ICD-10-CM

## 2025-02-21 DIAGNOSIS — Z87.891 PERSONAL HISTORY OF NICOTINE DEPENDENCE: Primary | ICD-10-CM

## 2025-03-10 ENCOUNTER — PATIENT MESSAGE (OUTPATIENT)
Dept: ADMINISTRATIVE | Facility: HOSPITAL | Age: 80
End: 2025-03-10
Payer: MEDICARE

## 2025-05-05 ENCOUNTER — PATIENT MESSAGE (OUTPATIENT)
Dept: UROLOGY | Facility: CLINIC | Age: 80
End: 2025-05-05
Payer: MEDICARE

## 2025-05-24 DIAGNOSIS — E11.22 TYPE 2 DIABETES MELLITUS WITH STAGE 3B CHRONIC KIDNEY DISEASE, WITHOUT LONG-TERM CURRENT USE OF INSULIN: ICD-10-CM

## 2025-05-24 DIAGNOSIS — N18.32 TYPE 2 DIABETES MELLITUS WITH STAGE 3B CHRONIC KIDNEY DISEASE, WITHOUT LONG-TERM CURRENT USE OF INSULIN: ICD-10-CM

## 2025-05-27 RX ORDER — GLIPIZIDE 10 MG/1
TABLET ORAL
Qty: 270 TABLET | Refills: 0 | OUTPATIENT
Start: 2025-05-27

## 2025-08-07 ENCOUNTER — OFFICE VISIT (OUTPATIENT)
Dept: UROLOGY | Facility: CLINIC | Age: 80
End: 2025-08-07
Payer: MEDICARE

## 2025-08-07 DIAGNOSIS — R35.1 NOCTURIA: ICD-10-CM

## 2025-08-07 DIAGNOSIS — N35.911 STRICTURE OF URETHRAL MEATUS IN MALE, UNSPECIFIED STRICTURE TYPE: ICD-10-CM

## 2025-08-07 DIAGNOSIS — R39.9 LOWER URINARY TRACT SYMPTOMS (LUTS): Primary | ICD-10-CM

## 2025-08-07 DIAGNOSIS — N48.1 BALANITIS: ICD-10-CM

## 2025-08-07 LAB — POC RESIDUAL URINE VOLUME: 63 ML (ref 0–100)

## 2025-08-07 PROCEDURE — 51798 US URINE CAPACITY MEASURE: CPT | Mod: PBBFAC,PO | Performed by: UROLOGY

## 2025-08-07 PROCEDURE — 99999PBSHW POCT BLADDER SCAN: Mod: PBBFAC,,,

## 2025-08-07 PROCEDURE — 99999 PR PBB SHADOW E&M-EST. PATIENT-LVL III: CPT | Mod: PBBFAC,,, | Performed by: UROLOGY

## 2025-08-07 PROCEDURE — 99214 OFFICE O/P EST MOD 30 MIN: CPT | Mod: S$PBB,,, | Performed by: UROLOGY

## 2025-08-07 PROCEDURE — G2211 COMPLEX E/M VISIT ADD ON: HCPCS | Mod: ,,, | Performed by: UROLOGY

## 2025-08-07 PROCEDURE — 99213 OFFICE O/P EST LOW 20 MIN: CPT | Mod: PBBFAC,PO,25 | Performed by: UROLOGY

## 2025-08-07 RX ORDER — TAMSULOSIN HYDROCHLORIDE 0.4 MG/1
0.8 CAPSULE ORAL NIGHTLY
Qty: 180 CAPSULE | Refills: 3 | Status: SHIPPED | OUTPATIENT
Start: 2025-08-07 | End: 2026-08-07

## 2025-08-07 RX ORDER — FINASTERIDE 5 MG/1
5 TABLET, FILM COATED ORAL NIGHTLY
Qty: 90 TABLET | Refills: 3 | Status: SHIPPED | OUTPATIENT
Start: 2025-08-07 | End: 2026-08-07

## 2025-08-07 NOTE — PROGRESS NOTES
[facility] Established Patient/H&P:    Theo Chen Jr. is a 80 y.o. male who presents for follow up for lower urinary tract symptoms.     Patient complains of nocturia x 4, frequency and incomplete emptying for approximately 1 year refractory to Flomax 0.8 MG PO daily and Finasteride 5 mg.      He is extremely bothered by his symptoms and does not notice any relief with medications.      Of note, he has required a pacehco catheter in the past for urinary retention.         Interval History     11/2/22: He underwent cystoscopy and TRUS on 6/10/20 which revealed a 30 cc prostate with coaptation of bilateral lobes, large bladder volume. Plan was for possible Rezum, however patient was lost to follow up due to the COVID pandemic.      Presents now complaining of severe lower urinary tract symptoms - incomplete emptying, frequency, urgency, intermittency, weak stream and nocturia x 4. Drinks only water and tea. He remains on Flomax 0.8 mg and Finasteride 5 mg PO daily. Bothered by his symptoms. Urine dipstick only with glucose today.      1/11/23: He underwent cystoscopy, meatal dilation and transrectal ultrasound on 11/16/22 which revealed meatal stenosis requiring dilation with a urethral dilator. Prostate 52 cc with trilobar hypertrophy, moderate trabeculations and scattered diverticula. It was felt that his symptoms were likely secondary to his balanitis complicated by meatal stenosis. Prescribed Lotrisone and given a meatal dilator to use daily. States his lower urinary tract symptoms have improved significantly since his visit. No longer with weak stream or nocturia. PVR improved to 133 mL from 378 mL prior.      2/9/24: Here today for follow up. States he continues to calibrate his meatus at least once daily. Reports mild to moderate lower urinary tract symptoms that remain stable - weak stream, nocturia x 3 and intermittency. Of note, patient recently had a fall that caused significant  damage to his back.     8/7/25: Here today for follow up. States he continues to calibrate his meatus at least once daily. Reports mild to moderate lower urinary tract symptoms that remain stable - nocturia x 2, mild weak stream and intermittency. Not bothered. Remains on Flomax and Finasteride.      Patient denies any fever, chills, dysuria, urinary tract infection, recent  trauma or history of  malignancy.         PSA  0.8  5/7/25  0.32                 6/30/21  0.7                   5/7/19     IPSS    QoL  7 2 8/7/25  30        5          11/2/22     PVR  63 mL  8/7/25  130 mL 2/9/24  133 mL           1/11/23  378 mL          11/2/22  360 mL            6/16/20     Urine culture  No growth        11/2/22    Past Medical History:   Diagnosis Date    COPD (chronic obstructive pulmonary disease)     Diabetes mellitus, type 2     Gastroenteritis     Seasonal allergic rhinitis due to pollen     Shingles        Past Surgical History:   Procedure Laterality Date    ABDOMINAL AORTOGRAPHY N/A 9/9/2020    Procedure: Aortogram, Abdominal;  Surgeon: Carlitos Jaime MD;  Location: [facility] CATH/EP LAB;  Service: Cardiology;  Laterality: N/A;    APPENDECTOMY      CATHETERIZATION OF BOTH LEFT AND RIGHT HEART N/A 9/9/2020    Procedure: CATHETERIZATION, HEART, BOTH LEFT AND RIGHT;  Surgeon: Carlitos Jaime MD;  Location: [facility] CATH/EP LAB;  Service: Cardiology;  Laterality: N/A;    CYSTOSCOPY N/A 6/10/2020    Procedure: CYSTOSCOPY;  Surgeon: Hunter Guerrero Jr., MD;  Location: [facility] OR;  Service: Urology;  Laterality: N/A;    CYSTOSCOPY N/A 11/16/2022    Procedure: CYSTOSCOPY;  Surgeon: Hunter Guerrero Jr., MD;  Location: [facility] OR;  Service: Urology;  Laterality: N/A;    EYE SURGERY Left     TRANSRECTAL ULTRASOUND EXAMINATION N/A 6/10/2020    Procedure: ULTRASOUND, RECTAL APPROACH;  Surgeon: Hunter Guerrero Jr., MD;  Location: [facility] OR;  Service: Urology;  Laterality: N/A;    TRANSRECTAL ULTRASOUND EXAMINATION N/A 11/16/2022     Procedure: ULTRASOUND, RECTAL APPROACH;  Surgeon: Hunter Guerrero Jr., MD;  Location: [facility] OR;  Service: Urology;  Laterality: N/A;       Review of patient's allergies indicates:  No Known Allergies    Medications Reviewed: see MAR    FOCUSED PHYSICAL EXAM:    There were no vitals filed for this visit.  There is no height or weight on file to calculate BMI.           General: Alert, cooperative, no distress, appears stated age  Abdomen: Soft, non-tender, no CVA tenderness, non-distended        LABS:    Recent Results (from the past 2 weeks)   POCT Bladder Scan    Collection Time: 08/07/25 10:12 AM   Result Value Ref Range    POC Residual Urine Volume 63 0 - 100 mL           Assessment/Diagnosis:    1. Lower urinary tract symptoms (LUTS)  POCT Bladder Scan      2. Stricture of urethral meatus in male, unspecified stricture type        3. Balanitis            Plans:    - I spent 30 minutes of the day of this encounter preparing for, treating and managing this patient. Visit today included increased complexity associated with the care of the episodic problem addressed and managing the longitudinal care of the patient due to the serious and/or complex managed problem(s) lower urinary tract symptoms, urethral stricture and balanitis. xtensive discussion with patient regarding the etiology and management of his lower urinary tract symptoms. Explained that LUTS are multifactorial and can be secondary to an enlarged prostate, PO intake of bladder irritants, overactive bladder, constipation, malignancy, trauma, infection, stones or medications.   - LUTS and PVR have improved significantly since his cystoscopy with meatal dilation. Patient has been using a meatal dilator several times weekly to keep his meatus patent.   - Continue Flomax 0.8 mg and Finasteride 5 mg PO daily.   - Continue to use dilator at least once weekly.   - RTC in 1 year with symptom score and PVR.

## (undated) DEVICE — GUIDE BIOPSY BIPLANAR 18G

## (undated) DEVICE — LUBRICANT SURGILUBE 2 OZ

## (undated) DEVICE — SCRUB 10% POVIDONE IODINE 4OZ

## (undated) DEVICE — SEE MEDLINE ITEM 152651

## (undated) DEVICE — CONTAINER SPECIMEN OR STER 4OZ

## (undated) DEVICE — GOWN X-LG STERILE BACK

## (undated) DEVICE — CATHETER EXPO MLTPK 6FR 100X110CM

## (undated) DEVICE — DRAPE MINOR FEN 98X77X121IN

## (undated) DEVICE — JELLY KY LUBRICATING 5G PACKET

## (undated) DEVICE — DRAPE MEDIUM SHEET 40X70IN

## (undated) DEVICE — DRAPE MINOR PROCEDURE

## (undated) DEVICE — GUIDEWIRE DOUBLE ENDED .035 DIA. 150CML

## (undated) DEVICE — SHEATH PINNACLE 6FRX10CM W/GUIDEWIRE

## (undated) DEVICE — SET CYSTO IRRIGATION UNIV SPIK

## (undated) DEVICE — Device

## (undated) DEVICE — GLOVE SURGEONS ULTRA TOUCH 6.5

## (undated) DEVICE — GUIDEWIRE J TIP EXCHANGE FIXED CORE 260

## (undated) DEVICE — SOL STERILE WATER INJ 1000ML

## (undated) DEVICE — SPONGE GAUZE 16PLY 4X4

## (undated) DEVICE — COVER TRANSDUCER LATEX N/STERI

## (undated) DEVICE — WATER STERILE INJ 500ML BAG

## (undated) DEVICE — SHEET DRAPE MEDIUM

## (undated) DEVICE — SYR LUER LOCK STERILE 10ML

## (undated) DEVICE — GLOVE SURG ULTRA TOUCH 7.5

## (undated) DEVICE — SYR 30CC LUER LOCK